# Patient Record
Sex: MALE | Race: WHITE | NOT HISPANIC OR LATINO | URBAN - METROPOLITAN AREA
[De-identification: names, ages, dates, MRNs, and addresses within clinical notes are randomized per-mention and may not be internally consistent; named-entity substitution may affect disease eponyms.]

---

## 2017-05-09 ENCOUNTER — EMERGENCY (EMERGENCY)
Facility: HOSPITAL | Age: 22
LOS: 0 days | Discharge: HOME | End: 2017-05-09
Admitting: INTERNAL MEDICINE
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
## 2017-06-28 DIAGNOSIS — S10.93XA CONTUSION OF UNSPECIFIED PART OF NECK, INITIAL ENCOUNTER: ICD-10-CM

## 2017-06-28 DIAGNOSIS — T74.11XA ADULT PHYSICAL ABUSE, CONFIRMED, INITIAL ENCOUNTER: ICD-10-CM

## 2017-06-28 DIAGNOSIS — E11.9 TYPE 2 DIABETES MELLITUS WITHOUT COMPLICATIONS: ICD-10-CM

## 2017-06-28 DIAGNOSIS — Z91.018 ALLERGY TO OTHER FOODS: ICD-10-CM
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
## 2017-06-28 DIAGNOSIS — Z91.040 LATEX ALLERGY STATUS: ICD-10-CM

## 2017-06-28 DIAGNOSIS — Z79.899 OTHER LONG TERM (CURRENT) DRUG THERAPY: ICD-10-CM

## 2018-03-28 ENCOUNTER — OUTPATIENT (OUTPATIENT)
Dept: OUTPATIENT SERVICES | Facility: HOSPITAL | Age: 23
LOS: 1 days | Discharge: HOME | End: 2018-03-28

## 2018-03-28 ENCOUNTER — APPOINTMENT (OUTPATIENT)
Dept: ORTHOPEDIC SURGERY | Facility: CLINIC | Age: 23
End: 2018-03-28

## 2018-03-28 DIAGNOSIS — M25.512 PAIN IN LEFT SHOULDER: ICD-10-CM

## 2018-03-28 DIAGNOSIS — M25.561 PAIN IN RIGHT KNEE: ICD-10-CM

## 2022-02-26 ENCOUNTER — TRANSCRIPTION ENCOUNTER (OUTPATIENT)
Age: 27
End: 2022-02-26

## 2022-05-15 ENCOUNTER — NON-APPOINTMENT (OUTPATIENT)
Age: 27
End: 2022-05-15

## 2022-05-16 ENCOUNTER — INPATIENT (INPATIENT)
Facility: HOSPITAL | Age: 27
LOS: 1 days | Discharge: HOME | End: 2022-05-18
Attending: INTERNAL MEDICINE | Admitting: INTERNAL MEDICINE
Payer: MEDICAID

## 2022-05-16 VITALS
TEMPERATURE: 98 F | WEIGHT: 139.99 LBS | DIASTOLIC BLOOD PRESSURE: 60 MMHG | RESPIRATION RATE: 22 BRPM | SYSTOLIC BLOOD PRESSURE: 109 MMHG | OXYGEN SATURATION: 98 % | HEART RATE: 121 BPM

## 2022-05-16 LAB
ALBUMIN SERPL ELPH-MCNC: 4.1 G/DL — SIGNIFICANT CHANGE UP (ref 3.5–5.2)
ALP SERPL-CCNC: 121 U/L — HIGH (ref 30–115)
ALT FLD-CCNC: 31 U/L — SIGNIFICANT CHANGE UP (ref 0–41)
ANION GAP SERPL CALC-SCNC: 16 MMOL/L — HIGH (ref 7–14)
ANION GAP SERPL CALC-SCNC: 22 MMOL/L — HIGH (ref 7–14)
APPEARANCE UR: CLEAR — SIGNIFICANT CHANGE UP
AST SERPL-CCNC: 31 U/L — SIGNIFICANT CHANGE UP (ref 0–41)
B-OH-BUTYR SERPL-SCNC: 4.8 MMOL/L — HIGH
BASE EXCESS BLDV CALC-SCNC: -5.1 MMOL/L — LOW (ref -2–3)
BASOPHILS # BLD AUTO: 0.02 K/UL — SIGNIFICANT CHANGE UP (ref 0–0.2)
BASOPHILS NFR BLD AUTO: 0.6 % — SIGNIFICANT CHANGE UP (ref 0–1)
BILIRUB SERPL-MCNC: 0.7 MG/DL — SIGNIFICANT CHANGE UP (ref 0.2–1.2)
BILIRUB UR-MCNC: NEGATIVE — SIGNIFICANT CHANGE UP
BUN SERPL-MCNC: 11 MG/DL — SIGNIFICANT CHANGE UP (ref 10–20)
BUN SERPL-MCNC: 15 MG/DL — SIGNIFICANT CHANGE UP (ref 10–20)
CA-I SERPL-SCNC: 1.17 MMOL/L — SIGNIFICANT CHANGE UP (ref 1.15–1.33)
CALCIUM SERPL-MCNC: 8.6 MG/DL — SIGNIFICANT CHANGE UP (ref 8.5–10.1)
CALCIUM SERPL-MCNC: 9.2 MG/DL — SIGNIFICANT CHANGE UP (ref 8.5–10.1)
CHLORIDE SERPL-SCNC: 91 MMOL/L — LOW (ref 98–110)
CHLORIDE SERPL-SCNC: 99 MMOL/L — SIGNIFICANT CHANGE UP (ref 98–110)
CO2 SERPL-SCNC: 18 MMOL/L — SIGNIFICANT CHANGE UP (ref 17–32)
CO2 SERPL-SCNC: 20 MMOL/L — SIGNIFICANT CHANGE UP (ref 17–32)
COLOR SPEC: COLORLESS — SIGNIFICANT CHANGE UP
CREAT SERPL-MCNC: 0.8 MG/DL — SIGNIFICANT CHANGE UP (ref 0.7–1.5)
CREAT SERPL-MCNC: 1 MG/DL — SIGNIFICANT CHANGE UP (ref 0.7–1.5)
DIFF PNL FLD: NEGATIVE — SIGNIFICANT CHANGE UP
EGFR: 106 ML/MIN/1.73M2 — SIGNIFICANT CHANGE UP
EGFR: 125 ML/MIN/1.73M2 — SIGNIFICANT CHANGE UP
EOSINOPHIL # BLD AUTO: 0.01 K/UL — SIGNIFICANT CHANGE UP (ref 0–0.7)
EOSINOPHIL NFR BLD AUTO: 0.3 % — SIGNIFICANT CHANGE UP (ref 0–8)
GAS PNL BLDV: 123 MMOL/L — LOW (ref 136–145)
GAS PNL BLDV: SIGNIFICANT CHANGE UP
GLUCOSE BLDC GLUCOMTR-MCNC: 181 MG/DL — HIGH (ref 70–99)
GLUCOSE BLDC GLUCOMTR-MCNC: 182 MG/DL — HIGH (ref 70–99)
GLUCOSE BLDC GLUCOMTR-MCNC: 188 MG/DL — HIGH (ref 70–99)
GLUCOSE BLDC GLUCOMTR-MCNC: 322 MG/DL — HIGH (ref 70–99)
GLUCOSE BLDC GLUCOMTR-MCNC: 597 MG/DL — CRITICAL HIGH (ref 70–99)
GLUCOSE SERPL-MCNC: 216 MG/DL — HIGH (ref 70–99)
GLUCOSE SERPL-MCNC: 661 MG/DL — CRITICAL HIGH (ref 70–99)
GLUCOSE UR QL: ABNORMAL
HCO3 BLDV-SCNC: 22 MMOL/L — SIGNIFICANT CHANGE UP (ref 22–29)
HCT VFR BLD CALC: 46.3 % — SIGNIFICANT CHANGE UP (ref 42–52)
HCT VFR BLDA CALC: 48 % — SIGNIFICANT CHANGE UP (ref 39–51)
HGB BLD CALC-MCNC: 16 G/DL — SIGNIFICANT CHANGE UP (ref 12.6–17.4)
HGB BLD-MCNC: 15.9 G/DL — SIGNIFICANT CHANGE UP (ref 14–18)
IMM GRANULOCYTES NFR BLD AUTO: 0.3 % — SIGNIFICANT CHANGE UP (ref 0.1–0.3)
KETONES UR-MCNC: ABNORMAL
LACTATE BLDV-MCNC: 1.7 MMOL/L — SIGNIFICANT CHANGE UP (ref 0.5–2)
LACTATE SERPL-SCNC: 1.9 MMOL/L — SIGNIFICANT CHANGE UP (ref 0.7–2)
LEUKOCYTE ESTERASE UR-ACNC: NEGATIVE — SIGNIFICANT CHANGE UP
LYMPHOCYTES # BLD AUTO: 0.58 K/UL — LOW (ref 1.2–3.4)
LYMPHOCYTES # BLD AUTO: 16.2 % — LOW (ref 20.5–51.1)
MAGNESIUM SERPL-MCNC: 1.8 MG/DL — SIGNIFICANT CHANGE UP (ref 1.8–2.4)
MCHC RBC-ENTMCNC: 29.9 PG — SIGNIFICANT CHANGE UP (ref 27–31)
MCHC RBC-ENTMCNC: 34.3 G/DL — SIGNIFICANT CHANGE UP (ref 32–37)
MCV RBC AUTO: 87 FL — SIGNIFICANT CHANGE UP (ref 80–94)
MONOCYTES # BLD AUTO: 0.39 K/UL — SIGNIFICANT CHANGE UP (ref 0.1–0.6)
MONOCYTES NFR BLD AUTO: 10.9 % — HIGH (ref 1.7–9.3)
NEUTROPHILS # BLD AUTO: 2.58 K/UL — SIGNIFICANT CHANGE UP (ref 1.4–6.5)
NEUTROPHILS NFR BLD AUTO: 71.7 % — SIGNIFICANT CHANGE UP (ref 42.2–75.2)
NITRITE UR-MCNC: NEGATIVE — SIGNIFICANT CHANGE UP
NRBC # BLD: 0 /100 WBCS — SIGNIFICANT CHANGE UP (ref 0–0)
OSMOLALITY SERPL: 312 MOS/KG — HIGH (ref 275–300)
PCO2 BLDV: 45 MMHG — SIGNIFICANT CHANGE UP (ref 42–55)
PH BLDV: 7.29 — LOW (ref 7.32–7.43)
PH UR: 6 — SIGNIFICANT CHANGE UP (ref 5–8)
PHOSPHATE SERPL-MCNC: 3.7 MG/DL — SIGNIFICANT CHANGE UP (ref 2.1–4.9)
PLATELET # BLD AUTO: 215 K/UL — SIGNIFICANT CHANGE UP (ref 130–400)
PO2 BLDV: 24 MMHG — SIGNIFICANT CHANGE UP
POTASSIUM BLDV-SCNC: 5.6 MMOL/L — HIGH (ref 3.5–5.1)
POTASSIUM SERPL-MCNC: 4.2 MMOL/L — SIGNIFICANT CHANGE UP (ref 3.5–5)
POTASSIUM SERPL-MCNC: 5.8 MMOL/L — HIGH (ref 3.5–5)
POTASSIUM SERPL-SCNC: 4.2 MMOL/L — SIGNIFICANT CHANGE UP (ref 3.5–5)
POTASSIUM SERPL-SCNC: 5.8 MMOL/L — HIGH (ref 3.5–5)
PROT SERPL-MCNC: 6.4 G/DL — SIGNIFICANT CHANGE UP (ref 6–8)
PROT UR-MCNC: NEGATIVE — SIGNIFICANT CHANGE UP
RBC # BLD: 5.32 M/UL — SIGNIFICANT CHANGE UP (ref 4.7–6.1)
RBC # FLD: 11.5 % — SIGNIFICANT CHANGE UP (ref 11.5–14.5)
SAO2 % BLDV: 43 % — SIGNIFICANT CHANGE UP
SODIUM SERPL-SCNC: 131 MMOL/L — LOW (ref 135–146)
SODIUM SERPL-SCNC: 135 MMOL/L — SIGNIFICANT CHANGE UP (ref 135–146)
SP GR SPEC: 1.03 — SIGNIFICANT CHANGE UP (ref 1.01–1.03)
UROBILINOGEN FLD QL: SIGNIFICANT CHANGE UP
WBC # BLD: 3.59 K/UL — LOW (ref 4.8–10.8)
WBC # FLD AUTO: 3.59 K/UL — LOW (ref 4.8–10.8)

## 2022-05-16 PROCEDURE — 99291 CRITICAL CARE FIRST HOUR: CPT

## 2022-05-16 PROCEDURE — 93010 ELECTROCARDIOGRAM REPORT: CPT

## 2022-05-16 RX ORDER — SODIUM CHLORIDE 9 MG/ML
1000 INJECTION, SOLUTION INTRAVENOUS ONCE
Refills: 0 | Status: COMPLETED | OUTPATIENT
Start: 2022-05-16 | End: 2022-05-16

## 2022-05-16 RX ORDER — ONDANSETRON 8 MG/1
4 TABLET, FILM COATED ORAL ONCE
Refills: 0 | Status: COMPLETED | OUTPATIENT
Start: 2022-05-16 | End: 2022-05-16

## 2022-05-16 RX ORDER — PANTOPRAZOLE SODIUM 20 MG/1
40 TABLET, DELAYED RELEASE ORAL
Refills: 0 | Status: DISCONTINUED | OUTPATIENT
Start: 2022-05-16 | End: 2022-05-18

## 2022-05-16 RX ORDER — SODIUM CHLORIDE 9 MG/ML
1000 INJECTION, SOLUTION INTRAVENOUS
Refills: 0 | Status: DISCONTINUED | OUTPATIENT
Start: 2022-05-16 | End: 2022-05-16

## 2022-05-16 RX ORDER — CHLORHEXIDINE GLUCONATE 213 G/1000ML
1 SOLUTION TOPICAL DAILY
Refills: 0 | Status: DISCONTINUED | OUTPATIENT
Start: 2022-05-16 | End: 2022-05-18

## 2022-05-16 RX ORDER — INSULIN HUMAN 100 [IU]/ML
4 INJECTION, SOLUTION SUBCUTANEOUS
Qty: 100 | Refills: 0 | Status: DISCONTINUED | OUTPATIENT
Start: 2022-05-16 | End: 2022-05-17

## 2022-05-16 RX ORDER — INSULIN HUMAN 100 [IU]/ML
10 INJECTION, SOLUTION SUBCUTANEOUS ONCE
Refills: 0 | Status: COMPLETED | OUTPATIENT
Start: 2022-05-16 | End: 2022-05-16

## 2022-05-16 RX ORDER — SODIUM CHLORIDE 9 MG/ML
2000 INJECTION, SOLUTION INTRAVENOUS ONCE
Refills: 0 | Status: COMPLETED | OUTPATIENT
Start: 2022-05-16 | End: 2022-05-16

## 2022-05-16 RX ORDER — SODIUM CHLORIDE 9 MG/ML
1000 INJECTION, SOLUTION INTRAVENOUS
Refills: 0 | Status: DISCONTINUED | OUTPATIENT
Start: 2022-05-16 | End: 2022-05-17

## 2022-05-16 RX ORDER — ENOXAPARIN SODIUM 100 MG/ML
40 INJECTION SUBCUTANEOUS EVERY 24 HOURS
Refills: 0 | Status: DISCONTINUED | OUTPATIENT
Start: 2022-05-16 | End: 2022-05-18

## 2022-05-16 RX ADMIN — SODIUM CHLORIDE 150 MILLILITER(S): 9 INJECTION, SOLUTION INTRAVENOUS at 21:04

## 2022-05-16 RX ADMIN — INSULIN HUMAN 8 UNIT(S)/HR: 100 INJECTION, SOLUTION SUBCUTANEOUS at 17:38

## 2022-05-16 RX ADMIN — SODIUM CHLORIDE 150 MILLILITER(S): 9 INJECTION, SOLUTION INTRAVENOUS at 20:07

## 2022-05-16 RX ADMIN — INSULIN HUMAN 10 UNIT(S): 100 INJECTION, SOLUTION SUBCUTANEOUS at 17:38

## 2022-05-16 RX ADMIN — SODIUM CHLORIDE 1000 MILLILITER(S): 9 INJECTION, SOLUTION INTRAVENOUS at 18:05

## 2022-05-16 RX ADMIN — SODIUM CHLORIDE 2000 MILLILITER(S): 9 INJECTION, SOLUTION INTRAVENOUS at 16:27

## 2022-05-16 RX ADMIN — ENOXAPARIN SODIUM 40 MILLIGRAM(S): 100 INJECTION SUBCUTANEOUS at 20:06

## 2022-05-16 RX ADMIN — ONDANSETRON 4 MILLIGRAM(S): 8 TABLET, FILM COATED ORAL at 18:05

## 2022-05-16 RX ADMIN — INSULIN HUMAN 4 UNIT(S)/HR: 100 INJECTION, SOLUTION SUBCUTANEOUS at 21:04

## 2022-05-16 RX ADMIN — SODIUM CHLORIDE 1000 MILLILITER(S): 9 INJECTION, SOLUTION INTRAVENOUS at 15:55

## 2022-05-16 NOTE — H&P ADULT - NSHPPHYSICALEXAM_GEN_ALL_CORE
General: NAD  Lungs: GBAE, no adventitious sounds  Heart: Tachycardic, normal s1s2, no audible murmurs  Abdomen: +BS, soft, nontender  LE: No DUANE, no ulcers or signs of Diabetic foot.

## 2022-05-16 NOTE — ED PROVIDER NOTE - ATTENDING CONTRIBUTION TO CARE
26 y.o. male, PMH of DMI on insulin, comes in c/o n/v/d and fever since friday associated with high FSs which are hard to control. No CP/SOB/abdominal pain. No urinary symptoms. No rash. On exam, pt in NAD, AAOx3, head NC/AT, CN II-XII intact, MM dry, lungs CTA B/L, CV S1S2 regular, abdomen soft/NT/ND/(+)BS, ext (-) edema, motor 5/5x4, sensation intact. Will do labs, IVF, and reevaluate.

## 2022-05-16 NOTE — H&P ADULT - ATTENDING COMMENTS
Events noted, fever, DKA, on insulin, IVF, CXR, RVP, sq insulin if tolerates PO, Endo Eval, amylase, SDU when off insulin drip

## 2022-05-16 NOTE — ED ADULT NURSE REASSESSMENT NOTE - NS ED NURSE REASSESS COMMENT FT1
Pt received from previous RN Pt in bed comfrotably OAx4 speaking in full sentences. Pt on cardiac monitor . Pt waiting for bed ICU. NAD noted at this time. Pt IN insulin drip. Call bell in reach,. Pt aware of plan.

## 2022-05-16 NOTE — ED ADULT TRIAGE NOTE - CHIEF COMPLAINT QUOTE
[place] [facility] [place] patient was sick last week with stomach virus has had elevated f/s for last 3 days

## 2022-05-16 NOTE — H&P ADULT - NSHPLABSRESULTS_GEN_ALL_CORE
WBC Count: 3.59 K/uL   RBC Count: 5.32 M/uL   Hemoglobin: 15.9 g/dL   Hematocrit: 46.3 %   Mean Cell Volume: 87.0 fL   Mean Cell Hemoglobin: 29.9 pg   Mean Cell Hemoglobin Conc: 34.3 g/dL   Red Cell Distrib Width: 11.5 %   Platelet Count - Automated: 215 K/uL   Auto Neutrophil #: 2.58 K/uL   Auto Lymphocyte #: 0.58 K/uL   Auto Monocyte #: 0.39 K/uL   Auto Eosinophil #: 0.01 K/uL   Auto Basophil #: 0.02 K/uL   Auto Neutrophil %: 71.7: Differential percentages must be correlated with absolute numbers for   clinical significance. %   Auto Lymphocyte %: 16.2 %   Auto Monocyte %: 10.9 %   Auto Eosinophil %: 0.3 %   Auto Basophil %: 0.6 %   Auto Immature Granulocyte %: 0.3: (Includes meta, myelo and promyelocytes) %   Nucleated RBC: 0 /100 WBCs     Sodium, Serum: 131 mmol/L   Potassium, Serum: 5.8 mmol/L   Chloride, Serum: 91 mmol/L   Carbon Dioxide, Serum: 18 mmol/L   Anion Gap, Serum: 22 mmol/L   Blood Urea Nitrogen, Serum: 15 mg/dL   Creatinine, Serum: 1.0 mg/dL   Glucose, Serum: 661: Critical value: CALLED TO/READ BACK BY DR HOLLIS @ 4:50PM 5/16/22 mg/dL   Calcium, Total Serum: 9.2 mg/dL   Protein Total, Serum: 6.4 g/dL   Albumin, Serum: 4.1 g/dL   Bilirubin Total, Serum: 0.7 mg/dL   Alkaline Phosphatase, Serum: 121 U/L   Aspartate Aminotransferase (AST/SGOT): 31 U/L   Alanine Aminotransferase (ALT/SGPT): 31 U/L     Beta Hydroxy-Butyrate: 4.8 mmoL/L

## 2022-05-16 NOTE — ED ADULT NURSE NOTE - NSICDXPASTMEDICALHX_GEN_ALL_CORE_FT
PAST MEDICAL HISTORY:  Hypercholesterolemia currently on Lipitor    Type 1 diabetes mellitus Diagnosed in 2005, managed by Dr. Johnston

## 2022-05-16 NOTE — ED ADULT NURSE NOTE - CHIEF COMPLAINT QUOTE
patient sent in from [facility] [place] for [place] patient was sick last week with stomach virus has had elevated f/s for last 3 days

## 2022-05-16 NOTE — ED PROVIDER NOTE - OBJECTIVE STATEMENT
Pt is a 25 y/o male with PMH of T1DM on insulin presenting with nausea, vomiting, diarrhea and fever x few days. Pt was checking his fingersticks at home and they read "high." Went to urgent care and was found to have ketones in his urine

## 2022-05-16 NOTE — H&P ADULT - HISTORY OF PRESENT ILLNESS
26-year-old, M patient with PMHx of Type I DM, presented to the ED for nausea vomiting and diarrhea. History goes back to 3 days prior to presentation when the patient started having nausea, vomiting and diarrhea. NBNB. The patient also reports decreased po intake. He reports associated increase in his sugars. Reports that he is compliant with the insulin and does not skip it. He also reports that his diet has not significantly changed ( did not significantly increase sugar/carb intake). He reports a fever 3 days prior to presentation ( 38.1C) highest. Alleviated by Tylenol. No specific pattern. Endorses greenish phlegm production, no other upper resp tract signs or symptoms. Endorses diarrhea. He denies any urinary symptoms apart from frequency, likely secondary to glucosuria.

## 2022-05-16 NOTE — H&P ADULT - ASSESSMENT
Impression:     #DKA  #HO of Type I DM    PLAN:    CNS: Avoid Sedatives    HEENT: HOB at 45 degrees.     PULMONARY: HOB, aspiration precautions.     CARDIOVASCULAR: IV fluids 0.45% NS at rate 250mL/hour ( corrected sodium >135)    GI: GI prophylaxis,  Feeding  ( patient reports that he can tolerate light meals)    RENAL: I=0, monitor electrolytes and replete as needed. BMP every shift.     INFECTIOUS DISEASE: Fevers, no WBC count. GI PCR, follow up RVP results (possible Flu vs other upper respiratory tract infection). UA is negative.     HEMATOLOGICAL:  DVT prophylaxis.     ENDOCRINE:  FS q1hrs while on insulin gtt. Insulin gtt.        Impression:     #DKA  #HO of Type I DM    PLAN:    CNS: Avoid Sedatives    HEENT: HOB at 45 degrees.     PULMONARY: HOB, aspiration precautions.     CARDIOVASCULAR: IV fluids 0.45% NS at rate 250mL/hour ( corrected sodium >135)    GI: GI prophylaxis,  Feeding  ( patient reports that he can tolerate light meals)    RENAL: I=0, monitor electrolytes and replete as needed. BMP every shift.     INFECTIOUS DISEASE: Fevers, no WBC count. GI PCR, follow up RVP results (possible Flu vs other upper respiratory tract infection). UA is negative.     HEMATOLOGICAL:  DVT prophylaxis.     ENDOCRINE:  FS q1hrs while on insulin gtt. Insulin gtt.     MICU

## 2022-05-16 NOTE — ED PROVIDER NOTE - NS ED ROS FT
Review of Systems:  CONSTITUTIONAL: No fever, No diaphoresis, No weight change  SKIN: No rash  HEMATOLOGIC: No abnormal bleeding or bruising  EYES: No eye pain, No blurred vision  ENT: No change in hearing, No sore throat, No neck pain, No rhinorrhea, No ear pain  RESPIRATORY: No shortness of breath, No cough  CARDIAC: No chest pain, No palpitations  GI: + abdominal pain, + nausea, + vomiting, + diarrhea, No constipation, No bright red blood per rectum or melena. No flank pain  : No dysuria, + frequency, no hematuria.   ENDO: No polydypsia, No polyuria, No heat/cold intolerance  MUSCULOSKELETAL: No joint paint, No swelling, No back pain  NEUROLOGIC: No numbness, No focal weakness, No headache, No dizziness  All other systems negative, unless specified in HPI

## 2022-05-17 LAB
A1C WITH ESTIMATED AVERAGE GLUCOSE RESULT: 9.1 % — HIGH (ref 4–5.6)
ANION GAP SERPL CALC-SCNC: 12 MMOL/L — SIGNIFICANT CHANGE UP (ref 7–14)
ANION GAP SERPL CALC-SCNC: 12 MMOL/L — SIGNIFICANT CHANGE UP (ref 7–14)
ANION GAP SERPL CALC-SCNC: 13 MMOL/L — SIGNIFICANT CHANGE UP (ref 7–14)
ANISOCYTOSIS BLD QL: SLIGHT — SIGNIFICANT CHANGE UP
BASOPHILS # BLD AUTO: 0 K/UL — SIGNIFICANT CHANGE UP (ref 0–0.2)
BASOPHILS NFR BLD AUTO: 0 % — SIGNIFICANT CHANGE UP (ref 0–1)
BUN SERPL-MCNC: 10 MG/DL — SIGNIFICANT CHANGE UP (ref 10–20)
BUN SERPL-MCNC: 7 MG/DL — LOW (ref 10–20)
BUN SERPL-MCNC: 7 MG/DL — LOW (ref 10–20)
CALCIUM SERPL-MCNC: 8.2 MG/DL — LOW (ref 8.5–10.1)
CALCIUM SERPL-MCNC: 8.3 MG/DL — LOW (ref 8.5–10.1)
CALCIUM SERPL-MCNC: 8.7 MG/DL — SIGNIFICANT CHANGE UP (ref 8.5–10.1)
CHLORIDE SERPL-SCNC: 104 MMOL/L — SIGNIFICANT CHANGE UP (ref 98–110)
CHLORIDE SERPL-SCNC: 104 MMOL/L — SIGNIFICANT CHANGE UP (ref 98–110)
CHLORIDE SERPL-SCNC: 98 MMOL/L — SIGNIFICANT CHANGE UP (ref 98–110)
CO2 SERPL-SCNC: 22 MMOL/L — SIGNIFICANT CHANGE UP (ref 17–32)
CO2 SERPL-SCNC: 23 MMOL/L — SIGNIFICANT CHANGE UP (ref 17–32)
CO2 SERPL-SCNC: 25 MMOL/L — SIGNIFICANT CHANGE UP (ref 17–32)
CREAT SERPL-MCNC: 0.7 MG/DL — SIGNIFICANT CHANGE UP (ref 0.7–1.5)
CREAT SERPL-MCNC: 0.7 MG/DL — SIGNIFICANT CHANGE UP (ref 0.7–1.5)
CREAT SERPL-MCNC: 0.8 MG/DL — SIGNIFICANT CHANGE UP (ref 0.7–1.5)
EGFR: 125 ML/MIN/1.73M2 — SIGNIFICANT CHANGE UP
EGFR: 130 ML/MIN/1.73M2 — SIGNIFICANT CHANGE UP
EGFR: 130 ML/MIN/1.73M2 — SIGNIFICANT CHANGE UP
EOSINOPHIL # BLD AUTO: 0.05 K/UL — SIGNIFICANT CHANGE UP (ref 0–0.7)
EOSINOPHIL NFR BLD AUTO: 1.7 % — SIGNIFICANT CHANGE UP (ref 0–8)
ESTIMATED AVERAGE GLUCOSE: 214 MG/DL — HIGH (ref 68–114)
GIANT PLATELETS BLD QL SMEAR: PRESENT — SIGNIFICANT CHANGE UP
GLUCOSE BLDC GLUCOMTR-MCNC: 118 MG/DL — HIGH (ref 70–99)
GLUCOSE BLDC GLUCOMTR-MCNC: 129 MG/DL — HIGH (ref 70–99)
GLUCOSE BLDC GLUCOMTR-MCNC: 138 MG/DL — HIGH (ref 70–99)
GLUCOSE BLDC GLUCOMTR-MCNC: 142 MG/DL — HIGH (ref 70–99)
GLUCOSE BLDC GLUCOMTR-MCNC: 143 MG/DL — HIGH (ref 70–99)
GLUCOSE BLDC GLUCOMTR-MCNC: 162 MG/DL — HIGH (ref 70–99)
GLUCOSE BLDC GLUCOMTR-MCNC: 173 MG/DL — HIGH (ref 70–99)
GLUCOSE BLDC GLUCOMTR-MCNC: 175 MG/DL — HIGH (ref 70–99)
GLUCOSE BLDC GLUCOMTR-MCNC: 195 MG/DL — HIGH (ref 70–99)
GLUCOSE BLDC GLUCOMTR-MCNC: 206 MG/DL — HIGH (ref 70–99)
GLUCOSE BLDC GLUCOMTR-MCNC: 209 MG/DL — HIGH (ref 70–99)
GLUCOSE BLDC GLUCOMTR-MCNC: 226 MG/DL — HIGH (ref 70–99)
GLUCOSE BLDC GLUCOMTR-MCNC: 240 MG/DL — HIGH (ref 70–99)
GLUCOSE BLDC GLUCOMTR-MCNC: 290 MG/DL — HIGH (ref 70–99)
GLUCOSE BLDC GLUCOMTR-MCNC: 290 MG/DL — HIGH (ref 70–99)
GLUCOSE SERPL-MCNC: 179 MG/DL — HIGH (ref 70–99)
GLUCOSE SERPL-MCNC: 190 MG/DL — HIGH (ref 70–99)
GLUCOSE SERPL-MCNC: 288 MG/DL — HIGH (ref 70–99)
HCT VFR BLD CALC: 37.5 % — LOW (ref 42–52)
HGB BLD-MCNC: 13.3 G/DL — LOW (ref 14–18)
LYMPHOCYTES # BLD AUTO: 1.56 K/UL — SIGNIFICANT CHANGE UP (ref 1.2–3.4)
LYMPHOCYTES # BLD AUTO: 55.6 % — HIGH (ref 20.5–51.1)
MACROCYTES BLD QL: SLIGHT — SIGNIFICANT CHANGE UP
MAGNESIUM SERPL-MCNC: 1.9 MG/DL — SIGNIFICANT CHANGE UP (ref 1.8–2.4)
MANUAL SMEAR VERIFICATION: SIGNIFICANT CHANGE UP
MCHC RBC-ENTMCNC: 30.2 PG — SIGNIFICANT CHANGE UP (ref 27–31)
MCHC RBC-ENTMCNC: 35.5 G/DL — SIGNIFICANT CHANGE UP (ref 32–37)
MCV RBC AUTO: 85.2 FL — SIGNIFICANT CHANGE UP (ref 80–94)
MICROCYTES BLD QL: SLIGHT — SIGNIFICANT CHANGE UP
MONOCYTES # BLD AUTO: 0.2 K/UL — SIGNIFICANT CHANGE UP (ref 0.1–0.6)
MONOCYTES NFR BLD AUTO: 7 % — SIGNIFICANT CHANGE UP (ref 1.7–9.3)
NEUTROPHILS # BLD AUTO: 1 K/UL — LOW (ref 1.4–6.5)
NEUTROPHILS NFR BLD AUTO: 32.2 % — LOW (ref 42.2–75.2)
NEUTS BAND # BLD: 3.5 % — SIGNIFICANT CHANGE UP (ref 0–6)
PLAT MORPH BLD: NORMAL — SIGNIFICANT CHANGE UP
PLATELET # BLD AUTO: 202 K/UL — SIGNIFICANT CHANGE UP (ref 130–400)
POIKILOCYTOSIS BLD QL AUTO: SLIGHT — SIGNIFICANT CHANGE UP
POTASSIUM SERPL-MCNC: 3.9 MMOL/L — SIGNIFICANT CHANGE UP (ref 3.5–5)
POTASSIUM SERPL-MCNC: 4 MMOL/L — SIGNIFICANT CHANGE UP (ref 3.5–5)
POTASSIUM SERPL-MCNC: 4.3 MMOL/L — SIGNIFICANT CHANGE UP (ref 3.5–5)
POTASSIUM SERPL-SCNC: 3.9 MMOL/L — SIGNIFICANT CHANGE UP (ref 3.5–5)
POTASSIUM SERPL-SCNC: 4 MMOL/L — SIGNIFICANT CHANGE UP (ref 3.5–5)
POTASSIUM SERPL-SCNC: 4.3 MMOL/L — SIGNIFICANT CHANGE UP (ref 3.5–5)
RAPID RVP RESULT: SIGNIFICANT CHANGE UP
RBC # BLD: 4.4 M/UL — LOW (ref 4.7–6.1)
RBC # FLD: 11.5 % — SIGNIFICANT CHANGE UP (ref 11.5–14.5)
RBC BLD AUTO: ABNORMAL
SARS-COV-2 RNA SPEC QL NAA+PROBE: SIGNIFICANT CHANGE UP
SARS-COV-2 RNA SPEC QL NAA+PROBE: SIGNIFICANT CHANGE UP
SMUDGE CELLS # BLD: PRESENT — SIGNIFICANT CHANGE UP
SODIUM SERPL-SCNC: 134 MMOL/L — LOW (ref 135–146)
SODIUM SERPL-SCNC: 138 MMOL/L — SIGNIFICANT CHANGE UP (ref 135–146)
SODIUM SERPL-SCNC: 141 MMOL/L — SIGNIFICANT CHANGE UP (ref 135–146)
STOMATOCYTES BLD QL SMEAR: SLIGHT — SIGNIFICANT CHANGE UP
WBC # BLD: 2.8 K/UL — LOW (ref 4.8–10.8)
WBC # FLD AUTO: 2.8 K/UL — LOW (ref 4.8–10.8)

## 2022-05-17 PROCEDURE — 71045 X-RAY EXAM CHEST 1 VIEW: CPT | Mod: 26

## 2022-05-17 PROCEDURE — 99222 1ST HOSP IP/OBS MODERATE 55: CPT

## 2022-05-17 RX ORDER — INSULIN GLARGINE 100 [IU]/ML
30 INJECTION, SOLUTION SUBCUTANEOUS AT BEDTIME
Refills: 0 | Status: DISCONTINUED | OUTPATIENT
Start: 2022-05-18 | End: 2022-05-18

## 2022-05-17 RX ORDER — INSULIN LISPRO 100/ML
5 VIAL (ML) SUBCUTANEOUS
Refills: 0 | Status: DISCONTINUED | OUTPATIENT
Start: 2022-05-17 | End: 2022-05-18

## 2022-05-17 RX ORDER — INSULIN LISPRO 100/ML
VIAL (ML) SUBCUTANEOUS
Refills: 0 | Status: DISCONTINUED | OUTPATIENT
Start: 2022-05-17 | End: 2022-05-18

## 2022-05-17 RX ORDER — ACETAMINOPHEN 500 MG
650 TABLET ORAL EVERY 6 HOURS
Refills: 0 | Status: DISCONTINUED | OUTPATIENT
Start: 2022-05-17 | End: 2022-05-18

## 2022-05-17 RX ORDER — INSULIN HUMAN 100 [IU]/ML
4 INJECTION, SOLUTION SUBCUTANEOUS
Qty: 100 | Refills: 0 | Status: DISCONTINUED | OUTPATIENT
Start: 2022-05-17 | End: 2022-05-17

## 2022-05-17 RX ORDER — INSULIN GLARGINE 100 [IU]/ML
17 INJECTION, SOLUTION SUBCUTANEOUS AT BEDTIME
Refills: 0 | Status: DISCONTINUED | OUTPATIENT
Start: 2022-05-17 | End: 2022-05-18

## 2022-05-17 RX ORDER — INSULIN LISPRO 100/ML
VIAL (ML) SUBCUTANEOUS AT BEDTIME
Refills: 0 | Status: DISCONTINUED | OUTPATIENT
Start: 2022-05-17 | End: 2022-05-18

## 2022-05-17 RX ORDER — ONDANSETRON 8 MG/1
4 TABLET, FILM COATED ORAL
Refills: 0 | Status: DISCONTINUED | OUTPATIENT
Start: 2022-05-17 | End: 2022-05-18

## 2022-05-17 RX ORDER — INSULIN GLARGINE 100 [IU]/ML
16 INJECTION, SOLUTION SUBCUTANEOUS ONCE
Refills: 0 | Status: COMPLETED | OUTPATIENT
Start: 2022-05-17 | End: 2022-05-17

## 2022-05-17 RX ADMIN — Medication 5 UNIT(S): at 17:56

## 2022-05-17 RX ADMIN — Medication 650 MILLIGRAM(S): at 02:16

## 2022-05-17 RX ADMIN — INSULIN GLARGINE 16 UNIT(S): 100 INJECTION, SOLUTION SUBCUTANEOUS at 08:42

## 2022-05-17 RX ADMIN — SODIUM CHLORIDE 150 MILLILITER(S): 9 INJECTION, SOLUTION INTRAVENOUS at 05:37

## 2022-05-17 RX ADMIN — Medication 5 UNIT(S): at 12:32

## 2022-05-17 RX ADMIN — SODIUM CHLORIDE 150 MILLILITER(S): 9 INJECTION, SOLUTION INTRAVENOUS at 02:15

## 2022-05-17 RX ADMIN — Medication 3: at 12:32

## 2022-05-17 RX ADMIN — ONDANSETRON 4 MILLIGRAM(S): 8 TABLET, FILM COATED ORAL at 03:55

## 2022-05-17 RX ADMIN — INSULIN HUMAN 4 UNIT(S)/HR: 100 INJECTION, SOLUTION SUBCUTANEOUS at 02:16

## 2022-05-17 RX ADMIN — INSULIN GLARGINE 17 UNIT(S): 100 INJECTION, SOLUTION SUBCUTANEOUS at 22:07

## 2022-05-17 NOTE — CHART NOTE - NSCHARTNOTEFT_GEN_A_CORE
HPI: 26-year-old, M patient with PMHx of Type I DM, presented to the ED for nausea vomiting and diarrhea. History goes back to 3 days prior to presentation when the patient started having nausea, vomiting and diarrhea. NBNB. The patient also reports decreased po intake. He reports associated increase in his sugars. Reports that he is compliant with the insulin and does not skip it. He also reports that his diet has not significantly changed ( did not significantly increase sugar/carb intake). He reports a fever 3 days prior to presentation ( 38.1C) highest. Alleviated by Tylenol. No specific pattern. Endorses greenish phlegm production, no other upper resp tract signs or symptoms. Endorses diarrhea. He denies any urinary symptoms apart from frequency, likely secondary to glucosuria. Was found to be in DKA. Admitted to MICU, started on insulin gtt, gap now closed basal lantus given @ 08:30, Insulin gtt discontinued @10:45am, pt tolerated PO. Now stable for dg to floor.      ASSESSMENT/PLAN  # DKA - resolved  # h/o T1DM  - Gap closed, tolerating PO  - A1C 9.1  - Lantus 30U, Lispro 5/5/5, FSBGL QACHS, SS ISF-53 QACHS      DISPO: Medical Floors  DVT PPX: n/a  GI PPX: n/a

## 2022-05-17 NOTE — CONSULT NOTE ADULT - ATTENDING COMMENTS
type 1 diabetes, was on omnipod insulin pump, he desires to go back on pump, and continuous glucose monitor, he can resume his usual insulin regimen when he goes home, but will arrange for pump etc., as an outpatient, he can come to clinic extn [phone] for follow up or office . in the meantime, continue current insulin regimen.

## 2022-05-17 NOTE — CONSULT NOTE ADULT - ASSESSMENT
26-year-old, M patient with PMHx of Type I DM, presented to the ED for nausea vomiting and diarrhea. History goes back to 3 days prior to presentation when the patient started having nausea, vomiting and diarrhea. Patient admitted  with a primary diagnosis  of  DKA, gap now  closed  tolerating  food on  suq insulin.       IMPRESSION  DKA  Resolved   Sepsis Likely Viral Gastroenteritis       Plan   Resume  Home  insulin regimen on discharge   Will See in OP office.

## 2022-05-17 NOTE — PROGRESS NOTE ADULT - SUBJECTIVE AND OBJECTIVE BOX
CARA HUITRON  26y, Male  Allergy: No Known Allergies    Downgrade from ICU    OVERNIGHT EVENTS:    26-year-old, M patient with PMHx of Type I DM, presented to the ED for nausea vomiting and diarrhea. History goes back to 3 days prior to presentation when the patient started having nausea, vomiting and diarrhea. NBNB. The patient also reports decreased po intake. He reports associated increase in his sugars. Reports that he is compliant with the insulin and does not skip it. He also reports that his diet has not significantly changed ( did not significantly increase sugar/carb intake). He reports a fever 3 days prior to presentation ( 38.1C) highest. Alleviated by Tylenol. No specific pattern. Endorses greenish phlegm production, no other upper resp tract signs or symptoms. Endorses diarrhea. He denies any urinary symptoms apart from frequency, likely secondary to glucosuria. Was found to be in DKA. Admitted to MICU, started on insulin gtt, gap now closed basal lantus given @ 08:30, Insulin gtt discontinued @10:45am, pt tolerated PO. Now stable for dg to floor.    Patent downgraded from unit today. Was seen and evaluated in CC rm 7. He denied any new complaint and stated that his clinical symptoms are improving. Tolerating diet but feels weak.  Denied fever, chills, abdominal pain, N/V/Diarrhea    PAST MEDICAL & SURGICAL HISTORY:  Type 1 diabetes mellitus Diagnosed in , managed by Dr. Johnston  Hypercholesterolemia  currently on Lipitor      VITALS:  T(F): 98.5 (22 @ 07:28), Max: 100.8 (22 @ 01:25)  HR: 82 (22 @ 10:40)  BP: 102/68 (22 @ 10:40) (100/60 - 113/69)  RR: 16 (22 @ 10:40)  SpO2: 98% (22 @ 10:40)    General: WN/WD NAD  Neurology: A&Ox3, nonfocal, PENA x 4  Eyes: PERRLA/ EOMI, Gross vision intact  ENT/Neck: Neck supple, trachea midline, No JVD, Gross hearing intact  Respiratory: CTA B/L, No wheezing, rales, rhonchi  CV: RRR, S1S2, no murmurs, rubs or gallops  Abdominal: Soft, NT, ND +BS,   Extremities: No edema, + peripheral pulses  Skin: No Rashes, Hematoma, Ecchymosis      TESTS & MEASUREMENTS:    Weight (kg): 63.5 (- @ 15:45)                          13.3   2.80  )-----------( 202      ( 17 May 2022 07:43 )             37.5           138  |  104  |  7<L>  ----------------------------<  190<H>  3.9   |  22  |  0.7    Ca    8.2<L>      17 May 2022 07:43  Phos  3.7       Mg     1.9         TPro  6.4  /  Alb  4.1  /  TBili  0.7  /  DBili  x   /  AST  31  /  ALT  31  /  AlkPhos  121<H>      LIVER FUNCTIONS - ( 16 May 2022 16:00 )  Alb: 4.1 g/dL / Pro: 6.4 g/dL / ALK PHOS: 121 U/L / ALT: 31 U/L / AST: 31 U/L / GGT: x                 Urinalysis Basic - ( 16 May 2022 17:15 )    Color: Colorless / Appearance: Clear / S.028 / pH: x  Gluc: x / Ketone: Large  / Bili: Negative / Urobili: <2 mg/dL   Blood: x / Protein: Negative / Nitrite: Negative   Leuk Esterase: Negative / RBC: x / WBC x   Sq Epi: x / Non Sq Epi: x / Bacteria: x        RADIOLOGY & ADDITIONAL TESTS:    MEDICATIONS:  MEDICATIONS  (STANDING):  chlorhexidine 4% Liquid 1 Application(s) Topical daily  enoxaparin Injectable 40 milliGRAM(s) SubCutaneous every 24 hours  insulin glargine Injectable (LANTUS) 17 Unit(s) SubCutaneous at bedtime  insulin lispro (ADMELOG) corrective regimen sliding scale   SubCutaneous three times a day before meals  insulin lispro (ADMELOG) corrective regimen sliding scale   SubCutaneous at bedtime  insulin lispro Injectable (ADMELOG) 5 Unit(s) SubCutaneous three times a day before meals  pantoprazole    Tablet 40 milliGRAM(s) Oral before breakfast    MEDICATIONS  (PRN):  acetaminophen     Tablet .. 650 milliGRAM(s) Oral every 6 hours PRN Temp greater or equal to 38C (100.4F), Mild Pain (1 - 3)  ondansetron Injectable 4 milliGRAM(s) IV Push two times a day PRN Nausea and/or Vomiting      HEALTH ISSUES - PROBLEM Dx:          Case discussed in details with: Patient

## 2022-05-17 NOTE — CONSULT NOTE ADULT - SUBJECTIVE AND OBJECTIVE BOX
HPI:  26-year-old, M patient with PMHx of Type I DM, presented to the ED for nausea vomiting and diarrhea. History goes back to 3 days prior to presentation when the patient started having nausea, vomiting and diarrhea. NBNB. The patient also reports decreased po intake. He reports associated increase in his sugars. Reports that he is compliant with the insulin and does not skip it. He also reports that his diet has not significantly changed ( did not significantly increase sugar/carb intake). He reports a fever 3 days prior to presentation ( 38.1C) highest. Alleviated by Tylenol. No specific pattern. Endorses greenish phlegm production, no other upper resp tract signs or symptoms. Endorses diarrhea. He denies any urinary symptoms apart from frequency, likely secondary to glucosuria. Was found to be in DKA. Admitted to MICU, started on insulin gtt, gap now closed basal lantus given @ 08:30, Insulin gtt discontinued @10:45am, pt tolerated PO.      PAST MEDICAL & SURGICAL HISTORY  Type 1 diabetes mellitus  Diagnosed in 2005, managed by Dr. Johnston    Hypercholesterolemia  currently on Lipitor        FAMILY HISTORY:  FAMILY HISTORY:      SOCIAL HISTORY:  []smoker  []Alcohol  []Drug    ALLERGIES:  No Known Allergies      MEDICATIONS:  MEDICATIONS  (STANDING):  chlorhexidine 4% Liquid 1 Application(s) Topical daily  enoxaparin Injectable 40 milliGRAM(s) SubCutaneous every 24 hours  insulin glargine Injectable (LANTUS) 17 Unit(s) SubCutaneous at bedtime  insulin lispro (ADMELOG) corrective regimen sliding scale   SubCutaneous three times a day before meals  insulin lispro (ADMELOG) corrective regimen sliding scale   SubCutaneous at bedtime  insulin lispro Injectable (ADMELOG) 5 Unit(s) SubCutaneous three times a day before meals  pantoprazole    Tablet 40 milliGRAM(s) Oral before breakfast    MEDICATIONS  (PRN):  acetaminophen     Tablet .. 650 milliGRAM(s) Oral every 6 hours PRN Temp greater or equal to 38C (100.4F), Mild Pain (1 - 3)  ondansetron Injectable 4 milliGRAM(s) IV Push two times a day PRN Nausea and/or Vomiting      HOME MEDICATIONS:  Home Medications:  Basaglar KwikPen 100 units/mL subcutaneous solution: 30 unit(s) subcutaneous once a day (at bedtime) (17 May 2022 11:47)  DULOXETINE HCL DR 20 MG CAP: TAKE 1 CAPSULE BY MOUTH EVERY MORNING AFTER MEAL (17 May 2022 11:47)      VITALS:   T(F): 98.5 (05-17 @ 07:28), Max: 100.8 (05-17 @ 01:25)  HR: 82 (05-17 @ 10:40) (68 - 121)  BP: 102/68 (05-17 @ 10:40) (100/60 - 113/69)  BP(mean): --  RR: 16 (05-17 @ 10:40) (16 - 22)  SpO2: 98% (05-17 @ 10:40) (97% - 99%)    I&O's Summary      REVIEW OF SYSTEMS:  CONSTITUTIONAL: No weakness, fevers or chills  EYES: No visual changes  ENT: No vertigo or throat pain   NECK: No pain or stiffness  RESPIRATORY: No cough, wheezing, hemoptysis; No shortness of breath  CARDIOVASCULAR: No chest pain or palpitations  GASTROINTESTINAL: No abdominal or epigastric pain. No nausea, vomiting, or hematemesis; No diarrhea or constipation. No melena or hematochezia.  GENITOURINARY: No dysuria, frequency or hematuria  NEUROLOGICAL: No numbness or weakness  SKIN: No itching, no rashes  MSK: no    PHYSICAL EXAM:  NEURO: patient is awake , alert and oriented,   GEN: Not in acute distress  NECK: no thyroid enlargement, no JVD  LUNGS: Clear to auscultation bilaterally   CARDIOVASCULAR: S1/S2 present, RRR , no murmurs or rubs, no carotid bruits,  + PP bilaterally  ABD: Soft, non-tender, non-distended, +BS  EXT: No DUANE  SKIN: Intact    LABS:                        13.3   2.80  )-----------( 202      ( 17 May 2022 07:43 )             37.5     05-17    138  |  104  |  7<L>  ----------------------------<  190<H>  3.9   |  22  |  0.7    Ca    8.2<L>      17 May 2022 07:43  Phos  3.7     05-16  Mg     1.9     05-17    TPro  6.4  /  Alb  4.1  /  TBili  0.7  /  DBili  x   /  AST  31  /  ALT  31  /  AlkPhos  121<H>  05-16      Lactate, Blood: 1.9 mmol/L (05-16-22 @ 16:00)          Troponin trend:            RADIOLOGY:  < from: Xray Chest 1 View-PORTABLE IMMEDIATE (Xray Chest 1 View-PORTABLE IMMEDIATE .) (05.17.22 @ 08:14) >    SUPPORT DEVICES: None.    CARDIAC/MEDIASTINUM/HILUM: Normal cardiac silhouette.    LUNG PARENCHYMA/PLEURA: No focal consolidation or pleural effusion. No   pneumothorax.    SKELETON/SOFT TISSUES: No acute osseous abnormality.    IMPRESSION:    No radiographic evidence of acute cardiopulmonary disease.    --- End of Report ---    < end of copied text >

## 2022-05-17 NOTE — PROGRESS NOTE ADULT - ASSESSMENT
# S/p DKA  - Endocrinologist's recommendations noted  -Resume home insulin regimen on discharge  -Patient to fu with endocrinologist OP  -Continue current insulin regimen  -Diabetic education  - Monitor vitals and labs  -Discharge planning in 24 hrs    Encourage ambulation as tolerated # S/p DKA suspect due to viral syndrome  # Type 1 DM  - Endocrinologist's recommendations noted  -Resume home insulin regimen on discharge  -Patient to fu with endocrinologist OP  -Continue current insulin regimen  -Diabetic education  - Monitor vitals and labs  -Discharge planning in 24 hrs    Encourage ambulation as tolerated

## 2022-05-18 ENCOUNTER — TRANSCRIPTION ENCOUNTER (OUTPATIENT)
Age: 27
End: 2022-05-18

## 2022-05-18 VITALS — HEART RATE: 86 BPM | RESPIRATION RATE: 18 BRPM | DIASTOLIC BLOOD PRESSURE: 75 MMHG | SYSTOLIC BLOOD PRESSURE: 117 MMHG

## 2022-05-18 LAB
A1C WITH ESTIMATED AVERAGE GLUCOSE RESULT: 9 % — HIGH (ref 4–5.6)
ANION GAP SERPL CALC-SCNC: 11 MMOL/L — SIGNIFICANT CHANGE UP (ref 7–14)
BUN SERPL-MCNC: 12 MG/DL — SIGNIFICANT CHANGE UP (ref 10–20)
CALCIUM SERPL-MCNC: 8.9 MG/DL — SIGNIFICANT CHANGE UP (ref 8.5–10.1)
CHLORIDE SERPL-SCNC: 104 MMOL/L — SIGNIFICANT CHANGE UP (ref 98–110)
CO2 SERPL-SCNC: 24 MMOL/L — SIGNIFICANT CHANGE UP (ref 17–32)
CREAT SERPL-MCNC: 0.7 MG/DL — SIGNIFICANT CHANGE UP (ref 0.7–1.5)
EGFR: 130 ML/MIN/1.73M2 — SIGNIFICANT CHANGE UP
ESTIMATED AVERAGE GLUCOSE: 212 MG/DL — HIGH (ref 68–114)
GLUCOSE BLDC GLUCOMTR-MCNC: 148 MG/DL — HIGH (ref 70–99)
GLUCOSE BLDC GLUCOMTR-MCNC: 229 MG/DL — HIGH (ref 70–99)
GLUCOSE SERPL-MCNC: 175 MG/DL — HIGH (ref 70–99)
POTASSIUM SERPL-MCNC: 4.2 MMOL/L — SIGNIFICANT CHANGE UP (ref 3.5–5)
POTASSIUM SERPL-SCNC: 4.2 MMOL/L — SIGNIFICANT CHANGE UP (ref 3.5–5)
SODIUM SERPL-SCNC: 139 MMOL/L — SIGNIFICANT CHANGE UP (ref 135–146)

## 2022-05-18 PROCEDURE — 99238 HOSP IP/OBS DSCHRG MGMT 30/<: CPT

## 2022-05-18 RX ORDER — INSULIN LISPRO 100/ML
8 VIAL (ML) SUBCUTANEOUS
Qty: 120 | Refills: 0
Start: 2022-05-18 | End: 2022-07-16

## 2022-05-18 RX ADMIN — PANTOPRAZOLE SODIUM 40 MILLIGRAM(S): 20 TABLET, DELAYED RELEASE ORAL at 06:02

## 2022-05-18 RX ADMIN — Medication 5 UNIT(S): at 11:40

## 2022-05-18 RX ADMIN — Medication 2: at 08:29

## 2022-05-18 RX ADMIN — Medication 5 UNIT(S): at 08:28

## 2022-05-18 NOTE — DISCHARGE NOTE NURSING/CASE MANAGEMENT/SOCIAL WORK - NSDCPEFALRISK_GEN_ALL_CORE
For information on Fall & Injury Prevention, visit: [email or web address] OR  [email or web address] OR  https://www.cdc.gov/steadi/patient.html

## 2022-05-18 NOTE — DISCHARGE NOTE PROVIDER - HOSPITAL COURSE
26-year-old, M patient with PMHx of Type I DM, presented to the ED for nausea vomiting and diarrhea. History goes back to 3 days prior to presentation when the patient started having nausea, vomiting and diarrhea. NBNB. The patient also reports decreased po intake. He reports associated increase in his sugars. Reports that he is compliant with the insulin and does not skip it. He also reports that his diet has not significantly changed ( did not significantly increase sugar/carb intake). He reports a fever 3 days prior to presentation ( 38.1C) highest. Alleviated by Tylenol. The patient reported diarrhea and poor PO intake prior to presentation, important to note his significant other was also symptomatic with N/V diarrhea and abdominal pain. He denied any urinary symptoms apart from frequency, likely secondary to glucosuria. Was found to be in DKA. Admitted to MICU, started on insulin gtt, gap now closed basal lantus given @ 08:30 5/17, Insulin gtt discontinued @10:45am 5/17.    The patient was seen by endocrine and they recommended re-starting patients home regimen on discharge.

## 2022-05-18 NOTE — DISCHARGE NOTE NURSING/CASE MANAGEMENT/SOCIAL WORK - PATIENT PORTAL LINK FT
You can access the FollowMyHealth Patient Portal offered by [facility] by registering at the following website: [email or web address]. By joining [facility]’s FollowMyHealth portal, you will also be able to view your health information using other applications (apps) compatible with our system.

## 2022-05-18 NOTE — DISCHARGE NOTE PROVIDER - NSDCFUSCHEDAPPT_GEN_ALL_CORE_FT
[facility]  ENDOCRIN [address]  Scheduled Appointment: 05/19/2022

## 2022-05-18 NOTE — DISCHARGE NOTE PROVIDER - CARE PROVIDER_API CALL
Poppy Neal  INTERNAL MEDICINE  [address] [place] [address]  Phone: [phone]  Fax: [phone]  Scheduled Appointment: 05/19/2022

## 2022-05-18 NOTE — DISCHARGE NOTE PROVIDER - NSDCMRMEDTOKEN_GEN_ALL_CORE_FT
Clara Mishra 100 units/mL subcutaneous solution: 30 unit(s) subcutaneous once a day (at bedtime)  DULOXETINE HCL DR 20 MG CAP: TAKE 1 CAPSULE BY MOUTH EVERY MORNING AFTER MEAL   Admelog 100 units/mL injectable solution: 8 unit(s) injectable 4 times a day as per your own home regimen using calculations based on your fingersticks and the amount of carb intake per each meal.   Basaglar KwikPen 100 units/mL subcutaneous solution: 30 unit(s) subcutaneous once a day (at bedtime)  DULOXETINE HCL DR 20 MG CAP: TAKE 1 CAPSULE BY MOUTH EVERY MORNING AFTER MEAL

## 2022-05-18 NOTE — DISCHARGE NOTE PROVIDER - NSDCCPCAREPLAN_GEN_ALL_CORE_FT
PRINCIPAL DISCHARGE DIAGNOSIS  Diagnosis: DKA (diabetic ketoacidosis)  Assessment and Plan of Treatment: You were found to have very elevated glucose which led to an acidotic state of the body requiring insulin drip and constant monitoring of the glucose. These episodes are triggered by either medication noncompliance, poor dietary compliance, or infection. Please follow up with your doctor and endocrinology for further management and monitoring.         PRINCIPAL DISCHARGE DIAGNOSIS  Diagnosis: DKA (diabetic ketoacidosis)  Assessment and Plan of Treatment: You were found to have very elevated glucose which led to an acidotic state of the body requiring insulin drip and constant monitoring of the glucose. These episodes are triggered by either medication noncompliance, poor dietary compliance, or infection. Please follow up with your doctor and endocrinology for further management and monitoring.  You were provided the option for standing insulin doses but you preferred your own home regimen of lispro based off your fingersticks and the amount of carbohydrate you eat per meal.

## 2022-05-19 ENCOUNTER — APPOINTMENT (OUTPATIENT)
Dept: ENDOCRINOLOGY | Facility: CLINIC | Age: 27
End: 2022-05-19

## 2022-05-19 ENCOUNTER — OUTPATIENT (OUTPATIENT)
Dept: OUTPATIENT SERVICES | Facility: HOSPITAL | Age: 27
LOS: 1 days | Discharge: HOME | End: 2022-05-19

## 2022-05-19 ENCOUNTER — NON-APPOINTMENT (OUTPATIENT)
Age: 27
End: 2022-05-19

## 2022-05-19 VITALS
HEIGHT: 69 IN | OXYGEN SATURATION: 98 % | HEART RATE: 83 BPM | DIASTOLIC BLOOD PRESSURE: 65 MMHG | SYSTOLIC BLOOD PRESSURE: 108 MMHG | WEIGHT: 146 LBS | BODY MASS INDEX: 21.62 KG/M2

## 2022-05-19 DIAGNOSIS — Z86.39 PERSONAL HISTORY OF OTHER ENDOCRINE, NUTRITIONAL AND METABOLIC DISEASE: ICD-10-CM

## 2022-05-19 NOTE — HISTORY OF PRESENT ILLNESS
[FreeTextEntry1] : 27 yo M with PMHx of type 1 diabetes since age 9, discharged from hospital 5/18 after admission for DKA, likely secondary to gastric viral infection. Presents to office today for follow up, patient would like to go back on insulin pump and continuos glucose monitor. type 1 diabetes, on 4 insulin injections daily, patient checks blood glucose levels, and uses those measurements to adjust insulin dosage according to sliding scale and carbohydrate counting.

## 2022-05-19 NOTE — ASSESSMENT
[Diabetes Foot Care] : diabetes foot care [Long Term Vascular Complications] : long term vascular complications of diabetes [Carbohydrate Consistent Diet] : carbohydrate consistent diet [Importance of Diet and Exercise] : importance of diet and exercise to improve glycemic control, achieve weight loss and improve cardiovascular health [Exercise/Effect on Glucose] : exercise/effect on glucose [Hypoglycemia Management] : hypoglycemia management [Retinopathy Screening] : Patient was referred to ophthalmology for retinopathy screening [FreeTextEntry1] : 27 yo M with PMHx of type 1 diabetes since age 9, discharged from hospital 5/18 after admission for DKA, likely secondary to gastric viral infection. Presents to office today for follow up, patient would like to go back on insulin pump and continuos glucose monitor. \par \par #Type 1 DM\par - Evaluation today for initiation of insulin pump and continuos glucose monitor \par - Discharged on Basaglar 30 units bedtime and Admelog 8 units QID\par - Change Basaglar to Tresiba\par - send new glucometer

## 2022-05-24 DIAGNOSIS — Z96.41 PRESENCE OF INSULIN PUMP (EXTERNAL) (INTERNAL): ICD-10-CM

## 2022-05-24 DIAGNOSIS — E10.10 TYPE 1 DIABETES MELLITUS WITH KETOACIDOSIS WITHOUT COMA: ICD-10-CM

## 2022-08-20 RX ORDER — URINE ACETONE TEST STRIPS
STRIP MISCELLANEOUS
Qty: 100 | Refills: 1 | Status: ACTIVE | COMMUNITY
Start: 2022-08-20 | End: 1900-01-01

## 2022-09-13 ENCOUNTER — NON-APPOINTMENT (OUTPATIENT)
Age: 27
End: 2022-09-13

## 2022-11-09 ENCOUNTER — APPOINTMENT (OUTPATIENT)
Dept: ENDOCRINOLOGY | Facility: CLINIC | Age: 27
End: 2022-11-09

## 2022-11-09 VITALS
HEIGHT: 69 IN | SYSTOLIC BLOOD PRESSURE: 118 MMHG | WEIGHT: 148 LBS | BODY MASS INDEX: 21.92 KG/M2 | OXYGEN SATURATION: 98 % | TEMPERATURE: 97.2 F | DIASTOLIC BLOOD PRESSURE: 72 MMHG | HEART RATE: 80 BPM

## 2022-11-09 DIAGNOSIS — Z78.9 OTHER SPECIFIED HEALTH STATUS: ICD-10-CM

## 2022-11-09 PROCEDURE — 99204 OFFICE O/P NEW MOD 45 MIN: CPT

## 2022-11-09 RX ORDER — BLOOD-GLUCOSE METER
W/DEVICE KIT MISCELLANEOUS
Qty: 1 | Refills: 0 | Status: ACTIVE | COMMUNITY
Start: 2022-05-19 | End: 1900-01-01

## 2022-11-09 NOTE — HISTORY OF PRESENT ILLNESS
[FreeTextEntry1] : Mr. CARA HUITRON  Is  a 27 year  old male  who presents to establish care for type 1 Dm \par   \par \par Diagnosis: at the age of 9 \par Current Regimen:  Tresiba 36 units at 8pm, Admelog  ( 20/20/ 10-15 with dinner )   ISF : 1:20 ,, I: C 1: 10 g \par Previous regimens: had Omnipod insulin pump and DEXCOM but stopped because of insurance ( for 2 years now ) \par Compliance: good \par SMBG/CGM :  checking 4-5 times/ day and usually 200-300, 90 pre lunch 100s ,  pre dinner  100-300, bedtime variable\par Hypoglycemia:  yes middle of the night \par Polyuria/polydipsia : no \par Weight change/BMI: same \par Diet: breakfast , lunch ( biggest meal ) , dinner \par Exercise:active all day \par HBa1c trend:  5/2022 Ac1c > 9 % in DKA \par \par .prevention  \par Statin: was on lipitor then taken off as LDL was ok \par ACE/ARB : no \par Eye examination: not recently  \par Neuropathy: yes every 6 months, in [facility] \par \par   [Finger Stick] : Finger Stick: Yes

## 2022-11-09 NOTE — REVIEW OF SYSTEMS
[Fatigue] : fatigue [Recent Weight Gain (___ Lbs)] : no recent weight gain [Recent Weight Loss (___ Lbs)] : no recent weight loss [Visual Field Defect] : no visual field defect [Blurred Vision] : blurred vision [Dysphagia] : no dysphagia [Neck Pain] : no neck pain [Dysphonia] : no dysphonia [Chest Pain] : no chest pain [Palpitations] : no palpitations [Lower Ext Edema] : no lower extremity edema [Shortness Of Breath] : no shortness of breath [SOB on Exertion] : no shortness of breath on exertion [Nausea] : no nausea [Constipation] : no constipation [Vomiting] : no vomiting [Diarrhea] : no diarrhea [As Noted in HPI] : as noted in HPI [Headaches] : no headaches [Dizziness] : no dizziness [Tremors] : no tremors [Pain/Numbness of Digits] : pain/numbness of digits [Cold Intolerance] : no cold intolerance [Heat Intolerance] : no heat intolerance

## 2022-11-09 NOTE — ASSESSMENT
[Diabetes Foot Care] : diabetes foot care [Long Term Vascular Complications] : long term vascular complications of diabetes [Carbohydrate Consistent Diet] : carbohydrate consistent diet [Importance of Diet and Exercise] : importance of diet and exercise to improve glycemic control, achieve weight loss and improve cardiovascular health [Exercise/Effect on Glucose] : exercise/effect on glucose [Hypoglycemia Management] : hypoglycemia management [Self Monitoring of Blood Glucose] : self monitoring of blood glucose [Insulin Self-Administration] : insulin self-administration [Retinopathy Screening] : Patient was referred to ophthalmology for retinopathy screening [Weight Loss] : weight loss [FreeTextEntry1] : Mr. CARA UHITRON  Is  a 27 year  old male  who presents to establish care for type 1 Dm \par   \par \par # poorly controlled type 1 DM , hypoglycemia \par Current Regimen:  Tresiba 36 units at 8pm, Admelog  ( 20/20/ 10-15 with dinner )   ISF : 1:20 ,, I: C 1: 10 g \par Previous regimens: had Omnipod insulin pump and DEXCOM but stopped because of insurance ( for 2 years now ) \par stopped because of insurance \par SMBG/CGM :  checking 4-5 times/ day and usually 200-300, 90 pre lunch 100s ,  pre dinner  100-300, bedtime variable, no real pattern very erratic between hypo and  hyperglycemia  , will benefit from having CGM again but he will notify me once insurance ok \par - increase tresiba to 28 units daily at bedtime \par - continue admelog 1:10 g of carbs with breakfast and lunch and make it 1: 9 g for dinner to avoid bedtime high and need for correction at bedtime ( as usually ending with low at night ) \par - continue ISF 1: 20 \par - need opthalmology referral \par - uptodate with podiatry \par BP ok \par - need labs and follow up in 3 months \par  will send for baqsimi discussed use \par

## 2022-11-09 NOTE — PHYSICAL EXAM
[Alert] : alert [No Acute Distress] : no acute distress [No Proptosis] : no proptosis [No Lid Lag] : no lid lag [Thyroid Not Enlarged] : the thyroid was not enlarged [No Thyroid Nodules] : no palpable thyroid nodules [No Respiratory Distress] : no respiratory distress [No Murmurs] : no murmurs [Regular Rhythm] : with a regular rhythm [No Edema] : no peripheral edema [Not Tender] : non-tender [Soft] : abdomen soft [No Stigmata of Cushings Syndrome] : no stigmata of Cushings Syndrome [Abdominal Striae] : no abdominal striae [Acanthosis Nigricans] : no acanthosis nigricans [No Tremors] : no tremors [Oriented x3] : oriented to person, place, and time [de-identified] : wheezing  [de-identified] : area of skin hypertrophy from insulin injections

## 2022-12-16 ENCOUNTER — APPOINTMENT (OUTPATIENT)
Dept: GASTROENTEROLOGY | Facility: CLINIC | Age: 27
End: 2022-12-16

## 2023-02-27 ENCOUNTER — INPATIENT (INPATIENT)
Facility: HOSPITAL | Age: 28
LOS: 1 days | Discharge: HOME | DRG: 248 | End: 2023-03-01
Attending: INTERNAL MEDICINE | Admitting: INTERNAL MEDICINE
Payer: MEDICAID

## 2023-02-27 VITALS
WEIGHT: 139.99 LBS | SYSTOLIC BLOOD PRESSURE: 118 MMHG | HEART RATE: 76 BPM | HEIGHT: 69 IN | DIASTOLIC BLOOD PRESSURE: 76 MMHG | TEMPERATURE: 98 F | RESPIRATION RATE: 18 BRPM

## 2023-02-27 DIAGNOSIS — E08.10 DIABETES MELLITUS DUE TO UNDERLYING CONDITION WITH KETOACIDOSIS WITHOUT COMA: ICD-10-CM

## 2023-02-27 LAB
ALBUMIN SERPL ELPH-MCNC: 4.1 G/DL — SIGNIFICANT CHANGE UP (ref 3.5–5.2)
ALP SERPL-CCNC: 114 U/L — SIGNIFICANT CHANGE UP (ref 30–115)
ALT FLD-CCNC: 27 U/L — SIGNIFICANT CHANGE UP (ref 0–41)
ANION GAP SERPL CALC-SCNC: 16 MMOL/L — HIGH (ref 7–14)
APPEARANCE UR: CLEAR — SIGNIFICANT CHANGE UP
AST SERPL-CCNC: 22 U/L — SIGNIFICANT CHANGE UP (ref 0–41)
B-OH-BUTYR SERPL-SCNC: 2.3 MMOL/L — HIGH
BASE EXCESS BLDV CALC-SCNC: -4.7 MMOL/L — LOW (ref -2–3)
BASOPHILS # BLD AUTO: 0.02 K/UL — SIGNIFICANT CHANGE UP (ref 0–0.2)
BASOPHILS NFR BLD AUTO: 0.4 % — SIGNIFICANT CHANGE UP (ref 0–1)
BILIRUB DIRECT SERPL-MCNC: 0.2 MG/DL — SIGNIFICANT CHANGE UP (ref 0–0.3)
BILIRUB INDIRECT FLD-MCNC: 0.6 MG/DL — SIGNIFICANT CHANGE UP (ref 0.2–1.2)
BILIRUB SERPL-MCNC: 0.8 MG/DL — SIGNIFICANT CHANGE UP (ref 0.2–1.2)
BILIRUB UR-MCNC: NEGATIVE — SIGNIFICANT CHANGE UP
BUN SERPL-MCNC: 9 MG/DL — LOW (ref 10–20)
C DIFF BY PCR RESULT: DETECTED
CA-I SERPL-SCNC: 1.23 MMOL/L — SIGNIFICANT CHANGE UP (ref 1.15–1.33)
CALCIUM SERPL-MCNC: 8.6 MG/DL — SIGNIFICANT CHANGE UP (ref 8.4–10.5)
CHLORIDE SERPL-SCNC: 98 MMOL/L — SIGNIFICANT CHANGE UP (ref 98–110)
CO2 SERPL-SCNC: 18 MMOL/L — SIGNIFICANT CHANGE UP (ref 17–32)
COLOR SPEC: SIGNIFICANT CHANGE UP
CREAT SERPL-MCNC: 0.8 MG/DL — SIGNIFICANT CHANGE UP (ref 0.7–1.5)
DIFF PNL FLD: NEGATIVE — SIGNIFICANT CHANGE UP
EGFR: 124 ML/MIN/1.73M2 — SIGNIFICANT CHANGE UP
EOSINOPHIL # BLD AUTO: 0.02 K/UL — SIGNIFICANT CHANGE UP (ref 0–0.7)
EOSINOPHIL NFR BLD AUTO: 0.4 % — SIGNIFICANT CHANGE UP (ref 0–8)
GAS PNL BLDV: 127 MMOL/L — LOW (ref 136–145)
GAS PNL BLDV: SIGNIFICANT CHANGE UP
GLUCOSE BLDC GLUCOMTR-MCNC: 401 MG/DL — HIGH (ref 70–99)
GLUCOSE SERPL-MCNC: 386 MG/DL — HIGH (ref 70–99)
GLUCOSE UR QL: ABNORMAL
HCO3 BLDV-SCNC: 22 MMOL/L — SIGNIFICANT CHANGE UP (ref 22–29)
HCT VFR BLD CALC: 44.9 % — SIGNIFICANT CHANGE UP (ref 42–52)
HCT VFR BLDA CALC: 47 % — SIGNIFICANT CHANGE UP (ref 39–51)
HGB BLD CALC-MCNC: 15.5 G/DL — SIGNIFICANT CHANGE UP (ref 12.6–17.4)
HGB BLD-MCNC: 15.6 G/DL — SIGNIFICANT CHANGE UP (ref 14–18)
IMM GRANULOCYTES NFR BLD AUTO: 0.2 % — SIGNIFICANT CHANGE UP (ref 0.1–0.3)
KETONES UR-MCNC: ABNORMAL
LACTATE BLDV-MCNC: 1.1 MMOL/L — SIGNIFICANT CHANGE UP (ref 0.5–2)
LEUKOCYTE ESTERASE UR-ACNC: NEGATIVE — SIGNIFICANT CHANGE UP
LIDOCAIN IGE QN: 14 U/L — SIGNIFICANT CHANGE UP (ref 7–60)
LYMPHOCYTES # BLD AUTO: 0.51 K/UL — LOW (ref 1.2–3.4)
LYMPHOCYTES # BLD AUTO: 11.1 % — LOW (ref 20.5–51.1)
MCHC RBC-ENTMCNC: 30.4 PG — SIGNIFICANT CHANGE UP (ref 27–31)
MCHC RBC-ENTMCNC: 34.7 G/DL — SIGNIFICANT CHANGE UP (ref 32–37)
MCV RBC AUTO: 87.4 FL — SIGNIFICANT CHANGE UP (ref 80–94)
MONOCYTES # BLD AUTO: 0.35 K/UL — SIGNIFICANT CHANGE UP (ref 0.1–0.6)
MONOCYTES NFR BLD AUTO: 7.6 % — SIGNIFICANT CHANGE UP (ref 1.7–9.3)
NEUTROPHILS # BLD AUTO: 3.7 K/UL — SIGNIFICANT CHANGE UP (ref 1.4–6.5)
NEUTROPHILS NFR BLD AUTO: 80.3 % — HIGH (ref 42.2–75.2)
NITRITE UR-MCNC: NEGATIVE — SIGNIFICANT CHANGE UP
NRBC # BLD: 0 /100 WBCS — SIGNIFICANT CHANGE UP (ref 0–0)
PCO2 BLDV: 46 MMHG — SIGNIFICANT CHANGE UP (ref 42–55)
PH BLDV: 7.29 — LOW (ref 7.32–7.43)
PH UR: 5.5 — SIGNIFICANT CHANGE UP (ref 5–8)
PLATELET # BLD AUTO: 272 K/UL — SIGNIFICANT CHANGE UP (ref 130–400)
PO2 BLDV: 26 MMHG — SIGNIFICANT CHANGE UP
POTASSIUM BLDV-SCNC: 4.8 MMOL/L — SIGNIFICANT CHANGE UP (ref 3.5–5.1)
POTASSIUM SERPL-MCNC: 4.7 MMOL/L — SIGNIFICANT CHANGE UP (ref 3.5–5)
POTASSIUM SERPL-SCNC: 4.7 MMOL/L — SIGNIFICANT CHANGE UP (ref 3.5–5)
PROT SERPL-MCNC: 6.5 G/DL — SIGNIFICANT CHANGE UP (ref 6–8)
PROT UR-MCNC: NEGATIVE — SIGNIFICANT CHANGE UP
RBC # BLD: 5.14 M/UL — SIGNIFICANT CHANGE UP (ref 4.7–6.1)
RBC # FLD: 11.3 % — LOW (ref 11.5–14.5)
SAO2 % BLDV: 44.2 % — SIGNIFICANT CHANGE UP
SARS-COV-2 RNA SPEC QL NAA+PROBE: SIGNIFICANT CHANGE UP
SODIUM SERPL-SCNC: 132 MMOL/L — LOW (ref 135–146)
SP GR SPEC: 1.03 — HIGH (ref 1.01–1.03)
UROBILINOGEN FLD QL: SIGNIFICANT CHANGE UP
WBC # BLD: 4.61 K/UL — LOW (ref 4.8–10.8)
WBC # FLD AUTO: 4.61 K/UL — LOW (ref 4.8–10.8)

## 2023-02-27 PROCEDURE — 71045 X-RAY EXAM CHEST 1 VIEW: CPT | Mod: 26

## 2023-02-27 PROCEDURE — 83036 HEMOGLOBIN GLYCOSYLATED A1C: CPT

## 2023-02-27 PROCEDURE — 80053 COMPREHEN METABOLIC PANEL: CPT

## 2023-02-27 PROCEDURE — 85025 COMPLETE CBC W/AUTO DIFF WBC: CPT

## 2023-02-27 PROCEDURE — 36415 COLL VENOUS BLD VENIPUNCTURE: CPT

## 2023-02-27 PROCEDURE — 80048 BASIC METABOLIC PNL TOTAL CA: CPT

## 2023-02-27 PROCEDURE — 82962 GLUCOSE BLOOD TEST: CPT

## 2023-02-27 PROCEDURE — 84100 ASSAY OF PHOSPHORUS: CPT

## 2023-02-27 PROCEDURE — 99291 CRITICAL CARE FIRST HOUR: CPT

## 2023-02-27 PROCEDURE — 83735 ASSAY OF MAGNESIUM: CPT

## 2023-02-27 RX ORDER — ONDANSETRON 8 MG/1
4 TABLET, FILM COATED ORAL ONCE
Refills: 0 | Status: COMPLETED | OUTPATIENT
Start: 2023-02-27 | End: 2023-02-27

## 2023-02-27 RX ORDER — SODIUM CHLORIDE 9 MG/ML
1000 INJECTION INTRAMUSCULAR; INTRAVENOUS; SUBCUTANEOUS ONCE
Refills: 0 | Status: COMPLETED | OUTPATIENT
Start: 2023-02-27 | End: 2023-02-27

## 2023-02-27 RX ORDER — INSULIN HUMAN 100 [IU]/ML
3 INJECTION, SOLUTION SUBCUTANEOUS
Qty: 100 | Refills: 0 | Status: DISCONTINUED | OUTPATIENT
Start: 2023-02-27 | End: 2023-02-28

## 2023-02-27 RX ORDER — DEXTROSE MONOHYDRATE, SODIUM CHLORIDE, AND POTASSIUM CHLORIDE 50; .745; 4.5 G/1000ML; G/1000ML; G/1000ML
1000 INJECTION, SOLUTION INTRAVENOUS
Refills: 0 | Status: DISCONTINUED | OUTPATIENT
Start: 2023-02-27 | End: 2023-02-28

## 2023-02-27 RX ORDER — FAMOTIDINE 10 MG/ML
20 INJECTION INTRAVENOUS ONCE
Refills: 0 | Status: COMPLETED | OUTPATIENT
Start: 2023-02-27 | End: 2023-02-27

## 2023-02-27 RX ADMIN — INSULIN HUMAN 6 UNIT(S)/HR: 100 INJECTION, SOLUTION SUBCUTANEOUS at 22:00

## 2023-02-27 RX ADMIN — ONDANSETRON 4 MILLIGRAM(S): 8 TABLET, FILM COATED ORAL at 20:19

## 2023-02-27 RX ADMIN — FAMOTIDINE 20 MILLIGRAM(S): 10 INJECTION INTRAVENOUS at 20:19

## 2023-02-27 RX ADMIN — SODIUM CHLORIDE 1000 MILLILITER(S): 9 INJECTION INTRAMUSCULAR; INTRAVENOUS; SUBCUTANEOUS at 22:00

## 2023-02-27 RX ADMIN — DEXTROSE MONOHYDRATE, SODIUM CHLORIDE, AND POTASSIUM CHLORIDE 200 MILLILITER(S): 50; .745; 4.5 INJECTION, SOLUTION INTRAVENOUS at 23:51

## 2023-02-27 RX ADMIN — SODIUM CHLORIDE 1000 MILLILITER(S): 9 INJECTION INTRAMUSCULAR; INTRAVENOUS; SUBCUTANEOUS at 20:20

## 2023-02-27 NOTE — H&P ADULT - NSHPLABSRESULTS_GEN_ALL_CORE
A1C with Estimated Average Glucose (05.18.22 @ 07:05)    A1C with Estimated Average Glucose Result: 9.0 %    Estimated Average Glucose: 212 mg/dL

## 2023-02-27 NOTE — CHART NOTE - NSCHARTNOTEFT_GEN_A_CORE
IMPRESSION:    Mild DKA  HO of Type I DM    PLAN:    CNS: Avoid Sedatives    HEENT: HOB at 45 degrees.     PULMONARY: HOB, aspiration precautions. on room air    CARDIOVASCULAR:  0.45% NS with 20 mEq  @250cc/hr for now, change to dextrose when glucose reaches 200 mg/dL    GI: GI prophylaxis,  Feeding in AM--patient still nausea, anti-emetics    RENAL: I=0, monitor electrolytes and replete as needed. BMP every shift.     INFECTIOUS DISEASE: monitor off antibiotics     HEMATOLOGICAL:  DVT prophylaxis.     ENDOCRINE:  FS q1hrs while on insulin gtt. Insulin gtt. Endocrine evaluation     MICU monitoring for now IMPRESSION:    Mild DKA  HO of Type I DM    PLAN:    CNS: Avoid Sedatives    HEENT: HOB at 45 degrees.     PULMONARY: HOB, aspiration precautions. on room air    CARDIOVASCULAR:  0.45% NS with 20 mEq  @250cc/hr for now, change to dextrose when glucose reaches 200 mg/dL    GI: GI prophylaxis,  Feeding in AM--patient still nauseous, anti-emetics, lipase negative    RENAL: I=0, monitor electrolytes and replete as needed. BMP every shift.     INFECTIOUS DISEASE: monitor off antibiotics     HEMATOLOGICAL:  DVT prophylaxis.     ENDOCRINE:  FS q1hrs while on insulin gtt. Insulin gtt. Endocrine evaluation     MICU monitoring for now

## 2023-02-27 NOTE — H&P ADULT - NSHPPHYSICALEXAM_GEN_ALL_CORE
GEN: NAD  HEENT: NCAT, PERRL, EOMI, No JVD/TD  CV/CHEST: RRR, no murmurs appreciated  PULM: CTAB, Good Air Entry Bilaterally  ABD: SNTTP, ND x 4 Q's  EXT: Warm, Well Perfused x 4. No Edema  SKIN: No Rash  NEURO: AxOx3, + Normal Affect

## 2023-02-27 NOTE — H&P ADULT - HISTORY OF PRESENT ILLNESS
HPI: 27M Type 1DM on insulin normally well controlled, has been having viral gastroenteritis-like sxs for last 16 hours, not eating much, watery diarrhea, nausea, vomiting.     ED COURSE: Noted to have mild DKA,  HPI: 27M 26-year-old, M patient with PMHx of Type I DM, presented to the ED for nausea vomiting and diarrhea. History goes back to 3 days prior to presentation when the patient started having nausea, vomiting and diarrhea. NBNB. The patient also reports decreased po intake. Reports that he is compliant with his insulin regimen.     ED COURSE: Noted to have mild DKA, Given 2L NS, zofran and pepcid. Admitted to ICU.     ICU Vital Signs Last 24 Hrs  T(C): 36.4 (27 Feb 2023 18:56), Max: 36.4 (27 Feb 2023 18:56)  T(F): 97.5 (27 Feb 2023 18:56), Max: 97.5 (27 Feb 2023 18:56)  HR: 76 (27 Feb 2023 18:56) (76 - 76)  BP: 118/76 (27 Feb 2023 18:56) (118/76 - 118/76)  BP(mean): --  ABP: --  ABP(mean): --  RR: 18 (27 Feb 2023 18:56) (18 - 18)  SpO2: --

## 2023-02-27 NOTE — ED PROVIDER NOTE - CLINICAL SUMMARY MEDICAL DECISION MAKING FREE TEXT BOX
27-year-old male with history of type 1 diabetes presents to the ED for nausea vomiting diarrhea x 3 days. no fevers. no abd pain. possible recent abx use. had + ketones at home, prompting ED visit. Nontender abdomen. Vitals with mild tachycardia. CBC normal however cmp with elevated anion gap. hyperglycemia over 300, low bicarb, and acidosis on vbg. elevated beta hydroxy bytyrate. Concern for DKA. IV fluids given, insulin drip ordered. abdomen nontender, no indication for imaging at this time.  ICU consulted. admitted for ICU for further management.

## 2023-02-27 NOTE — ED PROVIDER NOTE - PRO INTERPRETER NEED 2
Assessment/Plan:     Problem List Items Addressed This Visit        Respiratory    Centrilobular emphysema ([address] )     Patient has history of centrilobular emphysema  He smoked most of his adult life  His last PFT was done as April 2019  Forced vital capacity was 3 29 L or 78% of predicted, FEV1 was 1 95 L or 62% and obstruction ratio 59% moderate airway obstruction was noted  On flow volume loop  Patient currently is using Anoro 1 puff daily  This is an inhaled anticholinergic and long-acting beta agonist   He rarely needs rescue inhalation and currently is stable  Patient did receive influenza vaccine today  Relevant Medications    ANORO ELLIPTA 62 5-25 MCG/INH inhaler    Obstructive sleep apnea     Patient has a history of mild-to-moderate obstructive sleep apnea  He had his diagnostic test done June 28, 2018  He had mild oxygen desaturation  Apneic hypopnea index was 5 5 and increased to 14 9 during REM sleep  Supine position AHI increased 24 5  Compliance data reveals as follows: For last 30 days he used the machine 28 days or 94% and average usage was 5 hours 27 minutes usage over 4 hours was 74% in AHI was reduced to 2 5  Events per hour  Patient does not want his CPAP  He has been using Tylenol p m  He said this is helpful in his sleep  I did review the advantages of continuation but he defers at this point he would like it discontinued  Relevant Orders    Discontinue CPAP       Other    Former smoker     Patient is a former smoker  He quit smoking 2 years ago  He smoked over a pack of cigarettes per day  He currently is vaping and I did review the health hazards of a being  He is well aware health disadvantages    I did discuss low radiation screening CT of chest   He defers at this time of will consider it in the future           Other Visit Diagnoses     Need for diphtheria, tetanus, acellular pertussis and haemophilus influenzae vaccine    -  Primary    Relevant Orders Group Topic: BH Skills Training     Date: 3/20/2020  Start Time: 1630  End Time: 1705  Facilitators: JEANNA Maldonado    Focus: Stress Management  Number in attendance: 8    Method: Group  Attendance: Present  Participation: Active  Patient Response: Appropriate feedback, Attentive, Good eye contact and Interested in topic  Mood: optimistic  Affect: Calm, Happy, Positive and Relaxed  Behavior/Socialization: Appropriate to group, Cooperative and Engaged  Thought Process: Focused  Task Performance: Follows directions   influenza vaccine, [id number], quadrivalent, recombinant, PF, 0 5 mL, for patients 18 yr+ (FLUBLOK)            Return in about 8 months (around 7/19/2020)  All questions are answered to the patient's satisfaction and understanding  He verbalizes understanding  He is encouraged to call with any further questions or concerns  Portions of the record may have been created with voice recognition software  Occasional wrong word or "sound a like" substitutions may have occurred due to the inherent limitations of voice recognition software  Read the chart carefully and recognize, using context, where substitutions have occurred  HPI:  Didi Pedersen is a 27-year-old male who has history of obstructive sleep apnea  He also has centrilobular emphysema  Initial diagnostic test was done in June 2019  Overall apneic hypopnea index was 5 5 events per hour  It was noted that during REM sleep a AHI increased to 14 9 events per hour  Mean oxygen saturation was 92 and amanuel was 84%  He spent 8 minutes less than equal to 88% O2 saturation  Patient also has history of centrilobular emphysema and last pulmonary function test was done in April of 2019  Moderate airway obstruction was noted  Electronically Signed by CHRIS Rodriguez    ______________________________________________________________________    Chief Complaint:   Chief Complaint   Patient presents with    Follow-up    Shortness of Breath     stairs       Patient ID: Mihai Perez is a 59 y o  y o  male has a past medical history of Arthritis, Centrilobular emphysema ([address] ), COPD (chronic obstructive pulmonary disease) ([address] ), BOB (dyspnea on exertion), and URI (upper respiratory infection)  11/19/2019  Patient presents today for follow-up visit  Shortness of Breath   This is a chronic problem  The current episode started more than 1 year ago  The problem occurs daily  The problem has been unchanged  The symptoms are aggravated by exercise   The patient has no known risk factors for DVT/PE  He has tried beta agonist inhalers, rest and steroid inhalers for the symptoms  The treatment provided mild relief  His past medical history is significant for COPD  Review of Systems   Constitutional: Negative  HENT: Negative  Eyes: Negative  Respiratory: Positive for shortness of breath  Cardiovascular: Negative  Genitourinary: Negative  Musculoskeletal: Negative  Skin: Negative  Allergic/Immunologic: Negative  Hematological: Negative  Psychiatric/Behavioral: Negative  Smoking history: He reports that he quit smoking about 23 months ago  He started smoking about 46 years ago  He has a 43 00 pack-year smoking history  He has never used smokeless tobacco     The following portions of the patient's history were reviewed and updated as appropriate: current medications, past family history, past medical history, past social history, past surgical history and problem list     There is no immunization history for the selected administration types on file for this patient  Current Outpatient Medications   Medication Sig Dispense Refill    albuterol (PROAIR HFA) 90 mcg/act inhaler Inhale 2 puffs every 4 (four) hours as needed for wheezing or shortness of breath 1 Inhaler 6    amlodipine-olmesartan (ARNOLD) 5-40 MG   3    ANORO ELLIPTA 62 5-25 MCG/INH inhaler Inhale 1 puff daily 1 Inhaler 5    Multiple Vitamins-Minerals (CENTRUM ADULTS PO) Take 1 tablet by mouth daily      amLODIPine (NORVASC) 5 mg tablet   3    fluticasone-umeclidinium-vilanterol (TRELEGY ELLIPTA) 100-62 5-25 MCG/INH inhaler Inhale 1 puff daily Rinse mouth after use  (Patient not taking: Reported on 11/19/2019) 1 Inhaler 6    nicotine (NICOTROL) 10 MG inhaler Use 1 cartridge every hour as needed do not exceed 10 day (Patient not taking: Reported on 7/15/2019) 168 each 3     No current facility-administered medications for this visit        Allergies: Patient has no known allergies  Objective:  Vitals:    11/19/19 1025   BP: 138/70   BP Location: Left arm   Patient Position: Sitting   Cuff Size: Standard   Pulse: 78   Resp: 16   Temp: 98 7 °F (37 1 °C)   TempSrc: Tympanic   SpO2: 95%   Weight: 88 kg (194 lb)   Height: 5' 7" (1 702 m)   Oxygen Therapy  SpO2: 95 %    Wt Readings from Last 3 Encounters:   11/19/19 88 kg (194 lb)   07/15/19 85 7 kg (189 lb)   06/04/19 88 kg (194 lb)     Body mass index is 30 38 kg/m²  Physical Exam   Constitutional: He is oriented to person, place, and time  He appears well-developed and well-nourished  BMI 30   HENT:   Head: Normocephalic and atraumatic  Mallampati 4   Eyes: EOM are normal    Neck: Normal range of motion  Neck supple  Cardiovascular: Normal rate and regular rhythm  Pulmonary/Chest: Effort normal and breath sounds normal    Abdominal: Soft  Musculoskeletal: Normal range of motion  Right lower leg: Normal         Left lower leg: Normal    Neurological: He is alert and oriented to person, place, and time  Skin: Skin is warm  Capillary refill takes less than 2 seconds  Psychiatric: He has a normal mood and affect  His behavior is normal        Lab Review:   No visits with results within 6 Month(s) from this visit  Latest known visit with results is:   Hospital Outpatient Visit on 03/13/2019   Component Date Value    Protocol Name 03/13/2019 Sonny Coles Time In Exercise Phase 03/13/2019 00:05:13     MAX  SYSTOLIC BP 05/21/0572 366     Max Diastolic Bp 29/05/6207 240     Max Heart Rate 03/13/2019 122     Max Predicted Heart Rate 03/13/2019 157     Reason for Termination 03/13/2019                      Value:Fatigue  Dyspnea,protocol changed to Gertrude      Test Indication 03/13/2019 Dyspnea     Target Hr Formular 03/13/2019 (220 - Age)*100%     Chest Pain Statement 03/13/2019 none        Diagnostics:  I have personally reviewed pertinent reports        Office Spirometry Results:     ESS:    No results found  English

## 2023-02-27 NOTE — ED PROVIDER NOTE - CARE PLAN
Principal Discharge DX:	DKA (diabetic ketoacidosis)  Secondary Diagnosis:	Nausea vomiting and diarrhea   1

## 2023-02-27 NOTE — ED PROVIDER NOTE - NS ED ROS FT
Constitutional: (-) fever  Eyes/ENT: (-) blurry vision, (-) epistaxis  Cardiovascular: (-) chest pain, (-) syncope  Respiratory: (-) cough, (-) shortness of breath  Gastrointestinal: (+)nausea, (+) vomiting, (+) diarrhea  Musculoskeletal: (-) neck pain, (-) back pain, (-) joint pain  Integumentary: (-) rash, (-) edema  Neurological: (-) headache, (-) altered mental status  Psychiatric: (-) hallucinations  Allergic/Immunologic: (-) pruritus

## 2023-02-27 NOTE — ED ADULT NURSE NOTE - OBJECTIVE STATEMENT
Patient Aox3, ambulatory, c/o abdominal pain, n/v, and reported he think he might be in DKA because he recently had a virus that cause high ketone in the blood.

## 2023-02-27 NOTE — H&P ADULT - ASSESSMENT
IMPRESSION:  # Mild DKA  # HO of Type I DM    PLAN:    CNS: Avoid Sedatives    HEENT: HOB at 45 degrees.     PULMONARY: HOB, aspiration precautions. on room air    CARDIOVASCULAR:  0.45% NS with 20 mEq  @250cc/hr for now, change to dextrose when glucose reaches 200 mg/dL    GI: GI prophylaxis,  Feeding in AM--patient still nauseous, anti-emetics, lipase negative    RENAL: I=0, monitor electrolytes and replete as needed. BMP every shift.     INFECTIOUS DISEASE: monitor off antibiotics     HEMATOLOGICAL:  DVT prophylaxis.     ENDOCRINE:  FS q1hrs while on insulin gtt. Insulin gtt.    MICU monitoring for now.   IMPRESSION:  # Mild DKA  # C. Diff Infection  # HO of Type I DM    PLAN:    CNS: Avoid Sedatives    HEENT: HOB at 45 degrees.     PULMONARY: HOB, aspiration precautions. on room air    CARDIOVASCULAR:  0.45% NS with 20 mEq  @250cc/hr for now, change to dextrose when glucose reaches 200 mg/dL    GI: GI prophylaxis,  Feeding in AM--patient still nauseous, anti-emetics, lipase negative    RENAL: I=0, monitor electrolytes and replete as needed. BMP every shift.     INFECTIOUS DISEASE: PO Vanc, Contact Precautions    HEMATOLOGICAL:  DVT prophylaxis.     ENDOCRINE:  FS q1hrs while on insulin gtt. Insulin gtt.    MICU monitoring for now.

## 2023-02-27 NOTE — ED PROVIDER NOTE - OBJECTIVE STATEMENT
27-year-old male with history of type 1 diabetes presents to the ED for nausea vomiting diarrhea.  Patient states had multiple episodes of watery diarrhea since this morning. Patient states he is feeling weakness. No fever, chills, abdominal pain, cough, chest pain.

## 2023-02-27 NOTE — ED PROVIDER NOTE - CRITICAL CARE ATTENDING CONTRIBUTION TO CARE
Evaluated with HUANG Phelps.   27-year-old male with history of type 1 diabetes presents to the ED for nausea vomiting diarrhea x 3 days. no fevers. no abd pain. possible recent abx use. had + ketones at home, prompting ED visit.     CONSTITUTIONAL: Well-developed; well-nourished; in no acute distress.   SKIN: warm, dry  HEAD: Normocephalic; atraumatic.  EYES: PERRL, EOMI, no conjunctival erythema  ENT: No nasal discharge; airway clear.  NECK: Supple; non tender.  CARD: S1, S2 normal; no murmurs, gallops, or rubs. Regular rate and rhythm.   RESP: No wheezes, rales or rhonchi.  ABD: soft ntnd  EXT: Normal ROM.  No clubbing, cyanosis or edema.   NEURO: Alert, oriented, grossly unremarkable.  PSYCH: Cooperative, appropriate.

## 2023-02-27 NOTE — ED ADULT NURSE NOTE - NSIMPLEMENTINTERV_GEN_ALL_ED
Implemented All Universal Safety Interventions:  [place] to call system. Call bell, personal items and telephone within reach. Instruct patient to call for assistance. Room bathroom lighting operational. Non-slip footwear when patient is off stretcher. Physically safe environment: no spills, clutter or unnecessary equipment. Stretcher in lowest position, wheels locked, appropriate side rails in place.

## 2023-02-28 LAB
ALBUMIN SERPL ELPH-MCNC: 3.4 G/DL — LOW (ref 3.5–5.2)
ALP SERPL-CCNC: 93 U/L — SIGNIFICANT CHANGE UP (ref 30–115)
ALT FLD-CCNC: 20 U/L — SIGNIFICANT CHANGE UP (ref 0–41)
ANION GAP SERPL CALC-SCNC: 13 MMOL/L — SIGNIFICANT CHANGE UP (ref 7–14)
ANION GAP SERPL CALC-SCNC: 9 MMOL/L — SIGNIFICANT CHANGE UP (ref 7–14)
AST SERPL-CCNC: 15 U/L — SIGNIFICANT CHANGE UP (ref 0–41)
BASOPHILS # BLD AUTO: 0.02 K/UL — SIGNIFICANT CHANGE UP (ref 0–0.2)
BASOPHILS NFR BLD AUTO: 0.6 % — SIGNIFICANT CHANGE UP (ref 0–1)
BILIRUB SERPL-MCNC: 0.3 MG/DL — SIGNIFICANT CHANGE UP (ref 0.2–1.2)
BUN SERPL-MCNC: 6 MG/DL — LOW (ref 10–20)
BUN SERPL-MCNC: 6 MG/DL — LOW (ref 10–20)
CALCIUM SERPL-MCNC: 8.3 MG/DL — LOW (ref 8.4–10.5)
CALCIUM SERPL-MCNC: 8.7 MG/DL — SIGNIFICANT CHANGE UP (ref 8.4–10.5)
CHLORIDE SERPL-SCNC: 106 MMOL/L — SIGNIFICANT CHANGE UP (ref 98–110)
CHLORIDE SERPL-SCNC: 109 MMOL/L — SIGNIFICANT CHANGE UP (ref 98–110)
CO2 SERPL-SCNC: 18 MMOL/L — SIGNIFICANT CHANGE UP (ref 17–32)
CO2 SERPL-SCNC: 23 MMOL/L — SIGNIFICANT CHANGE UP (ref 17–32)
CREAT SERPL-MCNC: 0.6 MG/DL — LOW (ref 0.7–1.5)
CREAT SERPL-MCNC: 0.7 MG/DL — SIGNIFICANT CHANGE UP (ref 0.7–1.5)
EGFR: 130 ML/MIN/1.73M2 — SIGNIFICANT CHANGE UP
EGFR: 136 ML/MIN/1.73M2 — SIGNIFICANT CHANGE UP
EOSINOPHIL # BLD AUTO: 0.23 K/UL — SIGNIFICANT CHANGE UP (ref 0–0.7)
EOSINOPHIL NFR BLD AUTO: 6.9 % — SIGNIFICANT CHANGE UP (ref 0–8)
GLUCOSE BLDC GLUCOMTR-MCNC: 118 MG/DL — HIGH (ref 70–99)
GLUCOSE BLDC GLUCOMTR-MCNC: 135 MG/DL — HIGH (ref 70–99)
GLUCOSE BLDC GLUCOMTR-MCNC: 151 MG/DL — HIGH (ref 70–99)
GLUCOSE BLDC GLUCOMTR-MCNC: 169 MG/DL — HIGH (ref 70–99)
GLUCOSE BLDC GLUCOMTR-MCNC: 175 MG/DL — HIGH (ref 70–99)
GLUCOSE BLDC GLUCOMTR-MCNC: 176 MG/DL — HIGH (ref 70–99)
GLUCOSE BLDC GLUCOMTR-MCNC: 180 MG/DL — HIGH (ref 70–99)
GLUCOSE BLDC GLUCOMTR-MCNC: 191 MG/DL — HIGH (ref 70–99)
GLUCOSE BLDC GLUCOMTR-MCNC: 211 MG/DL — HIGH (ref 70–99)
GLUCOSE BLDC GLUCOMTR-MCNC: 212 MG/DL — HIGH (ref 70–99)
GLUCOSE BLDC GLUCOMTR-MCNC: 231 MG/DL — HIGH (ref 70–99)
GLUCOSE BLDC GLUCOMTR-MCNC: 232 MG/DL — HIGH (ref 70–99)
GLUCOSE BLDC GLUCOMTR-MCNC: 263 MG/DL — HIGH (ref 70–99)
GLUCOSE BLDC GLUCOMTR-MCNC: 303 MG/DL — HIGH (ref 70–99)
GLUCOSE BLDC GLUCOMTR-MCNC: 85 MG/DL — SIGNIFICANT CHANGE UP (ref 70–99)
GLUCOSE SERPL-MCNC: 187 MG/DL — HIGH (ref 70–99)
GLUCOSE SERPL-MCNC: 67 MG/DL — LOW (ref 70–99)
HCT VFR BLD CALC: 37.7 % — LOW (ref 42–52)
HGB BLD-MCNC: 12.7 G/DL — LOW (ref 14–18)
IMM GRANULOCYTES NFR BLD AUTO: 0.3 % — SIGNIFICANT CHANGE UP (ref 0.1–0.3)
LYMPHOCYTES # BLD AUTO: 1.3 K/UL — SIGNIFICANT CHANGE UP (ref 1.2–3.4)
LYMPHOCYTES # BLD AUTO: 39 % — SIGNIFICANT CHANGE UP (ref 20.5–51.1)
MAGNESIUM SERPL-MCNC: 1.8 MG/DL — SIGNIFICANT CHANGE UP (ref 1.8–2.4)
MCHC RBC-ENTMCNC: 29.6 PG — SIGNIFICANT CHANGE UP (ref 27–31)
MCHC RBC-ENTMCNC: 33.7 G/DL — SIGNIFICANT CHANGE UP (ref 32–37)
MCV RBC AUTO: 87.9 FL — SIGNIFICANT CHANGE UP (ref 80–94)
MONOCYTES # BLD AUTO: 0.44 K/UL — SIGNIFICANT CHANGE UP (ref 0.1–0.6)
MONOCYTES NFR BLD AUTO: 13.2 % — HIGH (ref 1.7–9.3)
NEUTROPHILS # BLD AUTO: 1.33 K/UL — LOW (ref 1.4–6.5)
NEUTROPHILS NFR BLD AUTO: 40 % — LOW (ref 42.2–75.2)
NRBC # BLD: 0 /100 WBCS — SIGNIFICANT CHANGE UP (ref 0–0)
PHOSPHATE SERPL-MCNC: 3.2 MG/DL — SIGNIFICANT CHANGE UP (ref 2.1–4.9)
PLATELET # BLD AUTO: 234 K/UL — SIGNIFICANT CHANGE UP (ref 130–400)
POTASSIUM SERPL-MCNC: 4 MMOL/L — SIGNIFICANT CHANGE UP (ref 3.5–5)
POTASSIUM SERPL-MCNC: 4.2 MMOL/L — SIGNIFICANT CHANGE UP (ref 3.5–5)
POTASSIUM SERPL-SCNC: 4 MMOL/L — SIGNIFICANT CHANGE UP (ref 3.5–5)
POTASSIUM SERPL-SCNC: 4.2 MMOL/L — SIGNIFICANT CHANGE UP (ref 3.5–5)
PROT SERPL-MCNC: 5.2 G/DL — LOW (ref 6–8)
RBC # BLD: 4.29 M/UL — LOW (ref 4.7–6.1)
RBC # FLD: 11.5 % — SIGNIFICANT CHANGE UP (ref 11.5–14.5)
SODIUM SERPL-SCNC: 137 MMOL/L — SIGNIFICANT CHANGE UP (ref 135–146)
SODIUM SERPL-SCNC: 141 MMOL/L — SIGNIFICANT CHANGE UP (ref 135–146)
WBC # BLD: 3.33 K/UL — LOW (ref 4.8–10.8)
WBC # FLD AUTO: 3.33 K/UL — LOW (ref 4.8–10.8)

## 2023-02-28 PROCEDURE — 99223 1ST HOSP IP/OBS HIGH 75: CPT

## 2023-02-28 RX ORDER — INSULIN LISPRO 100/ML
VIAL (ML) SUBCUTANEOUS
Refills: 0 | Status: DISCONTINUED | OUTPATIENT
Start: 2023-02-28 | End: 2023-03-01

## 2023-02-28 RX ORDER — SODIUM CHLORIDE 9 MG/ML
1000 INJECTION, SOLUTION INTRAVENOUS
Refills: 0 | Status: DISCONTINUED | OUTPATIENT
Start: 2023-02-28 | End: 2023-03-01

## 2023-02-28 RX ORDER — DEXTROSE 50 % IN WATER 50 %
15 SYRINGE (ML) INTRAVENOUS ONCE
Refills: 0 | Status: DISCONTINUED | OUTPATIENT
Start: 2023-02-28 | End: 2023-03-01

## 2023-02-28 RX ORDER — INSULIN GLARGINE 100 [IU]/ML
30 INJECTION, SOLUTION SUBCUTANEOUS EVERY MORNING
Refills: 0 | Status: DISCONTINUED | OUTPATIENT
Start: 2023-02-28 | End: 2023-03-01

## 2023-02-28 RX ORDER — INSULIN LISPRO 100/ML
VIAL (ML) SUBCUTANEOUS AT BEDTIME
Refills: 0 | Status: DISCONTINUED | OUTPATIENT
Start: 2023-02-28 | End: 2023-03-01

## 2023-02-28 RX ORDER — GLUCAGON INJECTION, SOLUTION 0.5 MG/.1ML
1 INJECTION, SOLUTION SUBCUTANEOUS ONCE
Refills: 0 | Status: DISCONTINUED | OUTPATIENT
Start: 2023-02-28 | End: 2023-03-01

## 2023-02-28 RX ORDER — INSULIN LISPRO 100/ML
8 VIAL (ML) SUBCUTANEOUS
Refills: 0 | Status: DISCONTINUED | OUTPATIENT
Start: 2023-02-28 | End: 2023-03-01

## 2023-02-28 RX ORDER — DEXTROSE 50 % IN WATER 50 %
25 SYRINGE (ML) INTRAVENOUS ONCE
Refills: 0 | Status: DISCONTINUED | OUTPATIENT
Start: 2023-02-28 | End: 2023-03-01

## 2023-02-28 RX ORDER — DEXTROSE MONOHYDRATE, SODIUM CHLORIDE, AND POTASSIUM CHLORIDE 50; .745; 4.5 G/1000ML; G/1000ML; G/1000ML
1000 INJECTION, SOLUTION INTRAVENOUS
Refills: 0 | Status: DISCONTINUED | OUTPATIENT
Start: 2023-02-28 | End: 2023-02-28

## 2023-02-28 RX ORDER — VANCOMYCIN HCL 1 G
125 VIAL (EA) INTRAVENOUS EVERY 6 HOURS
Refills: 0 | Status: DISCONTINUED | OUTPATIENT
Start: 2023-02-28 | End: 2023-03-01

## 2023-02-28 RX ORDER — DEXTROSE 50 % IN WATER 50 %
12.5 SYRINGE (ML) INTRAVENOUS ONCE
Refills: 0 | Status: DISCONTINUED | OUTPATIENT
Start: 2023-02-28 | End: 2023-03-01

## 2023-02-28 RX ADMIN — Medication 8 UNIT(S): at 11:24

## 2023-02-28 RX ADMIN — Medication 125 MILLIGRAM(S): at 11:24

## 2023-02-28 RX ADMIN — Medication 125 MILLIGRAM(S): at 05:08

## 2023-02-28 RX ADMIN — Medication 8 UNIT(S): at 16:12

## 2023-02-28 RX ADMIN — DEXTROSE MONOHYDRATE, SODIUM CHLORIDE, AND POTASSIUM CHLORIDE 200 MILLILITER(S): 50; .745; 4.5 INJECTION, SOLUTION INTRAVENOUS at 03:28

## 2023-02-28 RX ADMIN — Medication 4: at 16:13

## 2023-02-28 RX ADMIN — Medication 125 MILLIGRAM(S): at 17:50

## 2023-02-28 RX ADMIN — INSULIN GLARGINE 30 UNIT(S): 100 INJECTION, SOLUTION SUBCUTANEOUS at 07:53

## 2023-02-28 RX ADMIN — Medication 6: at 11:25

## 2023-02-28 NOTE — CONSULT NOTE ADULT - ASSESSMENT
IMPRESSION:    DKA improved  C Diff   HO DM     PLAN:    CNS:  No depressants     HEENT: Oral care    PULMONARY:  HOB @ 45 degrees.  Aspiration precautions.  On RA.      CARDIOVASCULAR:  Goal directed fluid resuscitation.  DC IVF once tolerating PO     GI: GI prophylaxis.  Feeding.  Bowel regimen.  GI eval.  Lipase noted     RENAL:  Follow up lytes.  Correct as needed    INFECTIOUS DISEASE: Follow up cultures.  Vanc oral     HEMATOLOGICAL:  DVT prophylaxis.  Dimer     ENDOCRINE:  Follow up FS.  Insulin protocol if needed.  Switch to SQ insulin once tolerating PO.  Endo follow up     MUSCULOSKELETAL:  OOB to chair and ambulate as tolerated      DGTF once off IV Insulin

## 2023-02-28 NOTE — CONSULT NOTE ADULT - SUBJECTIVE AND OBJECTIVE BOX
Request for consultation:  Requested by:    Patient is a 27y old  Male who presents with a chief complaint of DKA, C. Diff (28 Feb 2023 07:34)    HPI:  HPI: 27M 26-year-old, M patient with PMHx of Type I DM, presented to the ED for nausea vomiting and diarrhea. History goes back to 3 days prior to presentation when the patient started having nausea, vomiting and diarrhea. NBNB. The patient also reports decreased po intake. Reports that he is compliant with his insulin regimen.     ED COURSE: Noted to have mild DKA, Given 2L NS, zofran and pepcid. Admitted to ICU.     ICU Vital Signs Last 24 Hrs  T(C): 36.4 (27 Feb 2023 18:56), Max: 36.4 (27 Feb 2023 18:56)  T(F): 97.5 (27 Feb 2023 18:56), Max: 97.5 (27 Feb 2023 18:56)  HR: 76 (27 Feb 2023 18:56) (76 - 76)  BP: 118/76 (27 Feb 2023 18:56) (118/76 - 118/76)  BP(mean): --  ABP: --  ABP(mean): --  RR: 18 (27 Feb 2023 18:56) (18 - 18)  SpO2: --     (27 Feb 2023 21:39)      FAMILY HISTORY:    PAST MEDICAL & SURGICAL HISTORY:  Type 1 diabetes mellitus  Diagnosed in 2005, managed by Dr. Johnston      Hypercholesterolemia  currently on Lipitor        Birth History:  Developmental History:    Review of Systems:  All review of systems negative, except for those marked:  General:		[] Abnormal:  Pulmonary:		[] Abnormal:  Cardiac:		[] Abnormal:  Gastrointestinal:	[] Abnormal:  ENT:			[] Abnormal:  Renal/Urologic:		[] Abnormal:  Musculoskeletal:	[] Abnormal:  Endocrine:		[] Abnormal:  Hematologic:		[] Abnormal:  Neurologic:		[] Abnormal:  Skin:			[] Abnormal:  Allergy/Immune:	[] Abnormal:  Psychiatric:		[] Abnormal:    Allergies    No Known Allergies    Intolerances      MEDICATIONS  (STANDING):  dextrose 5%. 1000 milliLiter(s) (50 mL/Hr) IV Continuous <Continuous>  dextrose 5%. 1000 milliLiter(s) (100 mL/Hr) IV Continuous <Continuous>  dextrose 50% Injectable 25 Gram(s) IV Push once  dextrose 50% Injectable 12.5 Gram(s) IV Push once  dextrose 50% Injectable 25 Gram(s) IV Push once  glucagon  Injectable 1 milliGRAM(s) IntraMuscular once  insulin glargine Injectable (LANTUS) 30 Unit(s) SubCutaneous every morning  insulin lispro (ADMELOG) corrective regimen sliding scale   SubCutaneous three times a day before meals  insulin lispro (ADMELOG) corrective regimen sliding scale   SubCutaneous at bedtime  insulin lispro Injectable (ADMELOG) 8 Unit(s) SubCutaneous three times a day before meals  vancomycin    Solution 125 milliGRAM(s) Oral every 6 hours    MEDICATIONS  (PRN):  dextrose Oral Gel 15 Gram(s) Oral once PRN Blood Glucose LESS THAN 70 milliGRAM(s)/deciliter      Vital Signs Last 24 Hrs  T(C): 36.4 (27 Feb 2023 18:56), Max: 36.4 (27 Feb 2023 18:56)  T(F): 97.5 (27 Feb 2023 18:56), Max: 97.5 (27 Feb 2023 18:56)  HR: 74 (28 Feb 2023 06:18) (74 - 103)  BP: 96/64 (28 Feb 2023 06:18) (96/52 - 119/69)  BP(mean): --  RR: 18 (28 Feb 2023 06:18) (18 - 18)  SpO2: 100% (28 Feb 2023 06:18) (100% - 100%)    Parameters below as of 28 Feb 2023 06:18  Patient On (Oxygen Delivery Method): room air      Height (cm): 175.3 (02-27 @ 18:56)  Weight (kg): 63.5 (02-27 @ 18:56)  BMI (kg/m2): 20.7 (02-27 @ 18:56)    PHYSICAL EXAM  All physical exam findings normal, except those marked:  General:	Alert, active, cooperative, NAD, well hydrated  Neck		Normal: supple, no cervical adenopathy, no palpable thyroid  Cardiovascular	Normal: regular rate, normal S1, S2, no murmurs  Respiratory	Normal: no chest wall deformity, normal respiratory pattern, CTA B/L  Abdominal	Normal: soft, ND, NT, bowel sounds present, no masses, no organomegaly  		Normal normal genitalia, testes descended, circumcised/uncircumcised  .		Mireya stage:			Breast mireya:  .		Menstrual history:  Extremities	Normal: FROM x4  Skin		Normal: intact and not indurated, no rash, no acanthosis nigricans  Neurologic	Normal: grossly intact    LABS  VBG - ( 27 Feb 2023 19:36 )  pH: 7.29  /  pCO2: 46    /  pO2: 26    / HCO3: 22    / Base Excess: -4.7  /  SvO2: 44.2  / Lactate: 1.10                           12.7   3.33  )-----------( 234      ( 28 Feb 2023 07:00 )             37.7     02-28    137  |  106  |  6<L>  ----------------------------<  187<H>  4.2   |  18  |  0.6<L>    Ca    8.3<L>      28 Feb 2023 07:00  Phos  3.2     02-28  Mg     1.8     02-28    TPro  5.2<L>  /  Alb  3.4<L>  /  TBili  0.3  /  DBili  x   /  AST  15  /  ALT  20  /  AlkPhos  93  02-28      Ketone - Urine: Moderate (02-27 @ 20:35)    CAPILLARY BLOOD GLUCOSE      POCT Blood Glucose.: 135 mg/dL (28 Feb 2023 09:11)  POCT Blood Glucose.: 169 mg/dL (28 Feb 2023 08:05)  POCT Blood Glucose.: 151 mg/dL (28 Feb 2023 07:18)  POCT Blood Glucose.: 180 mg/dL (28 Feb 2023 06:17)  POCT Blood Glucose.: 191 mg/dL (28 Feb 2023 05:07)  POCT Blood Glucose.: 175 mg/dL (28 Feb 2023 03:59)  POCT Blood Glucose.: 85 mg/dL (28 Feb 2023 03:01)  POCT Blood Glucose.: 118 mg/dL (28 Feb 2023 02:06)  POCT Blood Glucose.: 176 mg/dL (28 Feb 2023 01:03)  POCT Blood Glucose.: 232 mg/dL (28 Feb 2023 00:01)  POCT Blood Glucose.: 401 mg/dL (27 Feb 2023 21:59)  
Patient is a 27y old  Male who presents with a chief complaint of DKA, C. Diff (2023 21:39)      HPI:  HPI: 27M 26-year-old, M patient with PMHx of Type I DM, presented to the ED for nausea vomiting and diarrhea. History goes back to 3 days prior to presentation when the patient started having nausea, vomiting and diarrhea. NBNB. The patient also reports decreased po intake. Reports that he is compliant with his insulin regimen.     ED COURSE: Noted to have mild DKA, Given 2L NS, zofran and pepcid. Admitted to ICU.     ICU Vital Signs Last 24 Hrs  T(C): 36.4 (2023 18:56), Max: 36.4 (2023 18:56)  T(F): 97.5 (2023 18:56), Max: 97.5 (2023 18:56)  HR: 76 (2023 18:56) (76 - 76)  BP: 118/76 (2023 18:56) (118/76 - 118/76)  BP(mean): --  ABP: --  ABP(mean): --  RR: 18 (2023 18:56) (18 - 18)  SpO2: --     (2023 21:39)      PAST MEDICAL & SURGICAL HISTORY:  Type 1 diabetes mellitus  Diagnosed in , managed by Dr. Johnston      Hypercholesterolemia  currently on Lipitor          SOCIAL HX:   Smoking     NO                    ETOH                            Other    FAMILY HISTORY:  :  No known cardiovacular family hisotry     Review Of Systems:     All ROS are negative except per HPI       Allergies    No Known Allergies    Intolerances          PHYSICAL EXAM    ICU Vital Signs Last 24 Hrs  T(C): 36.4 (2023 18:56), Max: 36.4 (2023 18:56)  T(F): 97.5 (2023 18:56), Max: 97.5 (2023 18:56)  HR: 74 (2023 06:18) (74 - 103)  BP: 96/64 (2023 06:18) (96/52 - 119/69)  BP(mean): --  ABP: --  ABP(mean): --  RR: 18 (2023 06:18) (18 - 18)  SpO2: 100% (2023 06:18) (100% - 100%)    O2 Parameters below as of 2023 06:18  Patient On (Oxygen Delivery Method): room air            CONSTITUTIONAL:  Well nourished.   NAD    ENT:   Airway patent,   Mouth with normal mucosa.   No thrush      CARDIAC:   Normal rate,   Regular rhythm.    No edema      RESPIRATORY:   No wheezing  Bilateral BS   Not tachypneic,  No use of accessory muscles    GASTROINTESTINAL:  Abdomen soft,   Non-tender,   No guarding,   + BS      NEUROLOGICAL:   Alert and oriented   No motor deficits.    SKIN:   Skin normal color for race,   No evidence of rash.                    LABS:                          15.6   4.61  )-----------( 272      ( 2023 20:18 )             44.9                                                   141  |  109  |  6<L>  ----------------------------<  67<L>  4.0   |  23  |  0.7    AG 9     Ca    8.7      2023 04:00    TPro  6.5  /  Alb  4.1  /  TBili  0.8  /  DBili  0.2  /  AST  22  /  ALT  27  /  AlkPhos  114                                               Urinalysis Basic - ( 2023 20:35 )    Color: Light Yellow / Appearance: Clear / S.031 / pH: x  Gluc: x / Ketone: Moderate  / Bili: Negative / Urobili: <2 mg/dL   Blood: x / Protein: Negative / Nitrite: Negative   Leuk Esterase: Negative / RBC: x / WBC x   Sq Epi: x / Non Sq Epi: x / Bacteria: x                                                  LIVER FUNCTIONS - ( 2023 20:18 )  Alb: 4.1 g/dL / Pro: 6.5 g/dL / ALK PHOS: 114 U/L / ALT: 27 U/L / AST: 22 U/L / GGT: x                                                                                                                                       X-Rays reviewed                                                                                     ECHO    CXR interpreted by me     MEDICATIONS  (STANDING):  dextrose 5% + sodium chloride 0.45% with potassium chloride 20 mEq/L 1000 milliLiter(s) (150 mL/Hr) IV Continuous <Continuous>  insulin regular Infusion 3 Unit(s)/Hr (3 mL/Hr) IV Continuous <Continuous>  vancomycin    Solution 125 milliGRAM(s) Oral every 6 hours    MEDICATIONS  (PRN):

## 2023-02-28 NOTE — CONSULT NOTE ADULT - ASSESSMENT
27y old  Male with hx of DM 1 who presents with a chief complaint of DKA, C. Diff. we are consulted for insulin management    # DM type 1  # mild DKA  - Patient presented with abd pain, nausea and diarrhea  - AG 16. BHB 2.3, gluc 400. started on insulin drip  - AG closed and patient switched to basal/bolus 30/8/8/8 and scale  - last A1c per patient 8.4. on our record last a1c 9 (May 2022)  - home regimen is lantus 26 units at bedtime and lispro according to carb and gluc intake 27y old  Male with hx of DM 1 who presents with a chief complaint of DKA, C. Diff. we are consulted for insulin management    # DM type 1  # mild DKA  - Patient presented with abd pain, nausea and diarrhea  - AG 16. BHB 2.3, gluc 400. started on insulin drip  - AG closed and patient switched to basal/bolus 30/8/8/8 and scale  - last A1c per patient 8.4. on our record last a1c 9 (May 2022)  - home regimen is lantus 26 units at bedtime and lispro according to carb and gluc intake    recs  - continue inpatient management of hyperglycemia  - on discharge patient can go back to his home regime  - f/u as outpatient in endo clinic

## 2023-02-28 NOTE — CONSULT NOTE ADULT - ATTENDING COMMENTS
Type 1 DM-mild DKA in setting of Cdiff colitis.  DKA resolved.  Continue basal/bolus therapy-likely d/c home tomorrow.

## 2023-03-01 ENCOUNTER — TRANSCRIPTION ENCOUNTER (OUTPATIENT)
Age: 28
End: 2023-03-01

## 2023-03-01 VITALS
HEART RATE: 88 BPM | RESPIRATION RATE: 18 BRPM | TEMPERATURE: 97 F | SYSTOLIC BLOOD PRESSURE: 115 MMHG | DIASTOLIC BLOOD PRESSURE: 68 MMHG | OXYGEN SATURATION: 97 %

## 2023-03-01 LAB
A1C WITH ESTIMATED AVERAGE GLUCOSE RESULT: 8.6 % — HIGH (ref 4–5.6)
ALBUMIN SERPL ELPH-MCNC: 3.6 G/DL — SIGNIFICANT CHANGE UP (ref 3.5–5.2)
ALP SERPL-CCNC: 96 U/L — SIGNIFICANT CHANGE UP (ref 30–115)
ALT FLD-CCNC: 24 U/L — SIGNIFICANT CHANGE UP (ref 0–41)
ANION GAP SERPL CALC-SCNC: 10 MMOL/L — SIGNIFICANT CHANGE UP (ref 7–14)
AST SERPL-CCNC: 26 U/L — SIGNIFICANT CHANGE UP (ref 0–41)
BASOPHILS # BLD AUTO: 0.03 K/UL — SIGNIFICANT CHANGE UP (ref 0–0.2)
BASOPHILS NFR BLD AUTO: 0.8 % — SIGNIFICANT CHANGE UP (ref 0–1)
BILIRUB SERPL-MCNC: <0.2 MG/DL — SIGNIFICANT CHANGE UP (ref 0.2–1.2)
BUN SERPL-MCNC: 11 MG/DL — SIGNIFICANT CHANGE UP (ref 10–20)
CALCIUM SERPL-MCNC: 8.9 MG/DL — SIGNIFICANT CHANGE UP (ref 8.4–10.5)
CHLORIDE SERPL-SCNC: 107 MMOL/L — SIGNIFICANT CHANGE UP (ref 98–110)
CO2 SERPL-SCNC: 24 MMOL/L — SIGNIFICANT CHANGE UP (ref 17–32)
CREAT SERPL-MCNC: 0.7 MG/DL — SIGNIFICANT CHANGE UP (ref 0.7–1.5)
EGFR: 130 ML/MIN/1.73M2 — SIGNIFICANT CHANGE UP
EOSINOPHIL # BLD AUTO: 0.27 K/UL — SIGNIFICANT CHANGE UP (ref 0–0.7)
EOSINOPHIL NFR BLD AUTO: 6.9 % — SIGNIFICANT CHANGE UP (ref 0–8)
ESTIMATED AVERAGE GLUCOSE: 200 MG/DL — HIGH (ref 68–114)
GLUCOSE BLDC GLUCOMTR-MCNC: 148 MG/DL — HIGH (ref 70–99)
GLUCOSE BLDC GLUCOMTR-MCNC: 152 MG/DL — HIGH (ref 70–99)
GLUCOSE BLDC GLUCOMTR-MCNC: 169 MG/DL — HIGH (ref 70–99)
GLUCOSE BLDC GLUCOMTR-MCNC: 257 MG/DL — HIGH (ref 70–99)
GLUCOSE BLDC GLUCOMTR-MCNC: 273 MG/DL — HIGH (ref 70–99)
GLUCOSE SERPL-MCNC: 125 MG/DL — HIGH (ref 70–99)
HCT VFR BLD CALC: 40.8 % — LOW (ref 42–52)
HGB BLD-MCNC: 13.7 G/DL — LOW (ref 14–18)
IMM GRANULOCYTES NFR BLD AUTO: 0.3 % — SIGNIFICANT CHANGE UP (ref 0.1–0.3)
LYMPHOCYTES # BLD AUTO: 1.5 K/UL — SIGNIFICANT CHANGE UP (ref 1.2–3.4)
LYMPHOCYTES # BLD AUTO: 38.3 % — SIGNIFICANT CHANGE UP (ref 20.5–51.1)
MAGNESIUM SERPL-MCNC: 1.7 MG/DL — LOW (ref 1.8–2.4)
MCHC RBC-ENTMCNC: 29.5 PG — SIGNIFICANT CHANGE UP (ref 27–31)
MCHC RBC-ENTMCNC: 33.6 G/DL — SIGNIFICANT CHANGE UP (ref 32–37)
MCV RBC AUTO: 87.9 FL — SIGNIFICANT CHANGE UP (ref 80–94)
MONOCYTES # BLD AUTO: 0.43 K/UL — SIGNIFICANT CHANGE UP (ref 0.1–0.6)
MONOCYTES NFR BLD AUTO: 11 % — HIGH (ref 1.7–9.3)
NEUTROPHILS # BLD AUTO: 1.68 K/UL — SIGNIFICANT CHANGE UP (ref 1.4–6.5)
NEUTROPHILS NFR BLD AUTO: 42.7 % — SIGNIFICANT CHANGE UP (ref 42.2–75.2)
NRBC # BLD: 0 /100 WBCS — SIGNIFICANT CHANGE UP (ref 0–0)
PLATELET # BLD AUTO: 277 K/UL — SIGNIFICANT CHANGE UP (ref 130–400)
POTASSIUM SERPL-MCNC: 4 MMOL/L — SIGNIFICANT CHANGE UP (ref 3.5–5)
POTASSIUM SERPL-SCNC: 4 MMOL/L — SIGNIFICANT CHANGE UP (ref 3.5–5)
PROT SERPL-MCNC: 5.8 G/DL — LOW (ref 6–8)
RBC # BLD: 4.64 M/UL — LOW (ref 4.7–6.1)
RBC # FLD: 11.5 % — SIGNIFICANT CHANGE UP (ref 11.5–14.5)
SODIUM SERPL-SCNC: 141 MMOL/L — SIGNIFICANT CHANGE UP (ref 135–146)
WBC # BLD: 3.92 K/UL — LOW (ref 4.8–10.8)
WBC # FLD AUTO: 3.92 K/UL — LOW (ref 4.8–10.8)

## 2023-03-01 PROCEDURE — 99239 HOSP IP/OBS DSCHRG MGMT >30: CPT

## 2023-03-01 RX ORDER — VANCOMYCIN HCL 1 G
1 VIAL (EA) INTRAVENOUS
Qty: 36 | Refills: 0
Start: 2023-03-01 | End: 2023-03-09

## 2023-03-01 RX ORDER — INSULIN GLARGINE 100 [IU]/ML
30 INJECTION, SOLUTION SUBCUTANEOUS
Qty: 0 | Refills: 0 | DISCHARGE

## 2023-03-01 RX ORDER — INSULIN LISPRO 100/ML
5 VIAL (ML) SUBCUTANEOUS ONCE
Refills: 0 | Status: COMPLETED | OUTPATIENT
Start: 2023-03-01 | End: 2023-03-01

## 2023-03-01 RX ADMIN — Medication 5 UNIT(S): at 01:36

## 2023-03-01 RX ADMIN — INSULIN GLARGINE 30 UNIT(S): 100 INJECTION, SOLUTION SUBCUTANEOUS at 08:06

## 2023-03-01 RX ADMIN — Medication 125 MILLIGRAM(S): at 00:38

## 2023-03-01 RX ADMIN — Medication 125 MILLIGRAM(S): at 05:35

## 2023-03-01 NOTE — DISCHARGE NOTE NURSING/CASE MANAGEMENT/SOCIAL WORK - PATIENT PORTAL LINK FT
You can access the FollowMyHealth Patient Portal offered by [facility] by registering at the following website: [email or web address]. By joining [employer]’s FollowMyHealth portal, you will also be able to view your health information using other applications (apps) compatible with our system.

## 2023-03-01 NOTE — DISCHARGE NOTE PROVIDER - CARE PROVIDER_API CALL
Nicolas Reyes ()  Internal Medicine  [address] [facility], Admin - Room [address] [address]  Phone: [phone]  Fax: [phone]  Follow Up Time: 2 weeks    Jenny López)  Medicine  [address] [place] [address]  Phone: [phone]  Fax: [phone]  Established Patient  Follow Up Time: 2 weeks

## 2023-03-01 NOTE — DISCHARGE NOTE PROVIDER - NSDCCPCAREPLAN_GEN_ALL_CORE_FT
PRINCIPAL DISCHARGE DIAGNOSIS  Diagnosis: DKA (diabetic ketoacidosis)  Assessment and Plan of Treatment: Diabetic ketoacidosis is a serious problem that can happen to people with diabetes. It happens when chemicals called "ketones" build up in the blood. Normally, the body breaks down sugar as a source of energy. For this to happen, sugar gets into cells with the help of a hormone called "insulin." If there is not enough insulin, or if the body stops responding to insulin, sugar builds up in the blood. That is what happens when a person has diabetes.  If sugar cannot get into the cells, the body is unable to use it for energy. In severe cases, blood sugar levels continue to increase and the body starts burning fat for energy instead. By burning, this creates a biproduct called "ketones." Ordinarily, these ketones are not dangerous, but if ketone levels become too high, this can rapidly increase the blood's acidity level. This ultimately leads to DKA, which is a medical emergency that need to be treated right away.   In your case, this was likely triggered by an infection called "C diff" as mentioned below. Please continue to take your insulin regimen as previously prescribed by your endocrinologist.  Additionally, please follow up with your endocrinologist within two weeks after discharge to update them on your recent hospitalization and to review any changes in your medications.      SECONDARY DISCHARGE DIAGNOSES  Diagnosis: C. difficile diarrhea  Assessment and Plan of Treatment: You came to the hospital with diarrhea. Stool testing demonstrated that you have an infection called Clostridium difficile (commonly abbrevaited as "C diff"). To treat this infection, you were started on oral antibiotics (vancomycin). Please continue to take these antibiotics as prescribed for 10 days total to ensure adequate treatment and adequate erradication of this infection.  Additionally, please follow up with your primary care physician within two weeks after discharge to update them on your recent hospitalization and to review any changes in your medications. If you do not have a primary care physician, the contact information of one (Dr. Reyes) has been provided with these discharge instructions.

## 2023-03-01 NOTE — DISCHARGE NOTE PROVIDER - NSDCQMERRANDS_GEN_ALL_CORE
Encourage self breast exam monthly  Regular exercise  Follow up September for repeat pelvic ultrasound to check fibroids   No

## 2023-03-01 NOTE — DISCHARGE NOTE PROVIDER - NSDCMRMEDTOKEN_GEN_ALL_CORE_FT
Admelog 100 units/mL injectable solution: 8 unit(s) subcutaneous 3 times a day (before meals) as per your own home regimen using calculations based on your fingersticks and the amount of carb intake per each meal.   Basaglar KwikPen 100 units/mL subcutaneous solution: 26 unit(s) subcutaneous once a day (at bedtime)  DULOXETINE HCL DR 20 MG CAP: TAKE 1 CAPSULE BY MOUTH EVERY MORNING AFTER MEAL  vancomycin 125 mg oral capsule: 1 cap(s) orally every 6 hours

## 2023-03-01 NOTE — DISCHARGE NOTE PROVIDER - PROVIDER TOKENS
PROVIDER:[TOKEN:[[id number]],FOLLOWUP:[2 weeks]],PROVIDER:[TOKEN:[[id number]],FOLLOWUP:[2 weeks],ESTABLISHEDPATIENT:[T]]

## 2023-03-01 NOTE — DISCHARGE NOTE PROVIDER - HOSPITAL COURSE
27M w/ h/o Type I DM, presented to the ED for nausea vomiting and diarrhea. History goes back to 3 days prior to presentation when the patient started having nausea, vomiting and diarrhea. NBNB. The patient also reports decreased po intake. Reports that he is compliant with his insulin regimen.     ED COURSE: Noted to have mild DKA, Given 2L NS, zofran and pepcid. Tested positive for C Diff. Admitted to ICU.     ICU Vital Signs Last 24 Hrs  T(C): 36.4 (27 Feb 2023 18:56), Max: 36.4 (27 Feb 2023 18:56)  T(F): 97.5 (27 Feb 2023 18:56), Max: 97.5 (27 Feb 2023 18:56)  HR: 76 (27 Feb 2023 18:56) (76 - 76)  BP: 118/76 (27 Feb 2023 18:56) (118/76 - 118/76)  RR: 18 (27 Feb 2023 18:56) (18 - 18)  SpO2: --    ICU COURSE:  Started on PO vanc for C diff infection. Started on insulin gtt. Transitioned to basal/bolus insulin after AG closed and tolerating PO diet. Downgraded to floors on 2/28.    FLOORS COURSE:  Continues to tolerate PO diet. Diarrhea has improved. Repeat BMP demonstrates AG remains closed. Overall medically stable for discharge at this time.

## 2023-03-03 LAB — GLUCOSE BLDC GLUCOMTR-MCNC: 368 MG/DL — HIGH (ref 70–99)

## 2023-03-06 DIAGNOSIS — Z79.4 LONG TERM (CURRENT) USE OF INSULIN: ICD-10-CM

## 2023-03-06 DIAGNOSIS — R11.2 NAUSEA WITH VOMITING, UNSPECIFIED: ICD-10-CM

## 2023-03-06 DIAGNOSIS — E10.10 TYPE 1 DIABETES MELLITUS WITH KETOACIDOSIS WITHOUT COMA: ICD-10-CM

## 2023-03-06 DIAGNOSIS — A04.72 ENTEROCOLITIS DUE TO CLOSTRIDIUM DIFFICILE, NOT SPECIFIED AS RECURRENT: ICD-10-CM

## 2023-03-06 DIAGNOSIS — E78.00 PURE HYPERCHOLESTEROLEMIA, UNSPECIFIED: ICD-10-CM

## 2023-07-10 ENCOUNTER — RX RENEWAL (OUTPATIENT)
Age: 28
End: 2023-07-10

## 2023-07-10 RX ORDER — LANCETS
EACH MISCELLANEOUS
Qty: 100 | Refills: 3 | Status: ACTIVE | COMMUNITY
Start: 2022-05-19 | End: 1900-01-01

## 2023-07-10 RX ORDER — GLUCAGON 3 MG/1
3 POWDER NASAL
Qty: 1 | Refills: 3 | Status: ACTIVE | COMMUNITY
Start: 2022-11-09 | End: 1900-01-01

## 2023-08-17 ENCOUNTER — RX RENEWAL (OUTPATIENT)
Age: 28
End: 2023-08-17

## 2023-08-17 RX ORDER — BLOOD SUGAR DIAGNOSTIC
STRIP MISCELLANEOUS EVERY 6 HOURS
Qty: 150 | Refills: 5 | Status: ACTIVE | COMMUNITY
Start: 2022-08-20 | End: 1900-01-01

## 2023-09-18 ENCOUNTER — NON-APPOINTMENT (OUTPATIENT)
Age: 28
End: 2023-09-18

## 2023-10-03 NOTE — ED PROVIDER NOTE - CARE PLAN
1 Principal Discharge DX:	DKA (diabetic ketoacidosis)   Azithromycin Counseling:  I discussed with the patient the risks of azithromycin including but not limited to GI upset, allergic reaction, drug rash, diarrhea, and yeast infections.

## 2024-01-02 ENCOUNTER — APPOINTMENT (OUTPATIENT)
Dept: ENDOCRINOLOGY | Facility: CLINIC | Age: 29
End: 2024-01-02
Payer: MEDICAID

## 2024-01-02 VITALS
DIASTOLIC BLOOD PRESSURE: 72 MMHG | HEIGHT: 69 IN | SYSTOLIC BLOOD PRESSURE: 118 MMHG | BODY MASS INDEX: 21.18 KG/M2 | HEART RATE: 78 BPM | OXYGEN SATURATION: 98 % | WEIGHT: 143 LBS

## 2024-01-02 PROCEDURE — 99213 OFFICE O/P EST LOW 20 MIN: CPT

## 2024-01-02 NOTE — PHYSICAL EXAM
[Alert] : alert [No Acute Distress] : no acute distress [No Proptosis] : no proptosis [No Lid Lag] : no lid lag [Thyroid Not Enlarged] : the thyroid was not enlarged [No Thyroid Nodules] : no palpable thyroid nodules [No Respiratory Distress] : no respiratory distress [No Murmurs] : no murmurs [Regular Rhythm] : with a regular rhythm [No Edema] : no peripheral edema [Not Tender] : non-tender [Soft] : abdomen soft [No Stigmata of Cushings Syndrome] : no stigmata of Cushings Syndrome [No Tremors] : no tremors [Oriented x3] : oriented to person, place, and time [No Accessory Muscle Use] : no accessory muscle use [Clear to Auscultation] : lungs were clear to auscultation bilaterally [Abdominal Striae] : no abdominal striae [Acanthosis Nigricans] : no acanthosis nigricans

## 2024-01-02 NOTE — HISTORY OF PRESENT ILLNESS
[Finger Stick] : Finger Stick: Yes [FreeTextEntry1] : Mr. CARA HUITRON  Is  a 28 year  old male  who presents for follow up  type 1 Dm      Diagnosis: at the age of 9  Current Regimen:  Tresiba 28 units at 2 pm , Admelog     ISF : 1:20 ,, I: C 1: 10 g  Previous regimens: had Omnipod insulin pump and DEXCOM but stopped because of insurance ( for 2 years now )  Compliance: good  SMBG/CGM :  checking 4-5 times/ day and usually am 200-300,having hypoglycemia no log  Hypoglycemia:  yes middle of the night  Polyuria/polydipsia : no  Weight change/BMI: same  Diet: breakfast , lunch ( biggest meal ) , dinner  Exercise:active all day  HBa1c trend:  5/2022 Ac1c > 9 % in DKA ...8.6%( 2/2023)   .prevention   Statin: was on lipitor then taken off as LDL was ok  ACE/ARB : no  Eye examination: not recently   Neuropathy: yes

## 2024-01-02 NOTE — REVIEW OF SYSTEMS
[Fatigue] : fatigue [Blurred Vision] : blurred vision [As Noted in HPI] : as noted in HPI [Pain/Numbness of Digits] : pain/numbness of digits [Recent Weight Gain (___ Lbs)] : no recent weight gain [Recent Weight Loss (___ Lbs)] : no recent weight loss [Visual Field Defect] : no visual field defect [Dysphagia] : no dysphagia [Neck Pain] : no neck pain [Dysphonia] : no dysphonia [Chest Pain] : no chest pain [Palpitations] : no palpitations [Lower Ext Edema] : no lower extremity edema [Shortness Of Breath] : no shortness of breath [SOB on Exertion] : no shortness of breath on exertion [Nausea] : no nausea [Constipation] : no constipation [Vomiting] : no vomiting [Diarrhea] : no diarrhea [Headaches] : no headaches [Dizziness] : no dizziness [Tremors] : no tremors [Cold Intolerance] : no cold intolerance [Heat Intolerance] : no heat intolerance

## 2024-01-02 NOTE — DATA REVIEWED
[FreeTextEntry1] : reviewed 5/2022 labs in [facility]  2/2023: A1c 8.6%   crea 0.7

## 2024-01-02 NOTE — ASSESSMENT
[Diabetes Foot Care] : diabetes foot care [Long Term Vascular Complications] : long term vascular complications of diabetes [Carbohydrate Consistent Diet] : carbohydrate consistent diet [Importance of Diet and Exercise] : importance of diet and exercise to improve glycemic control, achieve weight loss and improve cardiovascular health [Exercise/Effect on Glucose] : exercise/effect on glucose [Hypoglycemia Management] : hypoglycemia management [Self Monitoring of Blood Glucose] : self monitoring of blood glucose [Insulin Self-Administration] : insulin self-administration [Retinopathy Screening] : Patient was referred to ophthalmology for retinopathy screening [Weight Loss] : weight loss [FreeTextEntry1] : Mr. CARA HUITRON Is a 28 year old male who presents for follow up     # poorly controlled type 1 DM , hypoglycemia  Current Regimen: Tresiba 28units at 2pm, ( working shifts from 2 pm -11 pm ) Admelog ( 20/20/ 10-15 with dinner ) ISF : 1:20 , I: C 1: 10 g  Previous regimens: had Omnipod insulin pump and DEXCOM but stopped because of insurance ( for 2 years now ) SMBG/CGM : checking 4-5 times/ day no real pattern very erratic between hypo and hyperglycemia, will benefit from having CGM again - continue same with insulin until more data log or CGM available as no real pattern  - need ophthalmology/ podiatry  referral BP ok - need labs and follow up

## 2024-01-08 ENCOUNTER — APPOINTMENT (OUTPATIENT)
Dept: PODIATRY | Facility: CLINIC | Age: 29
End: 2024-01-08

## 2024-02-07 ENCOUNTER — INPATIENT (INPATIENT)
Facility: HOSPITAL | Age: 29
LOS: 4 days | Discharge: HOME | DRG: 812 | End: 2024-02-12
Attending: STUDENT IN AN ORGANIZED HEALTH CARE EDUCATION/TRAINING PROGRAM | Admitting: STUDENT IN AN ORGANIZED HEALTH CARE EDUCATION/TRAINING PROGRAM
Payer: MEDICAID

## 2024-02-07 VITALS
TEMPERATURE: 97 F | DIASTOLIC BLOOD PRESSURE: 84 MMHG | SYSTOLIC BLOOD PRESSURE: 128 MMHG | RESPIRATION RATE: 20 BRPM | OXYGEN SATURATION: 99 % | HEART RATE: 94 BPM

## 2024-02-07 DIAGNOSIS — Z79.4 LONG TERM (CURRENT) USE OF INSULIN: ICD-10-CM

## 2024-02-07 DIAGNOSIS — F90.9 ATTENTION-DEFICIT HYPERACTIVITY DISORDER, UNSPECIFIED TYPE: ICD-10-CM

## 2024-02-07 DIAGNOSIS — E10.65 TYPE 1 DIABETES MELLITUS WITH HYPERGLYCEMIA: ICD-10-CM

## 2024-02-07 DIAGNOSIS — F33.2 MAJOR DEPRESSIVE DISORDER, RECURRENT SEVERE WITHOUT PSYCHOTIC FEATURES: ICD-10-CM

## 2024-02-07 LAB
ALBUMIN SERPL ELPH-MCNC: 4.6 G/DL — SIGNIFICANT CHANGE UP (ref 3.5–5.2)
ALP SERPL-CCNC: 90 U/L — SIGNIFICANT CHANGE UP (ref 30–115)
ALT FLD-CCNC: 29 U/L — SIGNIFICANT CHANGE UP (ref 0–41)
ANION GAP SERPL CALC-SCNC: 13 MMOL/L — SIGNIFICANT CHANGE UP (ref 7–14)
AST SERPL-CCNC: 29 U/L — SIGNIFICANT CHANGE UP (ref 0–41)
BASOPHILS # BLD AUTO: 0.05 K/UL — SIGNIFICANT CHANGE UP (ref 0–0.2)
BASOPHILS NFR BLD AUTO: 0.7 % — SIGNIFICANT CHANGE UP (ref 0–1)
BILIRUB SERPL-MCNC: 0.6 MG/DL — SIGNIFICANT CHANGE UP (ref 0.2–1.2)
BUN SERPL-MCNC: 14 MG/DL — SIGNIFICANT CHANGE UP (ref 10–20)
CALCIUM SERPL-MCNC: 9.6 MG/DL — SIGNIFICANT CHANGE UP (ref 8.4–10.5)
CHLORIDE SERPL-SCNC: 96 MMOL/L — LOW (ref 98–110)
CO2 SERPL-SCNC: 23 MMOL/L — SIGNIFICANT CHANGE UP (ref 17–32)
CREAT SERPL-MCNC: 0.9 MG/DL — SIGNIFICANT CHANGE UP (ref 0.7–1.5)
EGFR: 119 ML/MIN/1.73M2 — SIGNIFICANT CHANGE UP
EOSINOPHIL # BLD AUTO: 0.08 K/UL — SIGNIFICANT CHANGE UP (ref 0–0.7)
EOSINOPHIL NFR BLD AUTO: 1.2 % — SIGNIFICANT CHANGE UP (ref 0–8)
ETHANOL SERPL-MCNC: <10 MG/DL — SIGNIFICANT CHANGE UP
GLUCOSE SERPL-MCNC: 400 MG/DL — HIGH (ref 70–99)
HCT VFR BLD CALC: 45.7 % — SIGNIFICANT CHANGE UP (ref 42–52)
HGB BLD-MCNC: 15.7 G/DL — SIGNIFICANT CHANGE UP (ref 14–18)
IMM GRANULOCYTES NFR BLD AUTO: 0.3 % — SIGNIFICANT CHANGE UP (ref 0.1–0.3)
LYMPHOCYTES # BLD AUTO: 1.63 K/UL — SIGNIFICANT CHANGE UP (ref 1.2–3.4)
LYMPHOCYTES # BLD AUTO: 23.9 % — SIGNIFICANT CHANGE UP (ref 20.5–51.1)
MAGNESIUM SERPL-MCNC: 2 MG/DL — SIGNIFICANT CHANGE UP (ref 1.8–2.4)
MCHC RBC-ENTMCNC: 29.2 PG — SIGNIFICANT CHANGE UP (ref 27–31)
MCHC RBC-ENTMCNC: 34.4 G/DL — SIGNIFICANT CHANGE UP (ref 32–37)
MCV RBC AUTO: 85.1 FL — SIGNIFICANT CHANGE UP (ref 80–94)
MONOCYTES # BLD AUTO: 0.42 K/UL — SIGNIFICANT CHANGE UP (ref 0.1–0.6)
MONOCYTES NFR BLD AUTO: 6.2 % — SIGNIFICANT CHANGE UP (ref 1.7–9.3)
NEUTROPHILS # BLD AUTO: 4.62 K/UL — SIGNIFICANT CHANGE UP (ref 1.4–6.5)
NEUTROPHILS NFR BLD AUTO: 67.7 % — SIGNIFICANT CHANGE UP (ref 42.2–75.2)
NRBC # BLD: 0 /100 WBCS — SIGNIFICANT CHANGE UP (ref 0–0)
PHOSPHATE SERPL-MCNC: 4.3 MG/DL — SIGNIFICANT CHANGE UP (ref 2.1–4.9)
PLATELET # BLD AUTO: 316 K/UL — SIGNIFICANT CHANGE UP (ref 130–400)
PMV BLD: 9.3 FL — SIGNIFICANT CHANGE UP (ref 7.4–10.4)
POTASSIUM SERPL-MCNC: 5.1 MMOL/L — HIGH (ref 3.5–5)
POTASSIUM SERPL-SCNC: 5.1 MMOL/L — HIGH (ref 3.5–5)
PROT SERPL-MCNC: 7.1 G/DL — SIGNIFICANT CHANGE UP (ref 6–8)
RBC # BLD: 5.37 M/UL — SIGNIFICANT CHANGE UP (ref 4.7–6.1)
RBC # FLD: 11.2 % — LOW (ref 11.5–14.5)
SARS-COV-2 RNA SPEC QL NAA+PROBE: SIGNIFICANT CHANGE UP
SODIUM SERPL-SCNC: 132 MMOL/L — LOW (ref 135–146)
WBC # BLD: 6.82 K/UL — SIGNIFICANT CHANGE UP (ref 4.8–10.8)
WBC # FLD AUTO: 6.82 K/UL — SIGNIFICANT CHANGE UP (ref 4.8–10.8)

## 2024-02-07 PROCEDURE — 99285 EMERGENCY DEPT VISIT HI MDM: CPT

## 2024-02-07 PROCEDURE — 93010 ELECTROCARDIOGRAM REPORT: CPT

## 2024-02-07 NOTE — ED PROVIDER NOTE - OBJECTIVE STATEMENT
28 years old male history of depression, type 1 diabetes presenting complaint of intentional overdose.  As per patient, he has been stressed out and depressed over the past week.  Did not eat or drink a lot over the past week.  Also not using his insulin as instructed.  Report he got very stressed and upset today so he wanted to kill himself by taking extra for Tresiba.  He took just 60 units of Tresiba earlier this evening around 8 PM.  Told his family and his stepfather brought him to ED for evaluation.  Patient otherwise denies history of suicide attempt and psychiatric admission.  Denies any medical complaints now.

## 2024-02-07 NOTE — H&P ADULT - ASSESSMENT
IMPRESSION:  # Medication OD  # Diabetes mellitus I  # HLD    PLAN:    CNS: Avoid CNS Depressants     HEENT: Oral care. Aspiration precautions     PULMONARY: HOB @ 45. Monitor Pulse Ox. Keep > 93%. Supplement as needed.    CARDIOVASCULAR:   - Daily Weight. Strict I&Os. Keep I < O    GI: GI prophylaxis. DASH/TLC, carb consistent diet    RENAL: Avoid nephrotoxic agents     INFECTIOUS DISEASE:     HEMATOLOGICAL: DVT prophylaxis (Lovenox/heparin).     ENDOCRINE: Monitor FS q1 hr  - start on home meds  - Monitor BMP    MUSCULOSKELETAL: Ambulate as tolerated     CODE STATUS: Full code    DISPOSITION: MICU   IMPRESSION:  # Medication OD  # Diabetes mellitus I  # HLD    PLAN:    CNS: Avoid CNS Depressants   - Supportive care and monitor for above toxicities until at least 24 hours post-exposure      HEENT: Oral care. Aspiration precautions     PULMONARY: HOB @ 45. Monitor Pulse Ox. Keep > 93%. Supplement as needed.    CARDIOVASCULAR:   - Daily Weight. Strict I&Os. Keep I < O    GI: GI prophylaxis. DASH/TLC, carb consistent diet    RENAL: Avoid nephrotoxic agents     INFECTIOUS DISEASE:     HEMATOLOGICAL: DVT prophylaxis (Lovenox/heparin).     ENDOCRINE: Monitor FS q1 hr  - For the 12 hours, check FS 2 hours, and then every 4 hours  - If hypoglycemic develops, give D50 bolus followed by D10 infusion.  - start on home meds  - Monitor BMP    MUSCULOSKELETAL: Ambulate as tolerated     CODE STATUS: Full code    DISPOSITION: MICU   IMPRESSION:  # Medication OD  # Diabetes mellitus I on Insulin  # HLD?    PLAN:    CNS: Avoid CNS Depressants   - Supportive care and monitor for at least 24 hours post-exposure      HEENT: Oral care. Aspiration precautions     PULMONARY: HOB @ 45. Monitor Pulse Ox. Keep > 93%. Supplement as needed.    CARDIOVASCULAR:   - Daily Weight. Strict I&Os. Keep I < O    GI: GI prophylaxis, carb consistent diet    RENAL: Avoid nephrotoxic agents     INFECTIOUS DISEASE: monitor fever    HEMATOLOGICAL: Lovenox 40 mg    ENDOCRINE:  - For the 12 hours, check FS 2 hours, and then every 4 hours  - If hypoglycemic develops, give D50 bolus followed by D10 infusion.  - start on home meds  - Monitor BMP    MUSCULOSKELETAL: Ambulate as tolerated     CODE STATUS: Full code    DISPOSITION: MICU   IMPRESSION:  # Medication OD  # Diabetes mellitus I on Insulin  # HLD?    PLAN:    CNS: Avoid CNS Depressants   - Supportive care and monitor for at least 24 hours post-exposure, Serum and urine drug screen      HEENT: Oral care. Aspiration precautions     PULMONARY: HOB @ 45. Monitor Pulse Ox. Keep > 93%. Supplement as needed.    CARDIOVASCULAR:   - Daily Weight. Strict I&Os. Keep I < O    GI: GI prophylaxis not needed, carb consistent diet    RENAL: Avoid nephrotoxic agents     INFECTIOUS DISEASE: monitor fever    HEMATOLOGICAL: Lovenox 40 mg    ENDOCRINE:  - For the 12 hours, check FS 2 hours, and then every 4 hours  - If hypoglycemic develops, give D50 bolus followed by D10 infusion.  - Monitor BMP    MUSCULOSKELETAL: Ambulate as tolerated     CODE STATUS: Full code    DISPOSITION: MICU   IMPRESSION:  # Medication OD  # Diabetes mellitus I on Insulin  # HLD?    PLAN:    CNS: Avoid CNS Depressants   - Supportive care and monitor for at least 24 hours post-exposure, Serum and urine drug screen  - Psych cs    HEENT: Oral care. Aspiration precautions     PULMONARY: HOB @ 45. Monitor Pulse Ox. Keep > 93%. Supplement as needed.    CARDIOVASCULAR:   - Daily Weight. Strict I&Os. Keep I < O    GI: GI prophylaxis not needed, carb consistent diet    RENAL: Avoid nephrotoxic agents     INFECTIOUS DISEASE: monitor fever    HEMATOLOGICAL: Lovenox 40 mg    ENDOCRINE:  - For the 12 hours, check FS 2 hours, and then every 4 hours  - If hypoglycemic develops, give D50 bolus followed by D10 infusion.  - Monitor BMP    MUSCULOSKELETAL: Ambulate as tolerated     CODE STATUS: Full code    DISPOSITION: MICU   IMPRESSION:    # Medication OD  # Diabetes mellitus  on Insulin  # HLD?    PLAN:    CNS: Avoid CNS Depressants   - Supportive care and monitor for at least 24 hours post-exposure, Serum and urine drug screen  - Psych cs    HEENT: Oral care. Aspiration precautions     PULMONARY: HOB @ 45. Monitor Pulse Ox. Keep > 93%. Supplement as needed.    CARDIOVASCULAR:   - Daily Weight. Strict I&Os. Keep I < O    GI: GI prophylaxis not needed, carb consistent diet    RENAL: Avoid nephrotoxic agents     INFECTIOUS DISEASE: monitor fever    HEMATOLOGICAL: Lovenox 40 mg    ENDOCRINE:  - For the 12 hours, check FS 2 hours, and then every 4 hours  - If hypoglycemic develops, give D50 bolus followed by D10 infusion.  - Monitor BMP    MUSCULOSKELETAL: Ambulate as tolerated     CODE STATUS: Full code    DISPOSITION: MICU

## 2024-02-07 NOTE — H&P ADULT - ATTENDING COMMENTS
events noted, sp tresiba overdose, no hypoglycemia tox noted, depression, Psy eval, fuup FS, 1:1 SIT

## 2024-02-07 NOTE — ED ADULT NURSE NOTE - NSFALLUNIVINTERV_ED_ALL_ED
Bed/Stretcher in lowest position, wheels locked, appropriate side rails in place/Call bell, personal items and telephone in reach/Instruct patient to call for assistance before getting out of bed/chair/stretcher/Non-slip footwear applied when patient is off stretcher/[place] to call system/Physically safe environment - no spills, clutter or unnecessary equipment/Purposeful proactive rounding/Room/bathroom lighting operational, light cord in reach

## 2024-02-07 NOTE — ED PROVIDER NOTE - PHYSICAL EXAMINATION
CONSTITUTIONAL: Well-appearing; in no apparent distress.   EYES: PERRL; EOM intact.   CARDIOVASCULAR: Normal S1, S2; no murmurs, rubs, or gallops.   RESPIRATORY: Normal chest excursion with respiration; breath sounds clear and equal bilaterally; no wheezes, rhonchi, or rales.  GI/: Normal bowel sounds; non-distended; non-tender; no palpable organomegaly.   MS: No calf swelling and tenderness.  SKIN: Normal for age and race; warm; dry; good turgor; no apparent lesions or exudate.   NEURO/PSYCH: A & O x 4; grossly unremarkable.  Depressed mood.  Speaking coherently.  Denies A/V hallucinations

## 2024-02-07 NOTE — ED PROVIDER NOTE - ATTENDING APP SHARED VISIT CONTRIBUTION OF CARE
28-year-old male past med history of type 1 diabetes presents after suicide attempt.  Patient reports taking 160 units of Tresiba around 5 PM.  Patient states for the last few days has been having increased stress in his life.  Does not want to detail at this time.  States that it got overwhelming and he took the insulin at that time.  No thoughts of hurting herself at this time.  No homicidal ideations.  No similar episodes in the past.  Follows with a therapist.  Not currently on any psychiatric medications.  Patient placed on one-to-one on arrival.    CONSTITUTIONAL: Well-developed; well-nourished; in no acute distress.   SKIN: warm, dry  HEAD: Normocephalic; atraumatic.  EYES: PERRL, EOMI, no conjunctival erythema  ENT: No nasal discharge; airway clear.  NECK: Supple; non tender.  CARD: S1, S2 normal;  Regular rate and rhythm.   RESP: No wheezes, rales or rhonchi.  ABD: soft non tender, non distended, no rebound or guarding  EXT: Normal ROM.  5/5 strength in all 4 extremities   LYMPH: No acute cervical adenopathy.  NEURO: Alert, oriented, grossly unremarkable. neurovascularly intact  PSYCH: Cooperative, appropriate.

## 2024-02-07 NOTE — H&P ADULT - HISTORY OF PRESENT ILLNESS
28 years old M with PMHx of DM on home insulin overdosed on Insulin 160 U    In the ED pts VS were T(C): 36.3  T(F): 97.3  HR: 94  BP: 128/84  RR: 20  SpO2: 99%  Labs show: Hg:   WBC:  Plt:  Creatinine:  CXR shows  CT scans shows      Pt is admitted for further workup and management   28 years old M with pmhx of t1dm and mdd, who presents after intentional OD Insulin degludec 160 units around 8 pm SQ. His normal dose 30 units. He reports not using his short acting insulin for several days and weeks due to depression. No other reported overdose, and patient regrets it at this time. He has normal vitals and exam. Labs show  > 323 > 375. He has eaten 2 large meals at this time.     In the ED pts VS were T(C): 36.3  T(F): 97.3  HR: 94  BP: 128/84  RR: 20  SpO2: 99%  Labs show: Hg:   WBC:  Plt:  Creatinine:  CXR shows  CT scans shows      Pt is admitted for further workup and management   28 years old M with pmhx of type 1 dm and MDD who presents after intentional OD Insulin degludec 160 units around 8 pm SQ. His normal dose 30 units. No other reported overdose. He has eaten 2 large meals at this time.  Pt states he usually takes his insulin regularly but he was feeling very depressed for the past couple of days    In the ED pts VS were T(C): 36.3  T(F): 97.3  HR: 94  BP: 128/84  RR: 20  SpO2: 99%  Labs show: Hg:   WBC:  Plt:  Creatinine:  CXR shows  CT scans shows      Pt is admitted for further workup and management   28 years old M with pmhx of type 1 DM and MDD who presents after intentional OD Insulin degludec 160 units around 8 pm SQ. His normal dose is 30 units daily. No other reported overdose. Pt states he has had 2 large meals at today and he usually takes his insulin regularly but he was feeling very depressed for the past couple of days de to some personal problems.      In the ED pts VS were T(C): 36.3  T(F): 97.3  HR: 94  BP: 128/84  RR: 20  SpO2: 99%      Pt is admitted for further workup and management of Overdose

## 2024-02-07 NOTE — ED PROVIDER NOTE - CLINICAL SUMMARY MEDICAL DECISION MAKING FREE TEXT BOX
Patient presents after a suicide attempt. Took 160units of insulin long acting. labs, ekg done. Placed on 1:1 on arrival. q1h FS stable. Discussed with tox who recommends admission. Patient admitted to ICU for further management. Labs and EKG were ordered and reviewed.  Imaging was ordered and reviewed by me.  Appropriate medications for patient's presenting complaints were ordered and effects were reassessed.  Patient's records (prior hospital, ED visit, and/or nursing home notes if available) were reviewed.  Additional history was obtained from EMS, family, and/or PCP (where available).  Escalation to admission/observation was considered.  Patient requires inpatient hospitalization - monitored setting. ED EKG: my independent interpretation - Dr. Watkins: [NSR at 94, nl axis, nl intervals, no ST elevation]

## 2024-02-07 NOTE — H&P ADULT - NSHPLABSRESULTS_GEN_ALL_CORE
15.7   6.82  )-----------( 316      ( 07 Feb 2024 21:42 )             45.7       02-07    132<L>  |  96<L>  |  14  ----------------------------<  400<H>  5.1<H>   |  23  |  0.9    Ca    9.6      07 Feb 2024 21:42  Phos  4.3     02-07  Mg     2.0     02-07    TPro  7.1  /  Alb  4.6  /  TBili  0.6  /  DBili  x   /  AST  29  /  ALT  29  /  AlkPhos  90  02-07              Urinalysis Basic - ( 07 Feb 2024 21:42 )    Color: x / Appearance: x / SG: x / pH: x  Gluc: 400 mg/dL / Ketone: x  / Bili: x / Urobili: x   Blood: x / Protein: x / Nitrite: x   Leuk Esterase: x / RBC: x / WBC x   Sq Epi: x / Non Sq Epi: x / Bacteria: x            Lactate Trend            CAPILLARY BLOOD GLUCOSE      POCT Blood Glucose.: 419 mg/dL (07 Feb 2024 23:31)

## 2024-02-07 NOTE — CONSULT NOTE ADULT - ASSESSMENT
Radha Spivey is a 29 yo M, hx of t1dm and mdd, who presents after  intentional OD Insulin degludec 160 units around 8 pm SQ. His normal dose 30 units. He reports not using his short acting insulin for several days and weeks due to depression. No other reported overdose, and patient regrets it at this time. He has normal vitals and exam. Labs show  > 323 > 375. He has eaten 2 large meals at this time.     Insulin promotes glucose uptake into tissue, and can cause hypoglycemia. Insulin degludec is a long-acting insulin with a pharmacokinetic Tmax SQ of 9 hours, and half life around 25 hours. Case reports have shown that in overdose, the Tmax and half life can be much longer. Case reports have shown patients may need IV dextrose supplementation for several days.     #Recommendations  - Supportive care and monitor for above toxicities until at least 24 hours post-exposure  - Encourage oral ingestion of complex carbohydrates  - For the 12 hours, please check POC dextrose every 2 hours, and then every 4 hours  - If hypoglycemic develops, give D50 bolus followed by D10 infusion. Adjust to euglycemia.   - If asymptomatic with normal vitals signs and no hypoglycemia at end of observation period without need for IV dextrose, the patient is cleared from acute toxicological standpoint  - Further medical and/or psychiatric care per primary team    Thank you for involving us in the care of this patient. Please do not hesitate to reach out to the toxicology team for any further questions or concerns.    The On-Call Toxicology Fellow can be reached 24/7 via Pager #[phone]  Please send a 10 digit call back # as [facility] multiple hospitals    Carlos Walker MD  Toxicology Fellow  PGY-5        Thank you for involving us in the care of this patient. Assessment and plan discussed with toxicology attending  ***. Please do not hesitate to reach out to the toxicology team for any further questions or concerns.    The On-Call Toxicology Fellow can be reached 24/7 via Pager #[phone]  Please send a 10 digit call back # as [facility] cover multiple hospitals    Carlos Walker MD  Toxicology Fellow  PGY-5

## 2024-02-07 NOTE — CONSULT NOTE ADULT - SUBJECTIVE AND OBJECTIVE BOX
MEDICAL TOXICOLOGY CONSULT    HPI:   Ruy Spivey is a 29 yo M, hx of t1dm and mdd, who presents after  intentional OD Insulin degludec 160 units around 8 pm SQ. His normal dose 30 units. He reports not using his short acting insulin for several days and weeks due to depression. No other reported overdose, and patient regrets it at this time.     ONSET / TIME of exposure(s): 8 pm    QUANTITY of exposure(s): 160 units    ROUTE of exposure:  Subcutaneous    CONTEXT of exposure: at home    ASSOCIATED symptoms: none    PAST MEDICAL & SURGICAL HISTORY:  Type 1 diabetes mellitus  Diagnosed in 2005, managed by Dr. Johnston      Hypercholesterolemia  currently on Lipitor          MEDICATION HISTORY: novolog      FAMILY HISTORY: n/a      REVIEW OF SYSTEMS:  All systems negative except per HPI.        Vital Signs Last 24 Hrs  T(C): 36.3 (07 Feb 2024 19:23), Max: 36.3 (07 Feb 2024 19:23)  T(F): 97.3 (07 Feb 2024 19:23), Max: 97.3 (07 Feb 2024 19:23)  HR: 94 (07 Feb 2024 19:23) (94 - 94)  BP: 128/84 (07 Feb 2024 19:23) (128/84 - 128/84)  BP(mean): --  RR: 20 (07 Feb 2024 19:23) (20 - 20)  SpO2: 99% (07 Feb 2024 19:23) (99% - 99%)    Parameters below as of 07 Feb 2024 19:23  Patient On (Oxygen Delivery Method): room air        SIGNIFICANT LABORATORY STUDIES:                        15.7   6.82  )-----------( 316      ( 07 Feb 2024 21:42 )             45.7       02-07    132<L>  |  96<L>  |  14  ----------------------------<  400<H>  5.1<H>   |  23  |  0.9    Ca    9.6      07 Feb 2024 21:42  Phos  4.3     02-07  Mg     2.0     02-07    TPro  7.1  /  Alb  4.6  /  TBili  0.6  /  DBili  x   /  AST  29  /  ALT  29  /  AlkPhos  90  02-07          Urinalysis Basic - ( 07 Feb 2024 21:42 )    Color: x / Appearance: x / SG: x / pH: x  Gluc: 400 mg/dL / Ketone: x  / Bili: x / Urobili: x   Blood: x / Protein: x / Nitrite: x   Leuk Esterase: x / RBC: x / WBC x   Sq Epi: x / Non Sq Epi: x / Bacteria: x        Anion Gap: 13 02-07 @ 21:42  CK: -- 02-07 @ 21:42  Troponin:  --  02-07 @ 21:42  Pro-BNP:  --  02-07 @ 21:42  VBG:  --  02-07 @ 21:42  Carboxyhemoglobin %:  --  02-07 @ 21:42  Methemoglobin %:  --  02-07 @ 21:42  Osmolality Serum:  --  02-07 @ 21:42  Aspirin Level: --  02-07 @ 21:42  Acetaminophen Level:  --  02-07 @ 21:42  Ethanol Level:  --  02-07 @ 21:42  Digoxin Level:  --  02-07 @ 21:42  Phenytoin Level:  --  02-07 @ 21:42  Carbamazepine level:  --  02-07 @ 21:42  Lamotrigine level:  --  02-07 @ 21:42

## 2024-02-07 NOTE — ED PROVIDER NOTE - PROGRESS NOTE DETAILS
spoke with Tox Dr Walker, patient will need to be monitored for at least 24 hours for hypoglycemia and sometimes longer. Q2 hours FS for the 1st 12 hours.  Spoke with ICU fellow Dr Burks and approve for ICU.

## 2024-02-07 NOTE — ED PROVIDER NOTE - CARE PLAN
1 Principal Discharge DX:	Intentional overdose of insulin  Secondary Diagnosis:	Suicidal behavior with attempted self-injury

## 2024-02-07 NOTE — ED ADULT NURSE NOTE - OBJECTIVE STATEMENT
28 yr old male complaining of suicide attempt. Pt states he has had s mental breakdown and "needed a break" 1:1 initiated in triage

## 2024-02-07 NOTE — ED ADULT TRIAGE NOTE - CHIEF COMPLAINT QUOTE
pt. took over 160 units of tresiba . pt. admits to trying to kill himself. pt is a type 1 diabetic. 1:1 initiated in triage

## 2024-02-08 DIAGNOSIS — F41.9 ANXIETY DISORDER, UNSPECIFIED: ICD-10-CM

## 2024-02-08 DIAGNOSIS — F90.9 ATTENTION-DEFICIT HYPERACTIVITY DISORDER, UNSPECIFIED TYPE: ICD-10-CM

## 2024-02-08 DIAGNOSIS — T38.3X2A POISONING BY INSULIN AND ORAL HYPOGLYCEMIC [ANTIDIABETIC] DRUGS, INTENTIONAL SELF-HARM, INITIAL ENCOUNTER: ICD-10-CM

## 2024-02-08 LAB
A1C WITH ESTIMATED AVERAGE GLUCOSE RESULT: 10.8 % — HIGH (ref 4–5.6)
ALBUMIN SERPL ELPH-MCNC: 4.1 G/DL — SIGNIFICANT CHANGE UP (ref 3.5–5.2)
ALBUMIN SERPL ELPH-MCNC: 4.2 G/DL — SIGNIFICANT CHANGE UP (ref 3.5–5.2)
ALP SERPL-CCNC: 79 U/L — SIGNIFICANT CHANGE UP (ref 30–115)
ALP SERPL-CCNC: 85 U/L — SIGNIFICANT CHANGE UP (ref 30–115)
ALT FLD-CCNC: 23 U/L — SIGNIFICANT CHANGE UP (ref 0–41)
ALT FLD-CCNC: 24 U/L — SIGNIFICANT CHANGE UP (ref 0–41)
ANION GAP SERPL CALC-SCNC: 10 MMOL/L — SIGNIFICANT CHANGE UP (ref 7–14)
ANION GAP SERPL CALC-SCNC: 16 MMOL/L — HIGH (ref 7–14)
APAP SERPL-MCNC: <5 UG/ML — LOW (ref 10–30)
AST SERPL-CCNC: 20 U/L — SIGNIFICANT CHANGE UP (ref 0–41)
AST SERPL-CCNC: 21 U/L — SIGNIFICANT CHANGE UP (ref 0–41)
BASOPHILS # BLD AUTO: 0.06 K/UL — SIGNIFICANT CHANGE UP (ref 0–0.2)
BASOPHILS # BLD AUTO: 0.06 K/UL — SIGNIFICANT CHANGE UP (ref 0–0.2)
BASOPHILS NFR BLD AUTO: 1.2 % — HIGH (ref 0–1)
BASOPHILS NFR BLD AUTO: 1.3 % — HIGH (ref 0–1)
BILIRUB SERPL-MCNC: 0.4 MG/DL — SIGNIFICANT CHANGE UP (ref 0.2–1.2)
BILIRUB SERPL-MCNC: 0.4 MG/DL — SIGNIFICANT CHANGE UP (ref 0.2–1.2)
BUN SERPL-MCNC: 15 MG/DL — SIGNIFICANT CHANGE UP (ref 10–20)
BUN SERPL-MCNC: 17 MG/DL — SIGNIFICANT CHANGE UP (ref 10–20)
CALCIUM SERPL-MCNC: 9.1 MG/DL — SIGNIFICANT CHANGE UP (ref 8.4–10.5)
CALCIUM SERPL-MCNC: 9.3 MG/DL — SIGNIFICANT CHANGE UP (ref 8.4–10.5)
CHLORIDE SERPL-SCNC: 94 MMOL/L — LOW (ref 98–110)
CHLORIDE SERPL-SCNC: 98 MMOL/L — SIGNIFICANT CHANGE UP (ref 98–110)
CHOLEST SERPL-MCNC: 191 MG/DL — SIGNIFICANT CHANGE UP
CO2 SERPL-SCNC: 21 MMOL/L — SIGNIFICANT CHANGE UP (ref 17–32)
CO2 SERPL-SCNC: 25 MMOL/L — SIGNIFICANT CHANGE UP (ref 17–32)
CREAT SERPL-MCNC: 0.7 MG/DL — SIGNIFICANT CHANGE UP (ref 0.7–1.5)
CREAT SERPL-MCNC: 0.7 MG/DL — SIGNIFICANT CHANGE UP (ref 0.7–1.5)
EGFR: 129 ML/MIN/1.73M2 — SIGNIFICANT CHANGE UP
EGFR: 129 ML/MIN/1.73M2 — SIGNIFICANT CHANGE UP
EOSINOPHIL # BLD AUTO: 0.12 K/UL — SIGNIFICANT CHANGE UP (ref 0–0.7)
EOSINOPHIL # BLD AUTO: 0.16 K/UL — SIGNIFICANT CHANGE UP (ref 0–0.7)
EOSINOPHIL NFR BLD AUTO: 2.5 % — SIGNIFICANT CHANGE UP (ref 0–8)
EOSINOPHIL NFR BLD AUTO: 3.5 % — SIGNIFICANT CHANGE UP (ref 0–8)
ESTIMATED AVERAGE GLUCOSE: 263 MG/DL — HIGH (ref 68–114)
GLUCOSE BLDC GLUCOMTR-MCNC: 246 MG/DL — HIGH (ref 70–99)
GLUCOSE BLDC GLUCOMTR-MCNC: 265 MG/DL — HIGH (ref 70–99)
GLUCOSE BLDC GLUCOMTR-MCNC: 303 MG/DL — HIGH (ref 70–99)
GLUCOSE BLDC GLUCOMTR-MCNC: 339 MG/DL — HIGH (ref 70–99)
GLUCOSE BLDC GLUCOMTR-MCNC: 346 MG/DL — HIGH (ref 70–99)
GLUCOSE BLDC GLUCOMTR-MCNC: 359 MG/DL — HIGH (ref 70–99)
GLUCOSE BLDC GLUCOMTR-MCNC: 393 MG/DL — HIGH (ref 70–99)
GLUCOSE BLDC GLUCOMTR-MCNC: 407 MG/DL — HIGH (ref 70–99)
GLUCOSE BLDC GLUCOMTR-MCNC: 422 MG/DL — HIGH (ref 70–99)
GLUCOSE BLDC GLUCOMTR-MCNC: 447 MG/DL — HIGH (ref 70–99)
GLUCOSE SERPL-MCNC: 294 MG/DL — HIGH (ref 70–99)
GLUCOSE SERPL-MCNC: 465 MG/DL — CRITICAL HIGH (ref 70–99)
HCT VFR BLD CALC: 42.4 % — SIGNIFICANT CHANGE UP (ref 42–52)
HCT VFR BLD CALC: 42.9 % — SIGNIFICANT CHANGE UP (ref 42–52)
HDLC SERPL-MCNC: 49 MG/DL — SIGNIFICANT CHANGE UP
HGB BLD-MCNC: 14.5 G/DL — SIGNIFICANT CHANGE UP (ref 14–18)
HGB BLD-MCNC: 14.6 G/DL — SIGNIFICANT CHANGE UP (ref 14–18)
IMM GRANULOCYTES NFR BLD AUTO: 0.2 % — SIGNIFICANT CHANGE UP (ref 0.1–0.3)
IMM GRANULOCYTES NFR BLD AUTO: 0.2 % — SIGNIFICANT CHANGE UP (ref 0.1–0.3)
LIPID PNL WITH DIRECT LDL SERPL: 91 MG/DL — SIGNIFICANT CHANGE UP
LYMPHOCYTES # BLD AUTO: 1.02 K/UL — LOW (ref 1.2–3.4)
LYMPHOCYTES # BLD AUTO: 1.64 K/UL — SIGNIFICANT CHANGE UP (ref 1.2–3.4)
LYMPHOCYTES # BLD AUTO: 20.9 % — SIGNIFICANT CHANGE UP (ref 20.5–51.1)
LYMPHOCYTES # BLD AUTO: 36.4 % — SIGNIFICANT CHANGE UP (ref 20.5–51.1)
MAGNESIUM SERPL-MCNC: 1.9 MG/DL — SIGNIFICANT CHANGE UP (ref 1.8–2.4)
MCHC RBC-ENTMCNC: 29.4 PG — SIGNIFICANT CHANGE UP (ref 27–31)
MCHC RBC-ENTMCNC: 29.4 PG — SIGNIFICANT CHANGE UP (ref 27–31)
MCHC RBC-ENTMCNC: 34 G/DL — SIGNIFICANT CHANGE UP (ref 32–37)
MCHC RBC-ENTMCNC: 34.2 G/DL — SIGNIFICANT CHANGE UP (ref 32–37)
MCV RBC AUTO: 86 FL — SIGNIFICANT CHANGE UP (ref 80–94)
MCV RBC AUTO: 86.5 FL — SIGNIFICANT CHANGE UP (ref 80–94)
MONOCYTES # BLD AUTO: 0.34 K/UL — SIGNIFICANT CHANGE UP (ref 0.1–0.6)
MONOCYTES # BLD AUTO: 0.36 K/UL — SIGNIFICANT CHANGE UP (ref 0.1–0.6)
MONOCYTES NFR BLD AUTO: 7 % — SIGNIFICANT CHANGE UP (ref 1.7–9.3)
MONOCYTES NFR BLD AUTO: 8 % — SIGNIFICANT CHANGE UP (ref 1.7–9.3)
NEUTROPHILS # BLD AUTO: 2.28 K/UL — SIGNIFICANT CHANGE UP (ref 1.4–6.5)
NEUTROPHILS # BLD AUTO: 3.33 K/UL — SIGNIFICANT CHANGE UP (ref 1.4–6.5)
NEUTROPHILS NFR BLD AUTO: 50.6 % — SIGNIFICANT CHANGE UP (ref 42.2–75.2)
NEUTROPHILS NFR BLD AUTO: 68.2 % — SIGNIFICANT CHANGE UP (ref 42.2–75.2)
NON HDL CHOLESTEROL: 142 MG/DL — HIGH
NRBC # BLD: 0 /100 WBCS — SIGNIFICANT CHANGE UP (ref 0–0)
NRBC # BLD: 0 /100 WBCS — SIGNIFICANT CHANGE UP (ref 0–0)
PLATELET # BLD AUTO: 271 K/UL — SIGNIFICANT CHANGE UP (ref 130–400)
PLATELET # BLD AUTO: 309 K/UL — SIGNIFICANT CHANGE UP (ref 130–400)
PMV BLD: 9.3 FL — SIGNIFICANT CHANGE UP (ref 7.4–10.4)
PMV BLD: 9.6 FL — SIGNIFICANT CHANGE UP (ref 7.4–10.4)
POTASSIUM SERPL-MCNC: 4.5 MMOL/L — SIGNIFICANT CHANGE UP (ref 3.5–5)
POTASSIUM SERPL-MCNC: 5.2 MMOL/L — HIGH (ref 3.5–5)
POTASSIUM SERPL-SCNC: 4.5 MMOL/L — SIGNIFICANT CHANGE UP (ref 3.5–5)
POTASSIUM SERPL-SCNC: 5.2 MMOL/L — HIGH (ref 3.5–5)
PROT SERPL-MCNC: 6.3 G/DL — SIGNIFICANT CHANGE UP (ref 6–8)
PROT SERPL-MCNC: 6.6 G/DL — SIGNIFICANT CHANGE UP (ref 6–8)
RBC # BLD: 4.93 M/UL — SIGNIFICANT CHANGE UP (ref 4.7–6.1)
RBC # BLD: 4.96 M/UL — SIGNIFICANT CHANGE UP (ref 4.7–6.1)
RBC # FLD: 11.3 % — LOW (ref 11.5–14.5)
RBC # FLD: 11.5 % — SIGNIFICANT CHANGE UP (ref 11.5–14.5)
SALICYLATES SERPL-MCNC: <0.3 MG/DL — LOW (ref 4–30)
SODIUM SERPL-SCNC: 131 MMOL/L — LOW (ref 135–146)
SODIUM SERPL-SCNC: 133 MMOL/L — LOW (ref 135–146)
TRIGL SERPL-MCNC: 252 MG/DL — HIGH
WBC # BLD: 4.51 K/UL — LOW (ref 4.8–10.8)
WBC # BLD: 4.88 K/UL — SIGNIFICANT CHANGE UP (ref 4.8–10.8)
WBC # FLD AUTO: 4.51 K/UL — LOW (ref 4.8–10.8)
WBC # FLD AUTO: 4.88 K/UL — SIGNIFICANT CHANGE UP (ref 4.8–10.8)

## 2024-02-08 PROCEDURE — 82962 GLUCOSE BLOOD TEST: CPT

## 2024-02-08 PROCEDURE — 80048 BASIC METABOLIC PNL TOTAL CA: CPT

## 2024-02-08 PROCEDURE — 36415 COLL VENOUS BLD VENIPUNCTURE: CPT

## 2024-02-08 PROCEDURE — 87635 SARS-COV-2 COVID-19 AMP PRB: CPT

## 2024-02-08 PROCEDURE — 99223 1ST HOSP IP/OBS HIGH 75: CPT

## 2024-02-08 PROCEDURE — 85027 COMPLETE CBC AUTOMATED: CPT

## 2024-02-08 PROCEDURE — 83735 ASSAY OF MAGNESIUM: CPT

## 2024-02-08 PROCEDURE — 81003 URINALYSIS AUTO W/O SCOPE: CPT

## 2024-02-08 PROCEDURE — 80061 LIPID PANEL: CPT

## 2024-02-08 PROCEDURE — 80053 COMPREHEN METABOLIC PANEL: CPT

## 2024-02-08 PROCEDURE — 99221 1ST HOSP IP/OBS SF/LOW 40: CPT

## 2024-02-08 PROCEDURE — 80307 DRUG TEST PRSMV CHEM ANLYZR: CPT

## 2024-02-08 PROCEDURE — 80354 DRUG SCREENING FENTANYL: CPT

## 2024-02-08 PROCEDURE — 90792 PSYCH DIAG EVAL W/MED SRVCS: CPT | Mod: 95

## 2024-02-08 PROCEDURE — 83036 HEMOGLOBIN GLYCOSYLATED A1C: CPT

## 2024-02-08 PROCEDURE — 84100 ASSAY OF PHOSPHORUS: CPT

## 2024-02-08 PROCEDURE — 85025 COMPLETE CBC W/AUTO DIFF WBC: CPT

## 2024-02-08 PROCEDURE — 71045 X-RAY EXAM CHEST 1 VIEW: CPT

## 2024-02-08 PROCEDURE — 82010 KETONE BODYS QUAN: CPT

## 2024-02-08 RX ORDER — INSULIN LISPRO 100/ML
3 VIAL (ML) SUBCUTANEOUS ONCE
Refills: 0 | Status: COMPLETED | OUTPATIENT
Start: 2024-02-08 | End: 2024-02-08

## 2024-02-08 RX ORDER — INSULIN LISPRO 100/ML
5 VIAL (ML) SUBCUTANEOUS
Refills: 0 | Status: DISCONTINUED | OUTPATIENT
Start: 2024-02-08 | End: 2024-02-09

## 2024-02-08 RX ORDER — SODIUM CHLORIDE 9 MG/ML
1000 INJECTION, SOLUTION INTRAVENOUS
Refills: 0 | Status: DISCONTINUED | OUTPATIENT
Start: 2024-02-08 | End: 2024-02-12

## 2024-02-08 RX ORDER — CHLORHEXIDINE GLUCONATE 213 G/1000ML
1 SOLUTION TOPICAL
Refills: 0 | Status: DISCONTINUED | OUTPATIENT
Start: 2024-02-08 | End: 2024-02-12

## 2024-02-08 RX ORDER — DEXTROSE 50 % IN WATER 50 %
12.5 SYRINGE (ML) INTRAVENOUS ONCE
Refills: 0 | Status: DISCONTINUED | OUTPATIENT
Start: 2024-02-08 | End: 2024-02-12

## 2024-02-08 RX ORDER — DEXTROSE 50 % IN WATER 50 %
25 SYRINGE (ML) INTRAVENOUS ONCE
Refills: 0 | Status: DISCONTINUED | OUTPATIENT
Start: 2024-02-08 | End: 2024-02-12

## 2024-02-08 RX ORDER — INSULIN GLARGINE 100 [IU]/ML
26 INJECTION, SOLUTION SUBCUTANEOUS
Qty: 0 | Refills: 0 | DISCHARGE

## 2024-02-08 RX ORDER — INSULIN GLARGINE 100 [IU]/ML
20 INJECTION, SOLUTION SUBCUTANEOUS EVERY MORNING
Refills: 0 | Status: DISCONTINUED | OUTPATIENT
Start: 2024-02-08 | End: 2024-02-11

## 2024-02-08 RX ORDER — ENOXAPARIN SODIUM 100 MG/ML
40 INJECTION SUBCUTANEOUS EVERY 24 HOURS
Refills: 0 | Status: DISCONTINUED | OUTPATIENT
Start: 2024-02-08 | End: 2024-02-12

## 2024-02-08 RX ORDER — ACETAMINOPHEN 500 MG
650 TABLET ORAL EVERY 6 HOURS
Refills: 0 | Status: DISCONTINUED | OUTPATIENT
Start: 2024-02-08 | End: 2024-02-12

## 2024-02-08 RX ORDER — INSULIN GLARGINE 100 [IU]/ML
20 INJECTION, SOLUTION SUBCUTANEOUS EVERY MORNING
Refills: 0 | Status: DISCONTINUED | OUTPATIENT
Start: 2024-02-08 | End: 2024-02-08

## 2024-02-08 RX ORDER — ONDANSETRON 8 MG/1
4 TABLET, FILM COATED ORAL EVERY 8 HOURS
Refills: 0 | Status: DISCONTINUED | OUTPATIENT
Start: 2024-02-08 | End: 2024-02-12

## 2024-02-08 RX ORDER — DEXTROSE 50 % IN WATER 50 %
15 SYRINGE (ML) INTRAVENOUS ONCE
Refills: 0 | Status: DISCONTINUED | OUTPATIENT
Start: 2024-02-08 | End: 2024-02-12

## 2024-02-08 RX ORDER — LANOLIN ALCOHOL/MO/W.PET/CERES
3 CREAM (GRAM) TOPICAL AT BEDTIME
Refills: 0 | Status: DISCONTINUED | OUTPATIENT
Start: 2024-02-08 | End: 2024-02-12

## 2024-02-08 RX ORDER — GLUCAGON INJECTION, SOLUTION 0.5 MG/.1ML
1 INJECTION, SOLUTION SUBCUTANEOUS ONCE
Refills: 0 | Status: DISCONTINUED | OUTPATIENT
Start: 2024-02-08 | End: 2024-02-12

## 2024-02-08 RX ORDER — DULOXETINE HYDROCHLORIDE 30 MG/1
0 CAPSULE, DELAYED RELEASE ORAL
Qty: 0 | Refills: 0 | DISCHARGE

## 2024-02-08 RX ORDER — SODIUM CHLORIDE 9 MG/ML
1000 INJECTION, SOLUTION INTRAVENOUS
Refills: 0 | Status: DISCONTINUED | OUTPATIENT
Start: 2024-02-08 | End: 2024-02-09

## 2024-02-08 RX ORDER — INSULIN LISPRO 100/ML
VIAL (ML) SUBCUTANEOUS
Refills: 0 | Status: DISCONTINUED | OUTPATIENT
Start: 2024-02-08 | End: 2024-02-12

## 2024-02-08 RX ORDER — INFLUENZA VIRUS VACCINE 15; 15; 15; 15 UG/.5ML; UG/.5ML; UG/.5ML; UG/.5ML
0.5 SUSPENSION INTRAMUSCULAR ONCE
Refills: 0 | Status: DISCONTINUED | OUTPATIENT
Start: 2024-02-08 | End: 2024-02-12

## 2024-02-08 RX ADMIN — INSULIN GLARGINE 20 UNIT(S): 100 INJECTION, SOLUTION SUBCUTANEOUS at 16:27

## 2024-02-08 RX ADMIN — Medication 3 UNIT(S): at 05:36

## 2024-02-08 RX ADMIN — Medication 20 MILLIGRAM(S): at 16:33

## 2024-02-08 RX ADMIN — Medication 5 UNIT(S): at 15:37

## 2024-02-08 RX ADMIN — Medication 12: at 12:16

## 2024-02-08 RX ADMIN — SODIUM CHLORIDE 75 MILLILITER(S): 9 INJECTION, SOLUTION INTRAVENOUS at 05:00

## 2024-02-08 RX ADMIN — Medication 10: at 15:36

## 2024-02-08 NOTE — BH CONSULTATION LIAISON ASSESSMENT NOTE - DESCRIPTION
Patient reports that he grew up in [place], completed high school  Patient reports that he grew up in [place], completed high school . 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 he reports after he got better , the refused to let him back in school as it was considered that he dropped out .

## 2024-02-08 NOTE — BH CONSULTATION LIAISON ASSESSMENT NOTE - RISK ASSESSMENT
Risk factors for suicide in this patient are depressed mood , anxiety , chronic medical problems, feelings of demoralization, however he denies current susidal ideations , has no history of substance use disorder , has good family support

## 2024-02-08 NOTE — BH CONSULTATION LIAISON ASSESSMENT NOTE - SUMMARY
Mr Spivey is a 28 year old  man, with a history of ADHD , and an unclear mood disorder who was admitted to the ICU following an over dose on his insulin. According to the ICU team, patient is supposed to take 30 units of insulin daily however patient took 160 units in a suicide attempt. Psychiatry consult was called for depressed mood and suicidal ideations.   Patient appears to be minimizing his symptoms  but seems to have significant feelings of depression and anxiety , haplessness and helplessness that predisposed him to this serious suicide attempt . he seems to have very poor coping skills and poor frustration tolerance to address his stressors .   For now he has no acute symptoms of psychosis or kris . and he denies current suicidal ideations, intent or plan.   At this time, patient is considered a danger to himself  and will likely  need inpatient psychiatric hospitalization upon medical clearance and bed availability .

## 2024-02-08 NOTE — CONSULT NOTE ADULT - ATTENDING COMMENTS
# intentional overdose on insulin   - now off dextrose and eating but poor appetite   - A1c 10.8% at home on basaglar 28 units and admelog based on I: c ratio of 1: 10 g and ISF 1: 20   - did not get any lantus yet and FS running high   - discussed with team and nurse patient should receive his lantus stat because his FS are high and he did not get his basal insulin   - give Lantus 20 units now and then daily in am ( usually patient takes it around 2 pm )   - Admelog 5 units TIDAC for now and sliding scale 2: 50 > 150 TIDAC no bedtime scale   - need psychiatry evaluation given suicide attempts

## 2024-02-08 NOTE — CONSULT NOTE ADULT - SUBJECTIVE AND OBJECTIVE BOX
HPI:  28 years old M with pmhx of type 1 DM and MDD who presents after intentional OD Insulin degludec 160 units around 8 pm SQ. His normal dose is 30 units daily. No other reported overdose. Pt states he has had 2 large meals at today and he usually takes his insulin regularly but he was feeling very depressed for the past couple of days de to some personal problems.      In the ED pts VS were T(C): 36.3  T(F): 97.3  HR: 94  BP: 128/84  RR: 20  SpO2: 99%      Pt is admitted for further workup and management of Overdose   (07 Feb 2024 23:55)      PAST MEDICAL & SURGICAL HISTORY  Type 1 diabetes mellitus  Diagnosed in 2005, managed by Dr. Johnston    Hypercholesterolemia  currently on Lipitor        FAMILY HISTORY:  FAMILY HISTORY:      SOCIAL HISTORY:  []smoker  []Alcohol  []Drug    ALLERGIES:  No Known Allergies      MEDICATIONS:  MEDICATIONS  (STANDING):  chlorhexidine 2% Cloths 1 Application(s) Topical <User Schedule>  dextrose 5%. 1000 milliLiter(s) (100 mL/Hr) IV Continuous <Continuous>  dextrose 5%. 1000 milliLiter(s) (50 mL/Hr) IV Continuous <Continuous>  dextrose 50% Injectable 25 Gram(s) IV Push once  dextrose 50% Injectable 12.5 Gram(s) IV Push once  dextrose 50% Injectable 25 Gram(s) IV Push once  enoxaparin Injectable 40 milliGRAM(s) SubCutaneous every 24 hours  glucagon  Injectable 1 milliGRAM(s) IntraMuscular once  influenza   Vaccine 0.5 milliLiter(s) IntraMuscular once  insulin glargine Injectable (LANTUS) 20 Unit(s) SubCutaneous every morning  insulin lispro (ADMELOG) corrective regimen sliding scale   SubCutaneous three times a day before meals  insulin lispro Injectable (ADMELOG) 5 Unit(s) SubCutaneous three times a day before meals  lactated ringers. 1000 milliLiter(s) (75 mL/Hr) IV Continuous <Continuous>  PARoxetine 20 milliGRAM(s) Oral daily    MEDICATIONS  (PRN):  acetaminophen     Tablet .. 650 milliGRAM(s) Oral every 6 hours PRN Temp greater or equal to 38C (100.4F), Mild Pain (1 - 3)  aluminum hydroxide/magnesium hydroxide/simethicone Suspension 30 milliLiter(s) Oral every 4 hours PRN Dyspepsia  dextrose Oral Gel 15 Gram(s) Oral once PRN Blood Glucose LESS THAN 70 milliGRAM(s)/deciliter  melatonin 3 milliGRAM(s) Oral at bedtime PRN Insomnia  ondansetron Injectable 4 milliGRAM(s) IV Push every 8 hours PRN Nausea and/or Vomiting      HOME MEDICATIONS:  Home Medications:  Admelog 100 units/mL injectable solution: 8 unit(s) subcutaneous 3 times a day (before meals) as per your own home regimen using calculations based on your fingersticks and the amount of carb intake per each meal.  (08 Feb 2024 01:50)  Basaglar KwikPen 100 units/mL subcutaneous solution: 30 unit(s) subcutaneous once a day (at bedtime) (08 Feb 2024 01:50)      VITALS:   T(F): 96.7 (02-08 @ 08:00), Max: 97.3 (02-07 @ 19:23)  HR: 99 (02-08 @ 12:00) (73 - 99)  BP: 117/63 (02-08 @ 10:00) (103/58 - 130/75)  BP(mean): 85 (02-08 @ 10:00) (77 - 95)  RR: 23 (02-08 @ 12:00) (7 - 23)  SpO2: 98% (02-08 @ 12:00) (93% - 99%)    I&O's Summary    07 Feb 2024 07:01  -  08 Feb 2024 07:00  --------------------------------------------------------  IN: 225 mL / OUT: 0 mL / NET: 225 mL    08 Feb 2024 07:01  -  08 Feb 2024 13:15  --------------------------------------------------------  IN: 1170 mL / OUT: 600 mL / NET: 570 mL        REVIEW OF SYSTEMS:  CONSTITUTIONAL: No weakness, fevers or chills  EYES: No visual changes  ENT: No vertigo or throat pain   NECK: No pain or stiffness  RESPIRATORY: No cough, wheezing, hemoptysis; No shortness of breath  CARDIOVASCULAR: No chest pain or palpitations  GASTROINTESTINAL: No abdominal or epigastric pain. No nausea, vomiting, or hematemesis; No diarrhea or constipation. No melena or hematochezia.  GENITOURINARY: No Polyuria  NEUROLOGICAL:  No tremors, no Weakness or numbness  SKIN: No itching, no rashes  MSK: no joint pain    PHYSICAL EXAM:  GENERAL: Patient is awake , alert and oriented,  not in acute distress  EYES: No proptosis, no lid lag  NECK: No thyroid enlargement, no palpable nodules , no bruit  LUNGS: Clear to auscultation bilaterally   CARDIOVASCULAR: S1/S2 present, RRR , no murmurs or rubs  ABD: Soft, non-tender, non-distended, +BS  EXT: No DUANE  SKIN: No abdominal striae  NEURO: No tremors, DTR 2+    LABS:                        14.5   4.88  )-----------( 309      ( 08 Feb 2024 11:47 )             42.4     02-08    133<L>  |  98  |  15  ----------------------------<  294<H>  4.5   |  25  |  0.7    Ca    9.1      08 Feb 2024 04:30  Phos  4.3     02-07  Mg     2.0     02-07    TPro  6.3  /  Alb  4.1  /  TBili  0.4  /  DBili  x   /  AST  21  /  ALT  23  /  AlkPhos  79  02-08 02-08 Chol 191 LDL -- HDL 49 Trig 252<H>  POCT Blood Glucose.: 447 mg/dL (02-08-24 @ 12:02)  POCT Blood Glucose.: 265 mg/dL (02-08-24 @ 08:13)  POCT Blood Glucose.: 303 mg/dL (02-08-24 @ 06:19)  POCT Blood Glucose.: 339 mg/dL (02-08-24 @ 04:41)  POCT Blood Glucose.: 346 mg/dL (02-08-24 @ 02:03)  POCT Blood Glucose.: 407 mg/dL (02-08-24 @ 00:34)  POCT Blood Glucose.: 419 mg/dL (02-07-24 @ 23:31)  POCT Blood Glucose.: 375 mg/dL (02-07-24 @ 22:41)  POCT Blood Glucose.: 323 mg/dL (02-07-24 @ 21:31)  POCT Blood Glucose.: 363 mg/dL (02-07-24 @ 19:32)       HPI:  28 years old M with pmhx of type 1 DM and MDD who presents after intentional OD Insulin degludec 160 units around 8 pm SQ. His normal dose is 30 units daily. No other reported overdose. Pt states he has had 2 large meals at today and he usually takes his insulin regularly but he was feeling very depressed for the past couple of days de to some personal problems.      In the ED pts VS were T(C): 36.3  T(F): 97.3  HR: 94  BP: 128/84  RR: 20  SpO2: 99%      Pt is admitted for further workup and management of Overdose   (07 Feb 2024 23:55)      PAST MEDICAL & SURGICAL HISTORY  Type 1 diabetes mellitus  Diagnosed in 2005, managed by Dr. Johnston    Hypercholesterolemia  currently on Lipitor        FAMILY HISTORY:  FAMILY HISTORY:      SOCIAL HISTORY:  []smoker  []Alcohol  []Drug    ALLERGIES:  No Known Allergies      MEDICATIONS:  MEDICATIONS  (STANDING):  chlorhexidine 2% Cloths 1 Application(s) Topical <User Schedule>  dextrose 5%. 1000 milliLiter(s) (100 mL/Hr) IV Continuous <Continuous>  dextrose 5%. 1000 milliLiter(s) (50 mL/Hr) IV Continuous <Continuous>  dextrose 50% Injectable 25 Gram(s) IV Push once  dextrose 50% Injectable 12.5 Gram(s) IV Push once  dextrose 50% Injectable 25 Gram(s) IV Push once  enoxaparin Injectable 40 milliGRAM(s) SubCutaneous every 24 hours  glucagon  Injectable 1 milliGRAM(s) IntraMuscular once  influenza   Vaccine 0.5 milliLiter(s) IntraMuscular once  insulin glargine Injectable (LANTUS) 20 Unit(s) SubCutaneous every morning  insulin lispro (ADMELOG) corrective regimen sliding scale   SubCutaneous three times a day before meals  insulin lispro Injectable (ADMELOG) 5 Unit(s) SubCutaneous three times a day before meals  lactated ringers. 1000 milliLiter(s) (75 mL/Hr) IV Continuous <Continuous>  PARoxetine 20 milliGRAM(s) Oral daily    MEDICATIONS  (PRN):  acetaminophen     Tablet .. 650 milliGRAM(s) Oral every 6 hours PRN Temp greater or equal to 38C (100.4F), Mild Pain (1 - 3)  aluminum hydroxide/magnesium hydroxide/simethicone Suspension 30 milliLiter(s) Oral every 4 hours PRN Dyspepsia  dextrose Oral Gel 15 Gram(s) Oral once PRN Blood Glucose LESS THAN 70 milliGRAM(s)/deciliter  melatonin 3 milliGRAM(s) Oral at bedtime PRN Insomnia  ondansetron Injectable 4 milliGRAM(s) IV Push every 8 hours PRN Nausea and/or Vomiting      HOME MEDICATIONS:  Home Medications:  Admelog 100 units/mL injectable solution: 8 unit(s) subcutaneous 3 times a day (before meals) as per your own home regimen using calculations based on your fingersticks and the amount of carb intake per each meal.  (08 Feb 2024 01:50)  Basaglar KwikPen 100 units/mL subcutaneous solution: 30 unit(s) subcutaneous once a day (at bedtime) (08 Feb 2024 01:50)      VITALS:   T(F): 96.7 (02-08 @ 08:00), Max: 97.3 (02-07 @ 19:23)  HR: 99 (02-08 @ 12:00) (73 - 99)  BP: 117/63 (02-08 @ 10:00) (103/58 - 130/75)  BP(mean): 85 (02-08 @ 10:00) (77 - 95)  RR: 23 (02-08 @ 12:00) (7 - 23)  SpO2: 98% (02-08 @ 12:00) (93% - 99%)    I&O's Summary    07 Feb 2024 07:01  -  08 Feb 2024 07:00  --------------------------------------------------------  IN: 225 mL / OUT: 0 mL / NET: 225 mL    08 Feb 2024 07:01  -  08 Feb 2024 13:15  --------------------------------------------------------  IN: 1170 mL / OUT: 600 mL / NET: 570 mL        REVIEW OF SYSTEMS:  CONSTITUTIONAL: No weakness, fevers or chills  RESPIRATORY: No cough, wheezing, hemoptysis; No shortness of breath  CARDIOVASCULAR: No chest pain or palpitations  GASTROINTESTINAL: No abdominal or epigastric pain. No nausea, vomiting, or hematemesis; No diarrhea or constipation  GENITOURINARY: No Polyuria      PHYSICAL EXAM:  GENERAL: Patient is awake , alert and oriented,  not in acute distress  NECK: No thyroid enlargement, no palpable nodules , no bruit  LUNGS: Clear to auscultation bilaterally   CARDIOVASCULAR: S1/S2 present, RRR  ABD: Soft, non-tender, non-distended, +BS  EXT: No DUANE      LABS:                        14.5   4.88  )-----------( 309      ( 08 Feb 2024 11:47 )             42.4     02-08    133<L>  |  98  |  15  ----------------------------<  294<H>  4.5   |  25  |  0.7    Ca    9.1      08 Feb 2024 04:30  Phos  4.3     02-07  Mg     2.0     02-07    TPro  6.3  /  Alb  4.1  /  TBili  0.4  /  DBili  x   /  AST  21  /  ALT  23  /  AlkPhos  79  02-08 02-08 Chol 191 LDL -- HDL 49 Trig 252<H>  POCT Blood Glucose.: 447 mg/dL (02-08-24 @ 12:02)  POCT Blood Glucose.: 265 mg/dL (02-08-24 @ 08:13)  POCT Blood Glucose.: 303 mg/dL (02-08-24 @ 06:19)  POCT Blood Glucose.: 339 mg/dL (02-08-24 @ 04:41)  POCT Blood Glucose.: 346 mg/dL (02-08-24 @ 02:03)  POCT Blood Glucose.: 407 mg/dL (02-08-24 @ 00:34)  POCT Blood Glucose.: 419 mg/dL (02-07-24 @ 23:31)  POCT Blood Glucose.: 375 mg/dL (02-07-24 @ 22:41)  POCT Blood Glucose.: 323 mg/dL (02-07-24 @ 21:31)  POCT Blood Glucose.: 363 mg/dL (02-07-24 @ 19:32)
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
## 2024-02-08 NOTE — BH CONSULTATION LIAISON ASSESSMENT NOTE - NSBHCONSULTMEDAGITATION_PSY_A_CORE FT
Recommend Haldol 2 mg P.O Q 6 hrs PRN for agitation. Please note that this medication can be given via the intramuscular route if the patient is severely agitated and is considered a danger to herself or others. Please ensure that QTC is < 500

## 2024-02-08 NOTE — CHART NOTE - NSCHARTNOTEFT_GEN_A_CORE
Brief Toxicology Note:    s: 29 yo M, hx of T1DM who presented yesterday after intentional OD of insulin degludec 160 units SQ around 8 pm yesterday.   o: Vital signs normal. Review of POC dextrose has been in 300s-low 400s.  a: The patient appears to be doing well without dextrose supplementation, and has likely reached the peak absorption for insulin degludec at this time. Therefore, if he is not hypoglycemic this morning, and tolerating PO intake well, he can be cleared from acute toxicological standpoint  p: Recommend checking another POC dextrose, if not hypoglycemic and tolerating PO intake, can be cleared from toxicology team. Recommend continued high carbohydrate intake. Further psychiatric and medical care per medical team.    Thank you for involving us in the care of this patient. Assessment and plan discussed with toxicology attending Dr. Perez. Please do not hesitate to reach out to the toxicology team for any further questions or concerns.    The On-Call Toxicology Fellow can be reached 24/7 via Pager #[phone]  Please send a 10 digit call back # as [place] cover multiple hospitals    Carlos Walker MD  Toxicology Fellow  PGY-5

## 2024-02-08 NOTE — BH CONSULTATION LIAISON ASSESSMENT NOTE - NSBHCONSULTRECOMMENDOTHER_PSY_A_CORE FT
1. There is no indication for the use of any standing psychotropic medication for now   2. We recommend melatonin 5 –10 mg P.O bedtime to regulate sleep wake cycle   3. We recommend behavioral interventions, and environmental modifications to help patient's orientation and help her feel safe in addition to implementing delirium precautions as per nursing staff.   4. The Tele [facility] psychiatry team will follow

## 2024-02-08 NOTE — CONSULT NOTE ADULT - ASSESSMENT
IMPRESSION    #Uncontrolled DM1 2/2 non-compliance  #Suicide attempt with insulin overdose     PLAN  -  f/u   - reinforce compliance  IMPRESSION    #Uncontrolled DM1 2/2 non-compliance  #Suicide attempt with insulin overdose     PLAN  -  f/u   - reinforce compliance   - give Lantus now to avoid DKA in setting of elevated FS

## 2024-02-08 NOTE — PATIENT PROFILE ADULT - FALL HARM RISK - HARM RISK INTERVENTIONS

## 2024-02-08 NOTE — CHART NOTE - NSCHARTNOTEFT_GEN_A_CORE
ICU Transfer Note    Transfer from: ICU   Transfer to: Floor                            HPI: 28 years old M with pmhx of type 1 DM and MDD who presents after intentional OD Insulin degludec 160 units around 8 pm SQ. His normal dose is 30 units daily. No other reported overdose. Pt states he has had 2 large meals at today and he usually takes his insulin regularly but he was feeling very depressed for the past couple of days de to some personal problems.    Patient admitted to the ICU for Intentional overdose on insulin     ICU COURSE:  Pt arrived last night with an intentional overdose of 160 units of insulin. Tox recommendations appreciated. 11am (2/8) glucose was >400, 10 units given, remains on sliding scale and currently eating lunch. Cleared from toxicology for discharge with medically appropriate. Pt responded to me in the am, said he had no complaints. Tele psych was consulted and is planing to call and speak with pt. 1:1 at bedside. Pt is cooroperative. Drug screen negative.        Patient is clinically and hemodynamically stable for  transfer.    ASSESSMENT AND PLAN:  # Medication OD-resolved  Tox recs appreciated, signed off  # Diabetes mellitus  on Insulin  - For the 12 hours, check FS 2 hours, and then every 4 hours  - If hypoglycemic develops, give D50 bolus followed by D10 infusion.  -Sliding scale started   -regular feeding started   -monitor BMP    # Misc  - DVT Prophylaxis: SCDs, lovenox 40 mg  - Diet: Regular   - Code Status: Full code   -Dispo: Floor     PENDINGS:  f/u tele psych recs  Endocine  glucose monitoring   1:1 sitter     ICU Vital Signs Last 24 Hrs  T(C): 35.9 (08 Feb 2024 08:00), Max: 36.3 (07 Feb 2024 19:23)  T(F): 96.7 (08 Feb 2024 08:00), Max: 97.3 (07 Feb 2024 19:23)  HR: 86 (08 Feb 2024 10:00) (73 - 94)  BP: 117/63 (08 Feb 2024 10:00) (103/58 - 130/75)  BP(mean): 85 (08 Feb 2024 10:00) (77 - 95)  ABP: --  ABP(mean): --  RR: 7 (08 Feb 2024 10:00) (7 - 20)  SpO2: 96% (08 Feb 2024 10:00) (93% - 99%)    O2 Parameters below as of 08 Feb 2024 08:08  Patient On (Oxygen Delivery Method): room air          I&O's Summary    07 Feb 2024 07:01  -  08 Feb 2024 07:00  --------------------------------------------------------  IN: 225 mL / OUT: 0 mL / NET: 225 mL    08 Feb 2024 07:01  -  08 Feb 2024 12:08  --------------------------------------------------------  IN: 615 mL / OUT: 0 mL / NET: 615 mL          LABS:                        14.6   4.51  )-----------( 271      ( 08 Feb 2024 04:30 )             42.9     02-08    133<L>  |  98  |  15  ----------------------------<  294<H>  4.5   |  25  |  0.7    Ca    9.1      08 Feb 2024 04:30  Phos  4.3     02-07  Mg     2.0     02-07    TPro  6.3  /  Alb  4.1  /  TBili  0.4  /  DBili  x   /  AST  21  /  ALT  23  /  AlkPhos  79  02-08      Urinalysis Basic - ( 08 Feb 2024 04:30 )    Color: x / Appearance: x / SG: x / pH: x  Gluc: 294 mg/dL / Ketone: x  / Bili: x / Urobili: x   Blood: x / Protein: x / Nitrite: x   Leuk Esterase: x / RBC: x / WBC x   Sq Epi: x / Non Sq Epi: x / Bacteria: x      CAPILLARY BLOOD GLUCOSE      POCT Blood Glucose.: 447 mg/dL (08 Feb 2024 12:02)  POCT Blood Glucose.: 265 mg/dL (08 Feb 2024 08:13)  POCT Blood Glucose.: 303 mg/dL (08 Feb 2024 06:19)  POCT Blood Glucose.: 339 mg/dL (08 Feb 2024 04:41)  POCT Blood Glucose.: 346 mg/dL (08 Feb 2024 02:03)  POCT Blood Glucose.: 407 mg/dL (08 Feb 2024 00:34)  POCT Blood Glucose.: 419 mg/dL (07 Feb 2024 23:31)  POCT Blood Glucose.: 375 mg/dL (07 Feb 2024 22:41)  POCT Blood Glucose.: 323 mg/dL (07 Feb 2024 21:31)  POCT Blood Glucose.: 363 mg/dL (07 Feb 2024 19:32)        RADIOLOGY & ADDITIONAL TESTS:    MEDICATIONS  (STANDING):  chlorhexidine 2% Cloths 1 Application(s) Topical <User Schedule>  dextrose 5%. 1000 milliLiter(s) (50 mL/Hr) IV Continuous <Continuous>  dextrose 5%. 1000 milliLiter(s) (100 mL/Hr) IV Continuous <Continuous>  dextrose 50% Injectable 25 Gram(s) IV Push once  dextrose 50% Injectable 12.5 Gram(s) IV Push once  dextrose 50% Injectable 25 Gram(s) IV Push once  enoxaparin Injectable 40 milliGRAM(s) SubCutaneous every 24 hours  glucagon  Injectable 1 milliGRAM(s) IntraMuscular once  influenza   Vaccine 0.5 milliLiter(s) IntraMuscular once  insulin lispro (ADMELOG) corrective regimen sliding scale   SubCutaneous three times a day before meals  lactated ringers. 1000 milliLiter(s) (75 mL/Hr) IV Continuous <Continuous>  PARoxetine 20 milliGRAM(s) Oral daily    MEDICATIONS  (PRN):  acetaminophen     Tablet .. 650 milliGRAM(s) Oral every 6 hours PRN Temp greater or equal to 38C (100.4F), Mild Pain (1 - 3)  aluminum hydroxide/magnesium hydroxide/simethicone Suspension 30 milliLiter(s) Oral every 4 hours PRN Dyspepsia  dextrose Oral Gel 15 Gram(s) Oral once PRN Blood Glucose LESS THAN 70 milliGRAM(s)/deciliter  melatonin 3 milliGRAM(s) Oral at bedtime PRN Insomnia  ondansetron Injectable 4 milliGRAM(s) IV Push every 8 hours PRN Nausea and/or Vomiting              Fly Ricks PGY1

## 2024-02-08 NOTE — BH CONSULTATION LIAISON ASSESSMENT NOTE - NSBHCHARTREVIEWVS_PSY_A_CORE FT
Vital Signs Last 24 Hrs  T(C): 35.9 (08 Feb 2024 08:00), Max: 36.3 (07 Feb 2024 19:23)  T(F): 96.7 (08 Feb 2024 08:00), Max: 97.3 (07 Feb 2024 19:23)  HR: 99 (08 Feb 2024 12:00) (73 - 99)  BP: 117/63 (08 Feb 2024 10:00) (103/58 - 130/75)  BP(mean): 85 (08 Feb 2024 10:00) (77 - 95)  RR: 23 (08 Feb 2024 12:00) (7 - 23)  SpO2: 98% (08 Feb 2024 12:00) (93% - 99%)    Parameters below as of 08 Feb 2024 08:08  Patient On (Oxygen Delivery Method): room air

## 2024-02-08 NOTE — BH CONSULTATION LIAISON ASSESSMENT NOTE - CURRENT MEDICATION
MEDICATIONS  (STANDING):  chlorhexidine 2% Cloths 1 Application(s) Topical <User Schedule>  dextrose 5%. 1000 milliLiter(s) (50 mL/Hr) IV Continuous <Continuous>  dextrose 5%. 1000 milliLiter(s) (100 mL/Hr) IV Continuous <Continuous>  dextrose 50% Injectable 25 Gram(s) IV Push once  dextrose 50% Injectable 25 Gram(s) IV Push once  dextrose 50% Injectable 12.5 Gram(s) IV Push once  enoxaparin Injectable 40 milliGRAM(s) SubCutaneous every 24 hours  glucagon  Injectable 1 milliGRAM(s) IntraMuscular once  influenza   Vaccine 0.5 milliLiter(s) IntraMuscular once  insulin glargine Injectable (LANTUS) 20 Unit(s) SubCutaneous every morning  insulin lispro (ADMELOG) corrective regimen sliding scale   SubCutaneous three times a day before meals  insulin lispro Injectable (ADMELOG) 5 Unit(s) SubCutaneous three times a day before meals  lactated ringers. 1000 milliLiter(s) (75 mL/Hr) IV Continuous <Continuous>  PARoxetine 20 milliGRAM(s) Oral daily    MEDICATIONS  (PRN):  acetaminophen     Tablet .. 650 milliGRAM(s) Oral every 6 hours PRN Temp greater or equal to 38C (100.4F), Mild Pain (1 - 3)  aluminum hydroxide/magnesium hydroxide/simethicone Suspension 30 milliLiter(s) Oral every 4 hours PRN Dyspepsia  dextrose Oral Gel 15 Gram(s) Oral once PRN Blood Glucose LESS THAN 70 milliGRAM(s)/deciliter  melatonin 3 milliGRAM(s) Oral at bedtime PRN Insomnia  ondansetron Injectable 4 milliGRAM(s) IV Push every 8 hours PRN Nausea and/or Vomiting

## 2024-02-08 NOTE — BH CONSULTATION LIAISON ASSESSMENT NOTE - NS ED BHA TELEPSYCH PATIENT LOCATION
A: Met with Augie Bell to explain  role and to discuss aftercare options.    R: Outpatient Mental Health Medication Provider: Patient does not see a provider for outpatient mental health medication but would like to be set up with one. Patient signed an KISHA for this provider and would like an aftercare appointment made.         Primary Care Provider: Patient currently sees PCP Nelda Mccormick NP. Patient does not want information shared with this provider. Patient did not sign an KISHA for this provider.         Outpatient Therapist: Patient does not see an outpatient therapist but would like to be set up with one. Patient signed an KISHA for this provider and would like an aftercare appointment made.        Patient is aware of aftercare options. Patient does have virtual capabilities at home. Patient confirmed address and telephone number listed on the facesheet.     P: Will collaborate with treatment team and with the patient before setting up further aftercare, which will be complete by time of discharge.     Thi Nieto LPC, SAC-IT-      9/22/2022     SI - N

## 2024-02-08 NOTE — BH CONSULTATION LIAISON ASSESSMENT NOTE - OTHER PAST PSYCHIATRIC HISTORY (INCLUDE DETAILS REGARDING ONSET, COURSE OF ILLNESS, INPATIENT/OUTPATIENT TREATMENT)
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 he reports that he can not remember if he was placed on any type of medication at that time, however he has not been seeing a psychiatrist since then but follows up with a therapist . He denies past suicide attempts .

## 2024-02-09 LAB
A1C WITH ESTIMATED AVERAGE GLUCOSE RESULT: 10.8 % — HIGH (ref 4–5.6)
ANION GAP SERPL CALC-SCNC: 10 MMOL/L — SIGNIFICANT CHANGE UP (ref 7–14)
ANION GAP SERPL CALC-SCNC: 12 MMOL/L — SIGNIFICANT CHANGE UP (ref 7–14)
APPEARANCE UR: CLEAR — SIGNIFICANT CHANGE UP
B-OH-BUTYR SERPL-SCNC: 0.2 MMOL/L — SIGNIFICANT CHANGE UP
BILIRUB UR-MCNC: NEGATIVE — SIGNIFICANT CHANGE UP
BUN SERPL-MCNC: 18 MG/DL — SIGNIFICANT CHANGE UP (ref 10–20)
BUN SERPL-MCNC: 21 MG/DL — HIGH (ref 10–20)
CALCIUM SERPL-MCNC: 8.7 MG/DL — SIGNIFICANT CHANGE UP (ref 8.4–10.4)
CALCIUM SERPL-MCNC: 8.9 MG/DL — SIGNIFICANT CHANGE UP (ref 8.4–10.5)
CHLORIDE SERPL-SCNC: 100 MMOL/L — SIGNIFICANT CHANGE UP (ref 98–110)
CHLORIDE SERPL-SCNC: 98 MMOL/L — SIGNIFICANT CHANGE UP (ref 98–110)
CO2 SERPL-SCNC: 24 MMOL/L — SIGNIFICANT CHANGE UP (ref 17–32)
CO2 SERPL-SCNC: 24 MMOL/L — SIGNIFICANT CHANGE UP (ref 17–32)
COLOR SPEC: YELLOW — SIGNIFICANT CHANGE UP
CREAT SERPL-MCNC: 0.7 MG/DL — SIGNIFICANT CHANGE UP (ref 0.7–1.5)
CREAT SERPL-MCNC: 0.8 MG/DL — SIGNIFICANT CHANGE UP (ref 0.7–1.5)
DIFF PNL FLD: NEGATIVE — SIGNIFICANT CHANGE UP
EGFR: 124 ML/MIN/1.73M2 — SIGNIFICANT CHANGE UP
EGFR: 129 ML/MIN/1.73M2 — SIGNIFICANT CHANGE UP
ESTIMATED AVERAGE GLUCOSE: 263 MG/DL — HIGH (ref 68–114)
GLUCOSE BLDC GLUCOMTR-MCNC: 182 MG/DL — HIGH (ref 70–99)
GLUCOSE BLDC GLUCOMTR-MCNC: 199 MG/DL — HIGH (ref 70–99)
GLUCOSE BLDC GLUCOMTR-MCNC: 253 MG/DL — HIGH (ref 70–99)
GLUCOSE BLDC GLUCOMTR-MCNC: 264 MG/DL — HIGH (ref 70–99)
GLUCOSE BLDC GLUCOMTR-MCNC: 83 MG/DL — SIGNIFICANT CHANGE UP (ref 70–99)
GLUCOSE SERPL-MCNC: 317 MG/DL — HIGH (ref 70–99)
GLUCOSE SERPL-MCNC: 327 MG/DL — HIGH (ref 70–99)
GLUCOSE UR QL: 500 MG/DL
HCT VFR BLD CALC: 41.7 % — LOW (ref 42–52)
HGB BLD-MCNC: 14.2 G/DL — SIGNIFICANT CHANGE UP (ref 14–18)
KETONES UR-MCNC: NEGATIVE MG/DL — SIGNIFICANT CHANGE UP
LEUKOCYTE ESTERASE UR-ACNC: NEGATIVE — SIGNIFICANT CHANGE UP
MAGNESIUM SERPL-MCNC: 1.9 MG/DL — SIGNIFICANT CHANGE UP (ref 1.8–2.4)
MCHC RBC-ENTMCNC: 29 PG — SIGNIFICANT CHANGE UP (ref 27–31)
MCHC RBC-ENTMCNC: 34.1 G/DL — SIGNIFICANT CHANGE UP (ref 32–37)
MCV RBC AUTO: 85.3 FL — SIGNIFICANT CHANGE UP (ref 80–94)
NITRITE UR-MCNC: NEGATIVE — SIGNIFICANT CHANGE UP
NRBC # BLD: 0 /100 WBCS — SIGNIFICANT CHANGE UP (ref 0–0)
PH UR: 7.5 — SIGNIFICANT CHANGE UP (ref 5–8)
PHOSPHATE SERPL-MCNC: 4.3 MG/DL — SIGNIFICANT CHANGE UP (ref 2.1–4.9)
PLATELET # BLD AUTO: 281 K/UL — SIGNIFICANT CHANGE UP (ref 130–400)
PMV BLD: 9.6 FL — SIGNIFICANT CHANGE UP (ref 7.4–10.4)
POTASSIUM SERPL-MCNC: 4.1 MMOL/L — SIGNIFICANT CHANGE UP (ref 3.5–5)
POTASSIUM SERPL-MCNC: 4.3 MMOL/L — SIGNIFICANT CHANGE UP (ref 3.5–5)
POTASSIUM SERPL-SCNC: 4.1 MMOL/L — SIGNIFICANT CHANGE UP (ref 3.5–5)
POTASSIUM SERPL-SCNC: 4.3 MMOL/L — SIGNIFICANT CHANGE UP (ref 3.5–5)
PROT UR-MCNC: NEGATIVE MG/DL — SIGNIFICANT CHANGE UP
RBC # BLD: 4.89 M/UL — SIGNIFICANT CHANGE UP (ref 4.7–6.1)
RBC # FLD: 11.3 % — LOW (ref 11.5–14.5)
SODIUM SERPL-SCNC: 134 MMOL/L — LOW (ref 135–146)
SODIUM SERPL-SCNC: 134 MMOL/L — LOW (ref 135–146)
SP GR SPEC: 1.03 — SIGNIFICANT CHANGE UP (ref 1–1.03)
UROBILINOGEN FLD QL: 0.2 MG/DL — SIGNIFICANT CHANGE UP (ref 0.2–1)
WBC # BLD: 5.33 K/UL — SIGNIFICANT CHANGE UP (ref 4.8–10.8)
WBC # FLD AUTO: 5.33 K/UL — SIGNIFICANT CHANGE UP (ref 4.8–10.8)

## 2024-02-09 PROCEDURE — 99232 SBSQ HOSP IP/OBS MODERATE 35: CPT

## 2024-02-09 PROCEDURE — 71045 X-RAY EXAM CHEST 1 VIEW: CPT | Mod: 26

## 2024-02-09 PROCEDURE — 99231 SBSQ HOSP IP/OBS SF/LOW 25: CPT | Mod: 95

## 2024-02-09 RX ORDER — HYDROXYZINE HCL 10 MG
50 TABLET ORAL EVERY 6 HOURS
Refills: 0 | Status: DISCONTINUED | OUTPATIENT
Start: 2024-02-09 | End: 2024-02-12

## 2024-02-09 RX ORDER — HALOPERIDOL DECANOATE 100 MG/ML
2 INJECTION INTRAMUSCULAR EVERY 6 HOURS
Refills: 0 | Status: DISCONTINUED | OUTPATIENT
Start: 2024-02-09 | End: 2024-02-12

## 2024-02-09 RX ORDER — INSULIN LISPRO 100/ML
6 VIAL (ML) SUBCUTANEOUS
Refills: 0 | Status: DISCONTINUED | OUTPATIENT
Start: 2024-02-09 | End: 2024-02-10

## 2024-02-09 RX ORDER — INSULIN GLARGINE 100 [IU]/ML
4 INJECTION, SOLUTION SUBCUTANEOUS ONCE
Refills: 0 | Status: COMPLETED | OUTPATIENT
Start: 2024-02-09 | End: 2024-02-09

## 2024-02-09 RX ORDER — INSULIN GLARGINE 100 [IU]/ML
24 INJECTION, SOLUTION SUBCUTANEOUS EVERY MORNING
Refills: 0 | Status: DISCONTINUED | OUTPATIENT
Start: 2024-02-10 | End: 2024-02-10

## 2024-02-09 RX ADMIN — Medication 6 UNIT(S): at 18:38

## 2024-02-09 RX ADMIN — Medication 6: at 10:58

## 2024-02-09 RX ADMIN — Medication 2: at 18:38

## 2024-02-09 RX ADMIN — CHLORHEXIDINE GLUCONATE 1 APPLICATION(S): 213 SOLUTION TOPICAL at 05:17

## 2024-02-09 RX ADMIN — INSULIN GLARGINE 4 UNIT(S): 100 INJECTION, SOLUTION SUBCUTANEOUS at 10:57

## 2024-02-09 RX ADMIN — INSULIN GLARGINE 20 UNIT(S): 100 INJECTION, SOLUTION SUBCUTANEOUS at 08:31

## 2024-02-09 RX ADMIN — Medication 6: at 08:31

## 2024-02-09 RX ADMIN — Medication 6 UNIT(S): at 10:59
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
## 2024-02-09 NOTE — PROGRESS NOTE ADULT - ASSESSMENT
IMPRESSION:    # Medication OD/ insulin/ suicidal attempt  # Diabetes mellitus  on Insulin  # HLD?    PLAN:    CNS: Avoid CNS Depressants ,  fup    HEENT: Oral care. Aspiration precautions     PULMONARY: HOB @ 45. Monitor Pulse Ox. Keep > 93%. Supplement as needed.    CARDIOVASCULAR:   - Daily Weight. Strict I&Os. Keep I < O    GI: GI prophylaxis not needed, carb consistent diet    RENAL: Avoid nephrotoxic agents     INFECTIOUS DISEASE: monitor vs    HEMATOLOGICAL: Lovenox 40 mg    ENDOCRINE: fup fs  - Monitor BMP    MUSCULOSKELETAL: Ambulate as tolerated     CODE STATUS: Full code    DISPOSITION: floor  1:1 sit   IMPRESSION:    # Medication OD/ insulin/ suicidal attempt  # Diabetes mellitus  on Insulin  # HLD    PLAN:    CNS: Avoid CNS Depressants ,  fup    HEENT: Oral care. Aspiration precautions     PULMONARY: HOB @ 45. Monitor Pulse Ox. Keep > 93%. Supplement as needed.    CARDIOVASCULAR:   - Daily Weight. Strict I&Os. dc IVF    GI: GI prophylaxis not needed, carb consistent diet    RENAL: Avoid nephrotoxic agents     INFECTIOUS DISEASE: monitor vs    HEMATOLOGICAL: Lovenox 40 mg    ENDOCRINE: fup fs  - Monitor BMP    MUSCULOSKELETAL: Ambulate as tolerated     CODE STATUS: Full code    DISPOSITION: floor  1:1 sit

## 2024-02-09 NOTE — BH CONSULTATION LIAISON PROGRESS NOTE - NSBHCONSULTRECOMMENDOTHER_PSY_A_CORE FT
1. We recommend starting Zoloft 50mg P.O Q AM after meals , please give first dose now   2. We recommend melatonin 5 –10 mg P.O bedtime to regulate sleep wake cycle   3. We recommend behavioral interventions, and environmental modifications to help patient's orientation and help her feel safe in addition to implementing delirium precautions as per nursing staff.   4. Patient will need inpatient psychiatry hospitalization upon medical clearance ( at least 24 hours after downgrade from ICU ) and bed avalabiluity   5. The  psychiatry team will follow  1. We recommend starting Zoloft 50mg P.O Q AM after meals , please give first dose now   2. We recommend melatonin 5 –10 mg P.O bedtime to regulate sleep wake cycle   3. there is no acute indication for the use of ADHD medication for now   4. We recommend behavioral interventions, and environmental modifications to help patient's orientation and help her feel safe in addition to implementing delirium precautions as per nursing staff.   5. Patient will need inpatient psychiatry hospitalization upon medical clearance ( at least 24 hours after downgrade from ICU ) and bed avalabiluity   6. The  psychiatry team will follow

## 2024-02-09 NOTE — PROGRESS NOTE ADULT - SUBJECTIVE AND OBJECTIVE BOX
Over Night Events: events noted, on RA, BH / endo noted, on RA    PHYSICAL EXAM    ICU Vital Signs Last 24 Hrs  T(C): 36.4 (09 Feb 2024 04:20), Max: 36.4 (08 Feb 2024 20:00)  T(F): 97.6 (09 Feb 2024 04:20), Max: 97.6 (09 Feb 2024 04:20)  HR: 66 (09 Feb 2024 06:15) (62 - 101)  BP: 108/63 (09 Feb 2024 06:15) (107/63 - 132/79)  BP(mean): 79 (09 Feb 2024 06:15) (78 - 100)  RR: 13 (09 Feb 2024 06:15) (6 - 28)  SpO2: 98% (09 Feb 2024 06:15) (95% - 98%)    O2 Parameters below as of 09 Feb 2024 04:20  Patient On (Oxygen Delivery Method): room air            General: Comfortbale  HEENT: KEYON             Lymph Nodes: No cervical LN   Lungs: Bilateral BS  Cardiovascular: Regular   Abdomen: Soft, Positive BS  Extremities: No clubbing   Skin: Warm  Neurological: Non focal       02-07-24 @ 07:01  -  02-08-24 @ 07:00  --------------------------------------------------------  IN:    Lactated Ringers: 225 mL  Total IN: 225 mL    OUT:  Total OUT: 0 mL    Total NET: 225 mL      02-08-24 @ 07:01  -  02-09-24 @ 06:50  --------------------------------------------------------  IN:    Lactated Ringers: 1650 mL    Oral Fluid: 960 mL  Total IN: 2610 mL    OUT:    Voided (mL): 1100 mL  Total OUT: 1100 mL    Total NET: 1510 mL          LABS:                          14.2   5.33  )-----------( 281      ( 09 Feb 2024 04:23 )             41.7                                               02-09    134<L>  |  100  |  18  ----------------------------<  317<H>  4.3   |  24  |  0.7    Ca    8.7      09 Feb 2024 04:23  Phos  4.3     02-09  Mg     1.9     02-09    TPro  6.6  /  Alb  4.2  /  TBili  0.4  /  DBili  x   /  AST  20  /  ALT  24  /  AlkPhos  85  02-08                                             Urinalysis Basic - ( 09 Feb 2024 04:23 )    Color: x / Appearance: x / SG: x / pH: x  Gluc: 317 mg/dL / Ketone: x  / Bili: x / Urobili: x   Blood: x / Protein: x / Nitrite: x   Leuk Esterase: x / RBC: x / WBC x   Sq Epi: x / Non Sq Epi: x / Bacteria: x                                                  LIVER FUNCTIONS - ( 08 Feb 2024 11:47 )  Alb: 4.2 g/dL / Pro: 6.6 g/dL / ALK PHOS: 85 U/L / ALT: 24 U/L / AST: 20 U/L / GGT: x                                                                                                                                       MEDICATIONS  (STANDING):  chlorhexidine 2% Cloths 1 Application(s) Topical <User Schedule>  dextrose 5%. 1000 milliLiter(s) (100 mL/Hr) IV Continuous <Continuous>  dextrose 5%. 1000 milliLiter(s) (50 mL/Hr) IV Continuous <Continuous>  dextrose 50% Injectable 25 Gram(s) IV Push once  dextrose 50% Injectable 12.5 Gram(s) IV Push once  dextrose 50% Injectable 25 Gram(s) IV Push once  enoxaparin Injectable 40 milliGRAM(s) SubCutaneous every 24 hours  glucagon  Injectable 1 milliGRAM(s) IntraMuscular once  influenza   Vaccine 0.5 milliLiter(s) IntraMuscular once  insulin glargine Injectable (LANTUS) 20 Unit(s) SubCutaneous every morning  insulin lispro (ADMELOG) corrective regimen sliding scale   SubCutaneous three times a day before meals  insulin lispro Injectable (ADMELOG) 5 Unit(s) SubCutaneous three times a day before meals  lactated ringers. 1000 milliLiter(s) (75 mL/Hr) IV Continuous <Continuous>  PARoxetine 20 milliGRAM(s) Oral daily    MEDICATIONS  (PRN):  acetaminophen     Tablet .. 650 milliGRAM(s) Oral every 6 hours PRN Temp greater or equal to 38C (100.4F), Mild Pain (1 - 3)  aluminum hydroxide/magnesium hydroxide/simethicone Suspension 30 milliLiter(s) Oral every 4 hours PRN Dyspepsia  dextrose Oral Gel 15 Gram(s) Oral once PRN Blood Glucose LESS THAN 70 milliGRAM(s)/deciliter  melatonin 3 milliGRAM(s) Oral at bedtime PRN Insomnia  ondansetron Injectable 4 milliGRAM(s) IV Push every 8 hours PRN Nausea and/or Vomiting      Xrays:                                                                                     ECHO     Over Night Events: events noted, on RA, BH / endo noted, on RA    PHYSICAL EXAM    ICU Vital Signs Last 24 Hrs  T(C): 36.4 (09 Feb 2024 04:20), Max: 36.4 (08 Feb 2024 20:00)  T(F): 97.6 (09 Feb 2024 04:20), Max: 97.6 (09 Feb 2024 04:20)  HR: 66 (09 Feb 2024 06:15) (62 - 101)  BP: 108/63 (09 Feb 2024 06:15) (107/63 - 132/79)  BP(mean): 79 (09 Feb 2024 06:15) (78 - 100)  RR: 13 (09 Feb 2024 06:15) (6 - 28)  SpO2: 98% (09 Feb 2024 06:15) (95% - 98%)    O2 Parameters below as of 09 Feb 2024 04:20  Patient On (Oxygen Delivery Method): room air            General: Comfortable  HEENT: KEYON             Lymph Nodes: No cervical LN   Lungs: Bilateral BS  Cardiovascular: Regular   Abdomen: Soft, Positive BS  Extremities: No clubbing   Skin: Warm  Neurological: Non focal       02-07-24 @ 07:01  -  02-08-24 @ 07:00  --------------------------------------------------------  IN:    Lactated Ringers: 225 mL  Total IN: 225 mL    OUT:  Total OUT: 0 mL    Total NET: 225 mL      02-08-24 @ 07:01  -  02-09-24 @ 06:50  --------------------------------------------------------  IN:    Lactated Ringers: 1650 mL    Oral Fluid: 960 mL  Total IN: 2610 mL    OUT:    Voided (mL): 1100 mL  Total OUT: 1100 mL    Total NET: 1510 mL          LABS:                          14.2   5.33  )-----------( 281      ( 09 Feb 2024 04:23 )             41.7                                               02-09    134<L>  |  100  |  18  ----------------------------<  317<H>  4.3   |  24  |  0.7    Ca    8.7      09 Feb 2024 04:23  Phos  4.3     02-09  Mg     1.9     02-09    TPro  6.6  /  Alb  4.2  /  TBili  0.4  /  DBili  x   /  AST  20  /  ALT  24  /  AlkPhos  85  02-08                                             Urinalysis Basic - ( 09 Feb 2024 04:23 )    Color: x / Appearance: x / SG: x / pH: x  Gluc: 317 mg/dL / Ketone: x  / Bili: x / Urobili: x   Blood: x / Protein: x / Nitrite: x   Leuk Esterase: x / RBC: x / WBC x   Sq Epi: x / Non Sq Epi: x / Bacteria: x                                                  LIVER FUNCTIONS - ( 08 Feb 2024 11:47 )  Alb: 4.2 g/dL / Pro: 6.6 g/dL / ALK PHOS: 85 U/L / ALT: 24 U/L / AST: 20 U/L / GGT: x                                                                                                                                       MEDICATIONS  (STANDING):  chlorhexidine 2% Cloths 1 Application(s) Topical <User Schedule>  dextrose 5%. 1000 milliLiter(s) (100 mL/Hr) IV Continuous <Continuous>  dextrose 5%. 1000 milliLiter(s) (50 mL/Hr) IV Continuous <Continuous>  dextrose 50% Injectable 25 Gram(s) IV Push once  dextrose 50% Injectable 12.5 Gram(s) IV Push once  dextrose 50% Injectable 25 Gram(s) IV Push once  enoxaparin Injectable 40 milliGRAM(s) SubCutaneous every 24 hours  glucagon  Injectable 1 milliGRAM(s) IntraMuscular once  influenza   Vaccine 0.5 milliLiter(s) IntraMuscular once  insulin glargine Injectable (LANTUS) 20 Unit(s) SubCutaneous every morning  insulin lispro (ADMELOG) corrective regimen sliding scale   SubCutaneous three times a day before meals  insulin lispro Injectable (ADMELOG) 5 Unit(s) SubCutaneous three times a day before meals  lactated ringers. 1000 milliLiter(s) (75 mL/Hr) IV Continuous <Continuous>  PARoxetine 20 milliGRAM(s) Oral daily    MEDICATIONS  (PRN):  acetaminophen     Tablet .. 650 milliGRAM(s) Oral every 6 hours PRN Temp greater or equal to 38C (100.4F), Mild Pain (1 - 3)  aluminum hydroxide/magnesium hydroxide/simethicone Suspension 30 milliLiter(s) Oral every 4 hours PRN Dyspepsia  dextrose Oral Gel 15 Gram(s) Oral once PRN Blood Glucose LESS THAN 70 milliGRAM(s)/deciliter  melatonin 3 milliGRAM(s) Oral at bedtime PRN Insomnia  ondansetron Injectable 4 milliGRAM(s) IV Push every 8 hours PRN Nausea and/or Vomiting

## 2024-02-09 NOTE — PROGRESS NOTE ADULT - ASSESSMENT
# intentional overdose on insulin   - now off dextrose and eating but poor appetite   - A1c 10.8% at home on basaglar 28 units and admelog based on I: c ratio of 1: 10 g and ISF 1: 20   - increase Lantus to 22 units daily   - increase  Admelog to 8 units units TIDAC for now and sliding scale 2: 50 > 150 TIDAC no bedtime scale   -psych follow up

## 2024-02-09 NOTE — PROGRESS NOTE ADULT - SUBJECTIVE AND OBJECTIVE BOX
S: patient feeling better today, reports eating more meals then usual as very hungry   O:Vital Signs Last 24 Hrs  T(C): 36.6 (09 Feb 2024 13:42), Max: 36.6 (09 Feb 2024 08:00)  T(F): 97.8 (09 Feb 2024 13:42), Max: 97.8 (09 Feb 2024 08:00)  HR: 98 (09 Feb 2024 13:42) (62 - 101)  BP: 114/64 (09 Feb 2024 13:42) (102/66 - 132/79)  BP(mean): 78 (09 Feb 2024 09:00) (78 - 100)  RR: 18 (09 Feb 2024 13:42) (6 - 28)  SpO2: 98% (09 Feb 2024 13:42) (95% - 98%)    Parameters below as of 09 Feb 2024 08:00  Patient On (Oxygen Delivery Method): room air          CAPILLARY BLOOD GLUCOSE      POCT Blood Glucose.: 253 mg/dL (09 Feb 2024 10:53)  POCT Blood Glucose.: 264 mg/dL (09 Feb 2024 08:25)  POCT Blood Glucose.: 246 mg/dL (08 Feb 2024 20:16)  POCT Blood Glucose.: 359 mg/dL (08 Feb 2024 16:26)  POCT Blood Glucose.: 393 mg/dL (08 Feb 2024 15:22)  POCT Blood Glucose.: 422 mg/dL (08 Feb 2024 14:34)        MEDICATIONS  (STANDING):  chlorhexidine 2% Cloths 1 Application(s) Topical <User Schedule>  dextrose 5%. 1000 milliLiter(s) (100 mL/Hr) IV Continuous <Continuous>  dextrose 5%. 1000 milliLiter(s) (50 mL/Hr) IV Continuous <Continuous>  dextrose 50% Injectable 25 Gram(s) IV Push once  dextrose 50% Injectable 12.5 Gram(s) IV Push once  dextrose 50% Injectable 25 Gram(s) IV Push once  enoxaparin Injectable 40 milliGRAM(s) SubCutaneous every 24 hours  glucagon  Injectable 1 milliGRAM(s) IntraMuscular once  influenza   Vaccine 0.5 milliLiter(s) IntraMuscular once  insulin glargine Injectable (LANTUS) 20 Unit(s) SubCutaneous every morning  insulin lispro (ADMELOG) corrective regimen sliding scale   SubCutaneous three times a day before meals  insulin lispro Injectable (ADMELOG) 6 Unit(s) SubCutaneous three times a day before meals    MEDICATIONS  (PRN):  acetaminophen     Tablet .. 650 milliGRAM(s) Oral every 6 hours PRN Temp greater or equal to 38C (100.4F), Mild Pain (1 - 3)  aluminum hydroxide/magnesium hydroxide/simethicone Suspension 30 milliLiter(s) Oral every 4 hours PRN Dyspepsia  dextrose Oral Gel 15 Gram(s) Oral once PRN Blood Glucose LESS THAN 70 milliGRAM(s)/deciliter  haloperidol     Tablet 2 milliGRAM(s) Oral every 6 hours PRN Agitation  hydrOXYzine hydrochloride 50 milliGRAM(s) Oral every 6 hours PRN Agitation  melatonin 3 milliGRAM(s) Oral at bedtime PRN Insomnia  ondansetron Injectable 4 milliGRAM(s) IV Push every 8 hours PRN Nausea and/or Vomiting

## 2024-02-09 NOTE — CHART NOTE - NSCHARTNOTEFT_GEN_A_CORE
CU Transfer Note    Transfer from: ICU   Transfer to: Floor                            HPI: 28 years old M with pmhx of type 1 DM and MDD who presents after intentional OD Insulin degludec 160 units around 8 pm SQ. His normal dose is 30 units daily. No other reported overdose. Pt states he has had 2 large meals at today and he usually takes his insulin regularly but he was feeling very depressed for the past couple of days de to some personal problems.    Patient admitted to the ICU for Intentional overdose on insulin     ICU COURSE:  Pt arrived last night with an intentional overdose of 160 units of insulin. Tox recommendations appreciated. 11am (2/8) glucose was >400, 10 units given, remains on sliding scale and currently eating lunch. Cleared from toxicology for discharge with medically appropriate. Pt responded to me in the am, said he had no complaints. Tele psych was consulted and is planing to call and speak with pt. 1:1 at bedside. Pt is cooroperative. Drug screen negative.        Patient is clinically and hemodynamically stable for  transfer.    ASSESSMENT AND PLAN:  # Medication OD-resolved  Tox recs appreciated, signed off  # Diabetes mellitus  on Insulin  - For the 12 hours, check FS 2 hours, and then every 4 hours  - If hypoglycemic develops, give D50 bolus followed by D10 infusion.  -Sliding scale started   -regular feeding started   -monitor BMP    # Misc  - DVT Prophylaxis: SCDs, lovenox 40 mg  - Diet: Regular   - Code Status: Full code   -Dispo: Floor     PENDINGS:  f/u tele psych recs  Endocine  glucose monitoring   1:1 sitter       ICU Vital Signs Last 24 Hrs  T(C): 36.6 (09 Feb 2024 08:00), Max: 36.6 (09 Feb 2024 08:00)  T(F): 97.8 (09 Feb 2024 08:00), Max: 97.8 (09 Feb 2024 08:00)  HR: 73 (09 Feb 2024 08:00) (62 - 101)  BP: 122/72 (09 Feb 2024 08:00) (106/71 - 132/79)  BP(mean): 92 (09 Feb 2024 08:00) (79 - 100)  ABP: --  ABP(mean): --  RR: 12 (09 Feb 2024 08:00) (6 - 28)  SpO2: 96% (09 Feb 2024 08:00) (95% - 98%)    O2 Parameters below as of 09 Feb 2024 08:00  Patient On (Oxygen Delivery Method): room air          I&O's Summary    08 Feb 2024 07:01  -  09 Feb 2024 07:00  --------------------------------------------------------  IN: 2610 mL / OUT: 1100 mL / NET: 1510 mL          LABS:                        14.2   5.33  )-----------( 281      ( 09 Feb 2024 04:23 )             41.7     02-09    134<L>  |  100  |  18  ----------------------------<  317<H>  4.3   |  24  |  0.7    Ca    8.7      09 Feb 2024 04:23  Phos  4.3     02-09  Mg     1.9     02-09    TPro  6.6  /  Alb  4.2  /  TBili  0.4  /  DBili  x   /  AST  20  /  ALT  24  /  AlkPhos  85  02-08      Urinalysis Basic - ( 09 Feb 2024 04:23 )    Color: x / Appearance: x / SG: x / pH: x  Gluc: 317 mg/dL / Ketone: x  / Bili: x / Urobili: x   Blood: x / Protein: x / Nitrite: x   Leuk Esterase: x / RBC: x / WBC x   Sq Epi: x / Non Sq Epi: x / Bacteria: x      CAPILLARY BLOOD GLUCOSE      POCT Blood Glucose.: 264 mg/dL (09 Feb 2024 08:25)  POCT Blood Glucose.: 246 mg/dL (08 Feb 2024 20:16)  POCT Blood Glucose.: 359 mg/dL (08 Feb 2024 16:26)  POCT Blood Glucose.: 393 mg/dL (08 Feb 2024 15:22)  POCT Blood Glucose.: 422 mg/dL (08 Feb 2024 14:34)  POCT Blood Glucose.: 447 mg/dL (08 Feb 2024 12:02)        RADIOLOGY & ADDITIONAL TESTS:    MEDICATIONS  (STANDING):  chlorhexidine 2% Cloths 1 Application(s) Topical <User Schedule>  dextrose 5%. 1000 milliLiter(s) (50 mL/Hr) IV Continuous <Continuous>  dextrose 5%. 1000 milliLiter(s) (100 mL/Hr) IV Continuous <Continuous>  dextrose 50% Injectable 25 Gram(s) IV Push once  dextrose 50% Injectable 25 Gram(s) IV Push once  dextrose 50% Injectable 12.5 Gram(s) IV Push once  enoxaparin Injectable 40 milliGRAM(s) SubCutaneous every 24 hours  glucagon  Injectable 1 milliGRAM(s) IntraMuscular once  influenza   Vaccine 0.5 milliLiter(s) IntraMuscular once  insulin glargine Injectable (LANTUS) 4 Unit(s) SubCutaneous once  insulin glargine Injectable (LANTUS) 20 Unit(s) SubCutaneous every morning  insulin lispro (ADMELOG) corrective regimen sliding scale   SubCutaneous three times a day before meals  insulin lispro Injectable (ADMELOG) 6 Unit(s) SubCutaneous three times a day before meals    MEDICATIONS  (PRN):  acetaminophen     Tablet .. 650 milliGRAM(s) Oral every 6 hours PRN Temp greater or equal to 38C (100.4F), Mild Pain (1 - 3)  aluminum hydroxide/magnesium hydroxide/simethicone Suspension 30 milliLiter(s) Oral every 4 hours PRN Dyspepsia  dextrose Oral Gel 15 Gram(s) Oral once PRN Blood Glucose LESS THAN 70 milliGRAM(s)/deciliter  haloperidol     Tablet 2 milliGRAM(s) Oral every 6 hours PRN Agitation  hydrOXYzine hydrochloride 50 milliGRAM(s) Oral every 6 hours PRN Agitation  melatonin 3 milliGRAM(s) Oral at bedtime PRN Insomnia  ondansetron Injectable 4 milliGRAM(s) IV Push every 8 hours PRN Nausea and/or Vomiting            Fly Ricks PGY1 CU Transfer Note    Transfer from: ICU   Transfer to: Floor                            HPI: 28 years old M with pmhx of type 1 DM and MDD who presents after intentional OD Insulin degludec 160 units around 8 pm SQ. His normal dose is 30 units daily. No other reported overdose. Pt states he has had 2 large meals at today and he usually takes his insulin regularly but he was feeling very depressed for the past couple of days de to some personal problems.    Patient admitted to the ICU for Intentional overdose on insulin     ICU COURSE:  Pt arrived last night with an intentional overdose of 160 units of insulin. Tox recommendations appreciated. 11am (2/8) glucose was >400, 10 units given, remains on sliding scale and currently eating lunch. Cleared from toxicology for discharge with medically appropriate. Pt responded to me in the am, said he had no complaints. Tele psych was consulted and is planing to call and speak with pt. 1:1 at bedside. Pt is cooroperative. Drug screen negative. Pt started on 5 units of lanatus q24 in the morning with an additional         Patient is clinically and hemodynamically stable for  transfer.    ASSESSMENT AND PLAN:  # Medication OD-resolved  -psych recs appreciated  -Atarax 50mg p.o Q 6 hours PRN for anxiety and insomnia  -Haldol 2 mg P.O Q 6 hrs PRN for agitation.   -Ensure that QTC is < 500    Tox recs appreciated, signed off  # Diabetes mellitus type 1  -Lantus 24 units in the morning  -Lispro 4 units TID   -Sliding scale   -Finger sticks q4 hours   - If hypoglycemic develops, give D50 bolus followed by D10 infusion.  -regular diet   -monitor BMP      # Misc  - DVT Prophylaxis: SCDs, lovenox 40 mg  - Diet: Regular   - Code Status: Full code   -Dispo: Floor     PENDINGS:  [facility] reasses   Endocine  glucose monitoring   1:1 sitter       ICU Vital Signs Last 24 Hrs  T(C): 36.6 (09 Feb 2024 08:00), Max: 36.6 (09 Feb 2024 08:00)  T(F): 97.8 (09 Feb 2024 08:00), Max: 97.8 (09 Feb 2024 08:00)  HR: 73 (09 Feb 2024 08:00) (62 - 101)  BP: 122/72 (09 Feb 2024 08:00) (106/71 - 132/79)  BP(mean): 92 (09 Feb 2024 08:00) (79 - 100)  ABP: --  ABP(mean): --  RR: 12 (09 Feb 2024 08:00) (6 - 28)  SpO2: 96% (09 Feb 2024 08:00) (95% - 98%)    O2 Parameters below as of 09 Feb 2024 08:00  Patient On (Oxygen Delivery Method): room air          I&O's Summary    08 Feb 2024 07:01  -  09 Feb 2024 07:00  --------------------------------------------------------  IN: 2610 mL / OUT: 1100 mL / NET: 1510 mL          LABS:                        14.2   5.33  )-----------( 281      ( 09 Feb 2024 04:23 )             41.7     02-09    134<L>  |  100  |  18  ----------------------------<  317<H>  4.3   |  24  |  0.7    Ca    8.7      09 Feb 2024 04:23  Phos  4.3     02-09  Mg     1.9     02-09    TPro  6.6  /  Alb  4.2  /  TBili  0.4  /  DBili  x   /  AST  20  /  ALT  24  /  AlkPhos  85  02-08      Urinalysis Basic - ( 09 Feb 2024 04:23 )    Color: x / Appearance: x / SG: x / pH: x  Gluc: 317 mg/dL / Ketone: x  / Bili: x / Urobili: x   Blood: x / Protein: x / Nitrite: x   Leuk Esterase: x / RBC: x / WBC x   Sq Epi: x / Non Sq Epi: x / Bacteria: x      CAPILLARY BLOOD GLUCOSE      POCT Blood Glucose.: 264 mg/dL (09 Feb 2024 08:25)  POCT Blood Glucose.: 246 mg/dL (08 Feb 2024 20:16)  POCT Blood Glucose.: 359 mg/dL (08 Feb 2024 16:26)  POCT Blood Glucose.: 393 mg/dL (08 Feb 2024 15:22)  POCT Blood Glucose.: 422 mg/dL (08 Feb 2024 14:34)  POCT Blood Glucose.: 447 mg/dL (08 Feb 2024 12:02)        RADIOLOGY & ADDITIONAL TESTS:    MEDICATIONS  (STANDING):  chlorhexidine 2% Cloths 1 Application(s) Topical <User Schedule>  dextrose 5%. 1000 milliLiter(s) (50 mL/Hr) IV Continuous <Continuous>  dextrose 5%. 1000 milliLiter(s) (100 mL/Hr) IV Continuous <Continuous>  dextrose 50% Injectable 25 Gram(s) IV Push once  dextrose 50% Injectable 25 Gram(s) IV Push once  dextrose 50% Injectable 12.5 Gram(s) IV Push once  enoxaparin Injectable 40 milliGRAM(s) SubCutaneous every 24 hours  glucagon  Injectable 1 milliGRAM(s) IntraMuscular once  influenza   Vaccine 0.5 milliLiter(s) IntraMuscular once  insulin glargine Injectable (LANTUS) 4 Unit(s) SubCutaneous once  insulin glargine Injectable (LANTUS) 20 Unit(s) SubCutaneous every morning  insulin lispro (ADMELOG) corrective regimen sliding scale   SubCutaneous three times a day before meals  insulin lispro Injectable (ADMELOG) 6 Unit(s) SubCutaneous three times a day before meals    MEDICATIONS  (PRN):  acetaminophen     Tablet .. 650 milliGRAM(s) Oral every 6 hours PRN Temp greater or equal to 38C (100.4F), Mild Pain (1 - 3)  aluminum hydroxide/magnesium hydroxide/simethicone Suspension 30 milliLiter(s) Oral every 4 hours PRN Dyspepsia  dextrose Oral Gel 15 Gram(s) Oral once PRN Blood Glucose LESS THAN 70 milliGRAM(s)/deciliter  haloperidol     Tablet 2 milliGRAM(s) Oral every 6 hours PRN Agitation  hydrOXYzine hydrochloride 50 milliGRAM(s) Oral every 6 hours PRN Agitation  melatonin 3 milliGRAM(s) Oral at bedtime PRN Insomnia  ondansetron Injectable 4 milliGRAM(s) IV Push every 8 hours PRN Nausea and/or Vomiting            Fly Ricks PGY1 CU Transfer Note    Transfer from: ICU   Transfer to: Floor                            HPI: 28 years old M with pmhx of type 1 DM and MDD who presents after intentional OD Insulin degludec 160 units around 8 pm SQ. His normal dose is 30 units daily. No other reported overdose. Pt states he has had 2 large meals at today and he usually takes his insulin regularly but he was feeling very depressed for the past couple of days de to some personal problems.    Patient admitted to the ICU for Intentional overdose on insulin     ICU COURSE:  Pt arrived last night with an intentional overdose of 160 units of insulin. Tox recommendations appreciated. 11am (2/8) glucose was >400, 10 units given, remains on sliding scale and currently eating lunch. Cleared from toxicology for discharge with medically appropriate. Pt responded to me in the am, said he had no complaints. Tele psych was consulted and is planing to call and speak with pt. 1:1 at bedside. Pt is cooroperative. Drug screen negative. Pt started on 20 units of lanatus and 5 of lispro, this is increased to 24 lantas and 6 of lispro on 2/9. Tele pych recs appreciated, will need to be revalued before discharge.           Patient is clinically and hemodynamically stable for  transfer.    ASSESSMENT AND PLAN:  # Medication OD-resolved  -psych recs appreciated  -Atarax 50mg p.o Q 6 hours PRN for anxiety and insomnia  -Haldol 2 mg P.O Q 6 hrs PRN for agitation.   -Ensure that QTC is < 500    Tox recs appreciated, signed off  # Diabetes mellitus type 1  -Lantus 24 units in the morning  -Lispro 6 units TID   -Sliding scale   -Finger sticks q4 hours   - If hypoglycemic develops, give D50 bolus followed by D10 infusion.  -regular diet   -monitor BMP      # Misc  - DVT Prophylaxis: SCDs, lovenox 40 mg  - Diet: Regular   - Code Status: Full code   -Dispo: Floor     PENDINGS:  [facility] reasses   Endocine  glucose monitoring   1:1 sitter       ICU Vital Signs Last 24 Hrs  T(C): 36.6 (09 Feb 2024 08:00), Max: 36.6 (09 Feb 2024 08:00)  T(F): 97.8 (09 Feb 2024 08:00), Max: 97.8 (09 Feb 2024 08:00)  HR: 73 (09 Feb 2024 08:00) (62 - 101)  BP: 122/72 (09 Feb 2024 08:00) (106/71 - 132/79)  BP(mean): 92 (09 Feb 2024 08:00) (79 - 100)  ABP: --  ABP(mean): --  RR: 12 (09 Feb 2024 08:00) (6 - 28)  SpO2: 96% (09 Feb 2024 08:00) (95% - 98%)    O2 Parameters below as of 09 Feb 2024 08:00  Patient On (Oxygen Delivery Method): room air          I&O's Summary    08 Feb 2024 07:01  -  09 Feb 2024 07:00  --------------------------------------------------------  IN: 2610 mL / OUT: 1100 mL / NET: 1510 mL          LABS:                        14.2   5.33  )-----------( 281      ( 09 Feb 2024 04:23 )             41.7     02-09    134<L>  |  100  |  18  ----------------------------<  317<H>  4.3   |  24  |  0.7    Ca    8.7      09 Feb 2024 04:23  Phos  4.3     02-09  Mg     1.9     02-09    TPro  6.6  /  Alb  4.2  /  TBili  0.4  /  DBili  x   /  AST  20  /  ALT  24  /  AlkPhos  85  02-08      Urinalysis Basic - ( 09 Feb 2024 04:23 )    Color: x / Appearance: x / SG: x / pH: x  Gluc: 317 mg/dL / Ketone: x  / Bili: x / Urobili: x   Blood: x / Protein: x / Nitrite: x   Leuk Esterase: x / RBC: x / WBC x   Sq Epi: x / Non Sq Epi: x / Bacteria: x      CAPILLARY BLOOD GLUCOSE      POCT Blood Glucose.: 264 mg/dL (09 Feb 2024 08:25)  POCT Blood Glucose.: 246 mg/dL (08 Feb 2024 20:16)  POCT Blood Glucose.: 359 mg/dL (08 Feb 2024 16:26)  POCT Blood Glucose.: 393 mg/dL (08 Feb 2024 15:22)  POCT Blood Glucose.: 422 mg/dL (08 Feb 2024 14:34)  POCT Blood Glucose.: 447 mg/dL (08 Feb 2024 12:02)        RADIOLOGY & ADDITIONAL TESTS:    MEDICATIONS  (STANDING):  chlorhexidine 2% Cloths 1 Application(s) Topical <User Schedule>  dextrose 5%. 1000 milliLiter(s) (50 mL/Hr) IV Continuous <Continuous>  dextrose 5%. 1000 milliLiter(s) (100 mL/Hr) IV Continuous <Continuous>  dextrose 50% Injectable 25 Gram(s) IV Push once  dextrose 50% Injectable 25 Gram(s) IV Push once  dextrose 50% Injectable 12.5 Gram(s) IV Push once  enoxaparin Injectable 40 milliGRAM(s) SubCutaneous every 24 hours  glucagon  Injectable 1 milliGRAM(s) IntraMuscular once  influenza   Vaccine 0.5 milliLiter(s) IntraMuscular once  insulin glargine Injectable (LANTUS) 4 Unit(s) SubCutaneous once  insulin glargine Injectable (LANTUS) 20 Unit(s) SubCutaneous every morning  insulin lispro (ADMELOG) corrective regimen sliding scale   SubCutaneous three times a day before meals  insulin lispro Injectable (ADMELOG) 6 Unit(s) SubCutaneous three times a day before meals    MEDICATIONS  (PRN):  acetaminophen     Tablet .. 650 milliGRAM(s) Oral every 6 hours PRN Temp greater or equal to 38C (100.4F), Mild Pain (1 - 3)  aluminum hydroxide/magnesium hydroxide/simethicone Suspension 30 milliLiter(s) Oral every 4 hours PRN Dyspepsia  dextrose Oral Gel 15 Gram(s) Oral once PRN Blood Glucose LESS THAN 70 milliGRAM(s)/deciliter  haloperidol     Tablet 2 milliGRAM(s) Oral every 6 hours PRN Agitation  hydrOXYzine hydrochloride 50 milliGRAM(s) Oral every 6 hours PRN Agitation  melatonin 3 milliGRAM(s) Oral at bedtime PRN Insomnia  ondansetron Injectable 4 milliGRAM(s) IV Push every 8 hours PRN Nausea and/or Vomiting            Fly Ricks PGY1

## 2024-02-10 ENCOUNTER — TRANSCRIPTION ENCOUNTER (OUTPATIENT)
Age: 29
End: 2024-02-10

## 2024-02-10 LAB
AMPHET UR-MCNC: NEGATIVE — SIGNIFICANT CHANGE UP
BARBITURATES UR SCN-MCNC: NEGATIVE — SIGNIFICANT CHANGE UP
BENZODIAZ UR-MCNC: NEGATIVE — SIGNIFICANT CHANGE UP
COCAINE METAB.OTHER UR-MCNC: NEGATIVE — SIGNIFICANT CHANGE UP
DRUG SCREEN 1, URINE RESULT: SIGNIFICANT CHANGE UP
FENTANYL UR QL: NEGATIVE — SIGNIFICANT CHANGE UP
GLUCOSE BLDC GLUCOMTR-MCNC: 123 MG/DL — HIGH (ref 70–99)
GLUCOSE BLDC GLUCOMTR-MCNC: 134 MG/DL — HIGH (ref 70–99)
GLUCOSE BLDC GLUCOMTR-MCNC: 161 MG/DL — HIGH (ref 70–99)
GLUCOSE BLDC GLUCOMTR-MCNC: 199 MG/DL — HIGH (ref 70–99)
GLUCOSE BLDC GLUCOMTR-MCNC: 229 MG/DL — HIGH (ref 70–99)
METHADONE UR-MCNC: NEGATIVE — SIGNIFICANT CHANGE UP
OPIATES UR-MCNC: NEGATIVE — SIGNIFICANT CHANGE UP
OXYCODONE UR-MCNC: NEGATIVE — SIGNIFICANT CHANGE UP
PCP SPEC-MCNC: SIGNIFICANT CHANGE UP
PCP UR-MCNC: NEGATIVE — SIGNIFICANT CHANGE UP
PROPOXYPHENE QUALITATIVE URINE RESULT: NEGATIVE — SIGNIFICANT CHANGE UP
THC UR QL: NEGATIVE — SIGNIFICANT CHANGE UP

## 2024-02-10 PROCEDURE — 99232 SBSQ HOSP IP/OBS MODERATE 35: CPT

## 2024-02-10 PROCEDURE — 99239 HOSP IP/OBS DSCHRG MGMT >30: CPT

## 2024-02-10 RX ORDER — INSULIN LISPRO 100/ML
8 VIAL (ML) SUBCUTANEOUS
Refills: 0 | Status: DISCONTINUED | OUTPATIENT
Start: 2024-02-10 | End: 2024-02-10

## 2024-02-10 RX ORDER — INSULIN LISPRO 100/ML
8 VIAL (ML) SUBCUTANEOUS
Qty: 0 | Refills: 0 | DISCHARGE

## 2024-02-10 RX ORDER — INSULIN GLARGINE 100 [IU]/ML
30 INJECTION, SOLUTION SUBCUTANEOUS
Refills: 0 | DISCHARGE

## 2024-02-10 RX ORDER — INSULIN LISPRO 100/ML
8 VIAL (ML) SUBCUTANEOUS
Refills: 0 | Status: DISCONTINUED | OUTPATIENT
Start: 2024-02-10 | End: 2024-02-12

## 2024-02-10 RX ORDER — LANOLIN ALCOHOL/MO/W.PET/CERES
1 CREAM (GRAM) TOPICAL
Qty: 15 | Refills: 0
Start: 2024-02-10 | End: 2024-02-24

## 2024-02-10 RX ORDER — INSULIN GLARGINE 100 [IU]/ML
22 INJECTION, SOLUTION SUBCUTANEOUS
Qty: 0 | Refills: 0 | DISCHARGE
Start: 2024-02-10

## 2024-02-10 RX ORDER — INSULIN LISPRO 100/ML
8 VIAL (ML) SUBCUTANEOUS
Qty: 1 | Refills: 0
Start: 2024-02-10

## 2024-02-10 RX ADMIN — Medication 3 MILLIGRAM(S): at 21:17

## 2024-02-10 RX ADMIN — Medication 650 MILLIGRAM(S): at 16:16

## 2024-02-10 RX ADMIN — INSULIN GLARGINE 20 UNIT(S): 100 INJECTION, SOLUTION SUBCUTANEOUS at 08:39

## 2024-02-10 RX ADMIN — Medication 2: at 08:37

## 2024-02-10 RX ADMIN — Medication 2: at 17:22

## 2024-02-10 RX ADMIN — Medication 8 UNIT(S): at 17:23

## 2024-02-10 RX ADMIN — Medication 8 UNIT(S): at 08:38

## 2024-02-10 RX ADMIN — Medication 8 UNIT(S): at 11:55

## 2024-02-10 RX ADMIN — CHLORHEXIDINE GLUCONATE 1 APPLICATION(S): 213 SOLUTION TOPICAL at 06:36

## 2024-02-10 NOTE — DISCHARGE NOTE PROVIDER - CARE PROVIDER_API CALL
Britton Covarrubias  Psychiatry  [address] [place] [id number]  Phone: [phone]  Fax: [phone]  Follow Up Time: 2 weeks

## 2024-02-10 NOTE — BH CONSULTATION LIAISON PROGRESS NOTE - NSBHATTESTCOMMENTATTENDFT_PSY_A_CORE
Patient evaluated with resident, agree with note and plan.  Patient requires IPP transfer once medically cleared.

## 2024-02-10 NOTE — BH CONSULTATION LIAISON PROGRESS NOTE - NSBHFUPINTERVALHXFT_PSY_A_CORE
Patient was seen in AM. Chart reviewed. On approach, patient was laying in bed, cooperative with interview. Patient was AAOx4. Patient reports acceptance of psychiatric inpatient hospitalization, although he reports he would rather go home. Patient currently denying depressed mood, poor appetite, and poor sleep. Patient currently denying suicidal ideation and reports that he has not had any suicidal thoughts since being in the hospital. Patient denies A/V hallucinations.

## 2024-02-10 NOTE — PROGRESS NOTE ADULT - SUBJECTIVE AND OBJECTIVE BOX
pt seen and examined.     My notes supersede resident's notes in case of discrepancy       ROS: no cp, no sob, no n/v, no fever    Vital Signs Last 24 Hrs  T(C): 36.1 (10 Feb 2024 05:17), Max: 36.9 (09 Feb 2024 20:30)  T(F): 97 (10 Feb 2024 05:17), Max: 98.5 (09 Feb 2024 20:30)  HR: 67 (10 Feb 2024 05:17) (67 - 98)  BP: 107/61 (10 Feb 2024 05:17) (107/61 - 114/64)  BP(mean): --  RR: 18 (09 Feb 2024 20:30) (18 - 18)  SpO2: 98% (09 Feb 2024 20:30) (98% - 98%)    Parameters below as of 09 Feb 2024 20:30  Patient On (Oxygen Delivery Method): room air        physical exam  constitutional NAD, AAOX3, Respiratory  lungs CTA, CVS heart RRR, GI: abdomen Soft NT, ND, BS+, skin: intact  neuro exam no focal deficit     MEDICATIONS  (STANDING):  chlorhexidine 2% Cloths 1 Application(s) Topical <User Schedule>  dextrose 5%. 1000 milliLiter(s) (50 mL/Hr) IV Continuous <Continuous>  dextrose 5%. 1000 milliLiter(s) (100 mL/Hr) IV Continuous <Continuous>  dextrose 50% Injectable 25 Gram(s) IV Push once  dextrose 50% Injectable 12.5 Gram(s) IV Push once  dextrose 50% Injectable 25 Gram(s) IV Push once  enoxaparin Injectable 40 milliGRAM(s) SubCutaneous every 24 hours  glucagon  Injectable 1 milliGRAM(s) IntraMuscular once  influenza   Vaccine 0.5 milliLiter(s) IntraMuscular once  insulin glargine Injectable (LANTUS) 20 Unit(s) SubCutaneous every morning  insulin lispro (ADMELOG) corrective regimen sliding scale   SubCutaneous three times a day before meals  insulin lispro Injectable (ADMELOG) 8 Unit(s) SubCutaneous three times a day before meals    MEDICATIONS  (PRN):  acetaminophen     Tablet .. 650 milliGRAM(s) Oral every 6 hours PRN Temp greater or equal to 38C (100.4F), Mild Pain (1 - 3)  aluminum hydroxide/magnesium hydroxide/simethicone Suspension 30 milliLiter(s) Oral every 4 hours PRN Dyspepsia  dextrose Oral Gel 15 Gram(s) Oral once PRN Blood Glucose LESS THAN 70 milliGRAM(s)/deciliter  haloperidol     Tablet 2 milliGRAM(s) Oral every 6 hours PRN Agitation  hydrOXYzine hydrochloride 50 milliGRAM(s) Oral every 6 hours PRN Agitation  melatonin 3 milliGRAM(s) Oral at bedtime PRN Insomnia  ondansetron Injectable 4 milliGRAM(s) IV Push every 8 hours PRN Nausea and/or Vomiting                        14.2   5.33  )-----------( 281      ( 09 Feb 2024 04:23 )             41.7     02-09    134<L>  |  100  |  18  ----------------------------<  317<H>  4.3   |  24  |  0.7    Ca    8.7      09 Feb 2024 04:23  Phos  4.3     02-09  Mg     1.9     02-09    COVID-19 PCR: NotDetec (02-07-24 @ 21:20)    a/p  28 year old with hx of dm type 1, sp od with insulin on 2/7,   endo and psychiatrist consults appreciated  insulin adjusted  POCT Blood Glucose.: 123 mg/dL (02-10-24 @ 11:43)  POCT Blood Glucose.: 199 mg/dL (02-10-24 @ 08:10)  POCT Blood Glucose.: 229 mg/dL (02-10-24 @ 02:03)  POCT Blood Glucose.: 83 mg/dL (02-09-24 @ 21:11)  POCT Blood Glucose.: 199 mg/dL (02-09-24 @ 18:34)  POCT Blood Glucose.: 182 mg/dL (02-09-24 @ 16:30)    dw pshyciatry team, pt is medically stable but requires pshyciatric inpt care and treatment,   dc to IPP   time spent 35 min

## 2024-02-10 NOTE — DISCHARGE NOTE PROVIDER - HOSPITAL COURSE
28 years old M with pmhx of type 1 DM and MDD who presents after intentional OD Insulin degludec 160 units around 8 pm SQ. His normal dose is 30 units daily. No other reported overdose. Pt states he has had 2 large meals at today and he usually takes his insulin regularly but he was feeling very depressed for the past couple of days de to some personal problems.  Patient admitted to the ICU for Intentional overdose on insulin   ICU COURSE:  Pt arrived last night with an intentional overdose of 160 units of insulin. Tox recommendations appreciated. 11am (2/8) glucose was >400, 10 units given, remains on sliding scale and currently eating lunch. Cleared from toxicology for discharge with medically appropriate. Pt responded to me in the am, said he had no complaints. Tele psych was consulted and is planing to call and speak with pt. 1:1 at bedside. Pt is cooperative. Drug screen negative. Pt started on 20 units of lanatus and 5 of lispro, this is increased to 24 lantas and 6 of lispro on 2/9. Tele pych recs appreciated,      1. We recommend starting Zoloft 50mg P.O Q AM after meals , please give first dose now   2. We recommend melatonin 5 –10 mg P.O bedtime to regulate sleep wake cycle   3. there is no acute indication for the use of ADHD medication for now   4. We recommend behavioral interventions, and environmental modifications to help patient's orientation and help her feel safe in addition to implementing delirium precautions as per nursing staff.   - Patient being discharged to [facility]    Patient is clinically and hemodynamically stable.  ASSESSMENT AND PLAN:  # Medication OD-resolved  -psych recs appreciated  -Atarax 50mg p.o Q 6 hours PRN for anxiety and insomnia  -Haldol 2 mg P.O Q 6 hrs PRN for agitation.   -Ensure that QTC is < 500    Tox recs appreciated, signed off  # Diabetes mellitus type 1  -Lantus 24 units in the morning  -Lispro 6 units TID   -Sliding scale   -Finger sticks q4 hours   - If hypoglycemic develops, give D50 bolus followed by D10 infusion.  -regular diet

## 2024-02-10 NOTE — DISCHARGE NOTE PROVIDER - NSDCFUSCHEDAPPT_GEN_ALL_CORE_FT
Jenny López  [facility] PreAdmits  Scheduled Appointment: 05/01/2024    [facility]  [address]  Scheduled Appointment: 05/01/2024

## 2024-02-10 NOTE — BH CONSULTATION LIAISON PROGRESS NOTE - NSBHCONSULTRECOMMENDOTHER_PSY_A_CORE FT
1. We recommend starting Zoloft 50mg P.O Q AM after meals , please give first dose now   2. We recommend melatonin 5 –10 mg P.O bedtime to regulate sleep wake cycle   3. there is no acute indication for the use of ADHD medication for now   4. We recommend behavioral interventions, and environmental modifications to help patient's orientation and help her feel safe in addition to implementing delirium precautions as per nursing staff.   5. Patient will need inpatient psychiatry hospitalization upon medical clearance ( at least 24 hours after downgrade from ICU ) and bed avalabiluity   6. The  psychiatry team will follow

## 2024-02-10 NOTE — DISCHARGE NOTE PROVIDER - NSDCCPCAREPLAN_GEN_ALL_CORE_FT
PRINCIPAL DISCHARGE DIAGNOSIS  Diagnosis: Intentional overdose of insulin  Assessment and Plan of Treatment: You presented to the hospital with insulin overdose, you were initially admitted to ICU for strict monitoring, You remained hemodynamically stable in the unit. You were transfered to the regular floor. Psychiatry was consulted.   Endocrinology was consulted for recommendations. Please follow the insulin regimen as mentioned.   Please follow up with endocrinologist and Psychiatrist as oupatient. Please strictly follow the insulin regimen, do not skip any doses and do not skip any meals after insulin.   Diabetes is a chronic condition caused by high blood sugar levels. Your blood sugar levels become high because your body does not have enough insulin. Insulin helps move sugar out of the blood so it can be used for energy. Increased sugar in your blood can cause problems in several organs of your body including but not limited to your blood vessels, your kidneys, your brain, and your eyes. The lack of insulin forces your body to use fat instead of sugar for energy. This can be dangerous if not controlled.  Seek Medical Attention If:  You have a seizure.  You begin to breathe fast, or are short of breath.  You become weak and confused.  You are more drowsy than usual.  You have fruity, sweet breath.  You have severe, new stomach pain and are vomiting.  Your blood sugar level is lower or higher than your healthcare provider says it should be.  You have ketones in your blood or urine.  You have a fever or chills.  You are more thirsty than usual.  You are urinating more often than usual.  You have questions or concerns about your condition or care.  Insulin and diabetes medicine decreases the amount of sugar in your blood.  The best way to prevent problems from Diabetes is to control your blood sugars.  Monitor your blood sugar levels closely. Administer Insulin as directed by your physician.   Speak with your doctor and/or a nutritionist about the best way to change your lifestyle and dietary habits to avoid any problems from Diabetes in the future.        SECONDARY DISCHARGE DIAGNOSES  Diagnosis: Suicidal behavior with attempted self-injury  Assessment and Plan of Treatment:

## 2024-02-10 NOTE — DISCHARGE NOTE PROVIDER - NSDCMRMEDTOKEN_GEN_ALL_CORE_FT
Admelog 100 units/mL injectable solution: 8 unit(s) subcutaneous 3 times a day (before meals) as per your own home regimen using calculations based on your fingersticks and the amount of carb intake per each meal.   HumaLOG KwikPen 100 units/mL injectable solution: 8 unit(s) injectable 3 times a day (before meals)  insulin glargine 100 units/mL subcutaneous solution: 22 unit(s) subcutaneous once a day (in the morning)  melatonin 3 mg oral tablet: 1 tab(s) orally once a day (at bedtime) as needed for Insomnia   HumaLOG KwikPen 100 units/mL injectable solution: 8 unit(s) injectable 3 times a day (before meals)  insulin glargine 100 units/mL subcutaneous solution: 22 unit(s) subcutaneous once a day (in the morning)  melatonin 3 mg oral tablet: 1 tab(s) orally once a day (at bedtime) as needed for Insomnia  Zoloft 50 mg oral tablet: 1 tab(s) orally once a day

## 2024-02-11 LAB
DRUG SCREEN, SERUM: SIGNIFICANT CHANGE UP
GLUCOSE BLDC GLUCOMTR-MCNC: 126 MG/DL — HIGH (ref 70–99)
GLUCOSE BLDC GLUCOMTR-MCNC: 217 MG/DL — HIGH (ref 70–99)
GLUCOSE BLDC GLUCOMTR-MCNC: 315 MG/DL — HIGH (ref 70–99)
GLUCOSE BLDC GLUCOMTR-MCNC: 320 MG/DL — HIGH (ref 70–99)

## 2024-02-11 PROCEDURE — 99232 SBSQ HOSP IP/OBS MODERATE 35: CPT

## 2024-02-11 PROCEDURE — 99231 SBSQ HOSP IP/OBS SF/LOW 25: CPT

## 2024-02-11 RX ORDER — INSULIN GLARGINE 100 [IU]/ML
22 INJECTION, SOLUTION SUBCUTANEOUS AT BEDTIME
Refills: 0 | Status: DISCONTINUED | OUTPATIENT
Start: 2024-02-11 | End: 2024-02-11

## 2024-02-11 RX ORDER — SERTRALINE 25 MG/1
50 TABLET, FILM COATED ORAL DAILY
Refills: 0 | Status: DISCONTINUED | OUTPATIENT
Start: 2024-02-11 | End: 2024-02-12

## 2024-02-11 RX ORDER — SOD,AMMONIUM,POTASSIUM LACTATE
1 CREAM (GRAM) TOPICAL
Refills: 0 | Status: DISCONTINUED | OUTPATIENT
Start: 2024-02-11 | End: 2024-02-11

## 2024-02-11 RX ORDER — SCOPALAMINE 1 MG/3D
1 PATCH, EXTENDED RELEASE TRANSDERMAL ONCE
Refills: 0 | Status: DISCONTINUED | OUTPATIENT
Start: 2024-02-11 | End: 2024-02-11

## 2024-02-11 RX ORDER — INSULIN GLARGINE 100 [IU]/ML
22 INJECTION, SOLUTION SUBCUTANEOUS EVERY MORNING
Refills: 0 | Status: DISCONTINUED | OUTPATIENT
Start: 2024-02-11 | End: 2024-02-12

## 2024-02-11 RX ORDER — METHOCARBAMOL 500 MG/1
500 TABLET, FILM COATED ORAL ONCE
Refills: 0 | Status: COMPLETED | OUTPATIENT
Start: 2024-02-11 | End: 2024-02-11

## 2024-02-11 RX ADMIN — Medication 8 UNIT(S): at 17:43

## 2024-02-11 RX ADMIN — Medication 8: at 17:42

## 2024-02-11 RX ADMIN — Medication 4: at 12:16

## 2024-02-11 RX ADMIN — Medication 8: at 08:35

## 2024-02-11 RX ADMIN — Medication 8 UNIT(S): at 08:36

## 2024-02-11 RX ADMIN — INSULIN GLARGINE 20 UNIT(S): 100 INJECTION, SOLUTION SUBCUTANEOUS at 08:10

## 2024-02-11 RX ADMIN — CHLORHEXIDINE GLUCONATE 1 APPLICATION(S): 213 SOLUTION TOPICAL at 07:21

## 2024-02-11 RX ADMIN — SERTRALINE 50 MILLIGRAM(S): 25 TABLET, FILM COATED ORAL at 14:01

## 2024-02-11 RX ADMIN — Medication 8 UNIT(S): at 12:17

## 2024-02-11 RX ADMIN — Medication 3 MILLIGRAM(S): at 20:44

## 2024-02-11 RX ADMIN — METHOCARBAMOL 500 MILLIGRAM(S): 500 TABLET, FILM COATED ORAL at 13:24

## 2024-02-11 NOTE — BH CONSULTATION LIAISON PROGRESS NOTE - NSBHCHARTREVIEWVS_PSY_A_CORE FT
Vital Signs Last 24 Hrs  T(C): 36.6 (09 Feb 2024 08:00), Max: 36.6 (09 Feb 2024 08:00)  T(F): 97.8 (09 Feb 2024 08:00), Max: 97.8 (09 Feb 2024 08:00)  HR: 100 (09 Feb 2024 10:00) (62 - 101)  BP: 102/66 (09 Feb 2024 09:00) (102/66 - 132/79)  BP(mean): 78 (09 Feb 2024 09:00) (78 - 100)  RR: 27 (09 Feb 2024 10:00) (6 - 28)  SpO2: 95% (09 Feb 2024 10:00) (95% - 98%)    Parameters below as of 09 Feb 2024 08:00  Patient On (Oxygen Delivery Method): room air    
Vital Signs Last 24 Hrs  T(C): 36.1 (10 Feb 2024 05:17), Max: 36.9 (09 Feb 2024 20:30)  T(F): 97 (10 Feb 2024 05:17), Max: 98.5 (09 Feb 2024 20:30)  HR: 67 (10 Feb 2024 05:17) (67 - 98)  BP: 107/61 (10 Feb 2024 05:17) (107/61 - 114/64)  BP(mean): --  RR: 18 (09 Feb 2024 20:30) (18 - 18)  SpO2: 98% (09 Feb 2024 20:30) (98% - 98%)    Parameters below as of 09 Feb 2024 20:30  Patient On (Oxygen Delivery Method): room air    
Vital Signs Last 24 Hrs  T(C): 36.6 (09 Feb 2024 08:00), Max: 36.6 (09 Feb 2024 08:00)  T(F): 97.8 (09 Feb 2024 08:00), Max: 97.8 (09 Feb 2024 08:00)  HR: 100 (09 Feb 2024 10:00) (62 - 101)  BP: 102/66 (09 Feb 2024 09:00) (102/66 - 132/79)  BP(mean): 78 (09 Feb 2024 09:00) (78 - 100)  RR: 27 (09 Feb 2024 10:00) (6 - 28)  SpO2: 95% (09 Feb 2024 10:00) (95% - 98%)    Parameters below as of 09 Feb 2024 08:00  Patient On (Oxygen Delivery Method): room air    
Vital Signs Last 24 Hrs  T(C): 36.8 (11 Feb 2024 14:07), Max: 36.8 (11 Feb 2024 14:07)  T(F): 98.3 (11 Feb 2024 14:07), Max: 98.3 (11 Feb 2024 14:07)  HR: 102 (11 Feb 2024 14:07) (67 - 102)  BP: 127/79 (11 Feb 2024 14:07) (108/66 - 127/79)  BP(mean): 78 (11 Feb 2024 03:52) (78 - 82)  RR: 18 (11 Feb 2024 03:52) (18 - 18)  SpO2: 98% (11 Feb 2024 03:52) (98% - 98%)    Parameters below as of 11 Feb 2024 03:52  Patient On (Oxygen Delivery Method): room air

## 2024-02-11 NOTE — PROGRESS NOTE ADULT - ASSESSMENT
28 year old with hx of dm type 1, sp od with insulin on 2/7,   endo and psychiatrist consults appreciated  insulin adjusted  dw pshyciatry team, pt is medically stable but requires pshyciatric inpt care and treatment,   dc to [facility]     # Misc  - DVT Prophylaxis: SCDs, lovenox 40 mg  - Diet: Regular   - Code Status: Full code   -Dispo: Floor     PENDINGS:  pending [facility] bed  28 year old with hx of dm type 1, sp od with insulin on 2/7,   endo and psychiatrist consults appreciated  insulin adjusted  dw psychiatry team, pt is medically stable but requires psychiatric inpt care and treatment,   dc to [facility]     # Misc  - DVT Prophylaxis: SCDs, lovenox 40 mg  - Diet: Regular   - Code Status: Full code   -Dispo: Floor     PENDINGS:  IPP bed available tomorrow 2/12 for early morning dc. discharge paperwork prepped.

## 2024-02-11 NOTE — PROGRESS NOTE ADULT - SUBJECTIVE AND OBJECTIVE BOX
SUBJECTIVE:    Patient is a 28y old Male who presents with a chief complaint of Medication OD (10 Feb 2024 12:39)    Currently admitted to medicine with the primary diagnosis of:    Today is hospital day 3d.     Overnight Events:     patient doing well.. sugar elevated    PAST MEDICAL & SURGICAL HISTORY  Type 1 diabetes mellitus  Diagnosed in , managed by Dr. Johnston    Hypercholesterolemia  currently on Lipitor       ALLERGIES:  No Known Allergies    MEDICATIONS:  STANDING MEDICATIONS  chlorhexidine 2% Cloths 1 Application(s) Topical <User Schedule>  dextrose 5%. 1000 milliLiter(s) IV Continuous <Continuous>  dextrose 5%. 1000 milliLiter(s) IV Continuous <Continuous>  dextrose 50% Injectable 25 Gram(s) IV Push once  dextrose 50% Injectable 25 Gram(s) IV Push once  dextrose 50% Injectable 12.5 Gram(s) IV Push once  enoxaparin Injectable 40 milliGRAM(s) SubCutaneous every 24 hours  glucagon  Injectable 1 milliGRAM(s) IntraMuscular once  influenza   Vaccine 0.5 milliLiter(s) IntraMuscular once  insulin glargine Injectable (LANTUS) 22 Unit(s) SubCutaneous at bedtime  insulin lispro (ADMELOG) corrective regimen sliding scale   SubCutaneous three times a day before meals  insulin lispro Injectable (ADMELOG) 8 Unit(s) SubCutaneous three times a day before meals    PRN MEDICATIONS  acetaminophen     Tablet .. 650 milliGRAM(s) Oral every 6 hours PRN  aluminum hydroxide/magnesium hydroxide/simethicone Suspension 30 milliLiter(s) Oral every 4 hours PRN  dextrose Oral Gel 15 Gram(s) Oral once PRN  haloperidol     Tablet 2 milliGRAM(s) Oral every 6 hours PRN  hydrOXYzine hydrochloride 50 milliGRAM(s) Oral every 6 hours PRN  melatonin 3 milliGRAM(s) Oral at bedtime PRN  ondansetron Injectable 4 milliGRAM(s) IV Push every 8 hours PRN    VITALS:   ICU Vital Signs Last 24 Hrs  T(C): 36.1 (2024 03:52), Max: 37 (10 Feb 2024 12:00)  T(F): 96.9 (2024 03:52), Max: 98.6 (10 Feb 2024 12:00)  HR: 67 (2024 03:52) (67 - 97)  BP: 118/67 (2024 03:52) (108/66 - 118/67)  BP(mean): 78 (2024 03:52) (78 - 82)     RR: 18 (2024 03:52) (18 - 19)  SpO2: 98% (2024 03:52) (96% - 98%)    O2 Parameters below as of 2024 03:52  Patient On (Oxygen Delivery Method): room air            LABS:            Urinalysis Basic - ( 2024 23:02 )    Color: Yellow / Appearance: Clear / S.026 / pH: x  Gluc: x / Ketone: Negative mg/dL  / Bili: Negative / Urobili: 0.2 mg/dL   Blood: x / Protein: Negative mg/dL / Nitrite: Negative   Leuk Esterase: Negative / RBC: x / WBC x   Sq Epi: x / Non Sq Epi: x / Bacteria: x                  RADIOLOGY:    PHYSICAL EXAM:    GENERAL: NAD, lying in bed comfortably  CHEST/LUNG: Clear to auscultation bilaterally; No rales, rhonchi, wheezing, or rubs. Unlabored respirations  HEART: Regular rate and rhythm; No murmurs, rubs, or gallops  ABDOMEN: Bowel sounds present; Soft, Nontender, Nondistended. No hepatomegally  EXTREMITIES:  2+ Peripheral Pulses, brisk capillary refill. No clubbing, cyanosis, or edema  NERVOUS SYSTEM:  Alert & Oriented X3, speech clear. No deficits   MSK: FROM all 4 extremities, full and equal strength  SKIN: No rashes or lesions

## 2024-02-11 NOTE — BH CONSULTATION LIAISON PROGRESS NOTE - NSBHCONSULTPRIMARYDISCUSSYES_PSY_A_CORE FT
above recommendations discussed with medical resident on the case 
above recommendations discussed with medical resident on the case 
Above recommendations discussed with medical resident and nursing staff on the case.

## 2024-02-11 NOTE — BH CONSULTATION LIAISON PROGRESS NOTE - CURRENT MEDICATION
MEDICATIONS  (STANDING):  chlorhexidine 2% Cloths 1 Application(s) Topical <User Schedule>  dextrose 5%. 1000 milliLiter(s) (50 mL/Hr) IV Continuous <Continuous>  dextrose 5%. 1000 milliLiter(s) (100 mL/Hr) IV Continuous <Continuous>  dextrose 50% Injectable 25 Gram(s) IV Push once  dextrose 50% Injectable 12.5 Gram(s) IV Push once  dextrose 50% Injectable 25 Gram(s) IV Push once  enoxaparin Injectable 40 milliGRAM(s) SubCutaneous every 24 hours  glucagon  Injectable 1 milliGRAM(s) IntraMuscular once  influenza   Vaccine 0.5 milliLiter(s) IntraMuscular once  insulin glargine Injectable (LANTUS) 20 Unit(s) SubCutaneous every morning  insulin lispro (ADMELOG) corrective regimen sliding scale   SubCutaneous three times a day before meals  insulin lispro Injectable (ADMELOG) 6 Unit(s) SubCutaneous three times a day before meals    MEDICATIONS  (PRN):  acetaminophen     Tablet .. 650 milliGRAM(s) Oral every 6 hours PRN Temp greater or equal to 38C (100.4F), Mild Pain (1 - 3)  aluminum hydroxide/magnesium hydroxide/simethicone Suspension 30 milliLiter(s) Oral every 4 hours PRN Dyspepsia  dextrose Oral Gel 15 Gram(s) Oral once PRN Blood Glucose LESS THAN 70 milliGRAM(s)/deciliter  haloperidol     Tablet 2 milliGRAM(s) Oral every 6 hours PRN Agitation  hydrOXYzine hydrochloride 50 milliGRAM(s) Oral every 6 hours PRN Agitation  melatonin 3 milliGRAM(s) Oral at bedtime PRN Insomnia  ondansetron Injectable 4 milliGRAM(s) IV Push every 8 hours PRN Nausea and/or Vomiting  
MEDICATIONS  (STANDING):  chlorhexidine 2% Cloths 1 Application(s) Topical <User Schedule>  dextrose 5%. 1000 milliLiter(s) (50 mL/Hr) IV Continuous <Continuous>  dextrose 5%. 1000 milliLiter(s) (100 mL/Hr) IV Continuous <Continuous>  dextrose 50% Injectable 25 Gram(s) IV Push once  dextrose 50% Injectable 25 Gram(s) IV Push once  dextrose 50% Injectable 12.5 Gram(s) IV Push once  enoxaparin Injectable 40 milliGRAM(s) SubCutaneous every 24 hours  glucagon  Injectable 1 milliGRAM(s) IntraMuscular once  influenza   Vaccine 0.5 milliLiter(s) IntraMuscular once  insulin glargine Injectable (LANTUS) 20 Unit(s) SubCutaneous every morning  insulin lispro (ADMELOG) corrective regimen sliding scale   SubCutaneous three times a day before meals  insulin lispro Injectable (ADMELOG) 6 Unit(s) SubCutaneous three times a day before meals    MEDICATIONS  (PRN):  acetaminophen     Tablet .. 650 milliGRAM(s) Oral every 6 hours PRN Temp greater or equal to 38C (100.4F), Mild Pain (1 - 3)  aluminum hydroxide/magnesium hydroxide/simethicone Suspension 30 milliLiter(s) Oral every 4 hours PRN Dyspepsia  dextrose Oral Gel 15 Gram(s) Oral once PRN Blood Glucose LESS THAN 70 milliGRAM(s)/deciliter  haloperidol     Tablet 2 milliGRAM(s) Oral every 6 hours PRN Agitation  hydrOXYzine hydrochloride 50 milliGRAM(s) Oral every 6 hours PRN Agitation  melatonin 3 milliGRAM(s) Oral at bedtime PRN Insomnia  ondansetron Injectable 4 milliGRAM(s) IV Push every 8 hours PRN Nausea and/or Vomiting  
MEDICATIONS  (STANDING):  chlorhexidine 2% Cloths 1 Application(s) Topical <User Schedule>  dextrose 5%. 1000 milliLiter(s) (100 mL/Hr) IV Continuous <Continuous>  dextrose 5%. 1000 milliLiter(s) (50 mL/Hr) IV Continuous <Continuous>  dextrose 50% Injectable 25 Gram(s) IV Push once  dextrose 50% Injectable 12.5 Gram(s) IV Push once  dextrose 50% Injectable 25 Gram(s) IV Push once  enoxaparin Injectable 40 milliGRAM(s) SubCutaneous every 24 hours  glucagon  Injectable 1 milliGRAM(s) IntraMuscular once  influenza   Vaccine 0.5 milliLiter(s) IntraMuscular once  insulin glargine Injectable (LANTUS) 20 Unit(s) SubCutaneous every morning  insulin lispro (ADMELOG) corrective regimen sliding scale   SubCutaneous three times a day before meals  insulin lispro Injectable (ADMELOG) 8 Unit(s) SubCutaneous three times a day before meals    MEDICATIONS  (PRN):  acetaminophen     Tablet .. 650 milliGRAM(s) Oral every 6 hours PRN Temp greater or equal to 38C (100.4F), Mild Pain (1 - 3)  aluminum hydroxide/magnesium hydroxide/simethicone Suspension 30 milliLiter(s) Oral every 4 hours PRN Dyspepsia  dextrose Oral Gel 15 Gram(s) Oral once PRN Blood Glucose LESS THAN 70 milliGRAM(s)/deciliter  haloperidol     Tablet 2 milliGRAM(s) Oral every 6 hours PRN Agitation  hydrOXYzine hydrochloride 50 milliGRAM(s) Oral every 6 hours PRN Agitation  melatonin 3 milliGRAM(s) Oral at bedtime PRN Insomnia  ondansetron Injectable 4 milliGRAM(s) IV Push every 8 hours PRN Nausea and/or Vomiting  
MEDICATIONS  (STANDING):  chlorhexidine 2% Cloths 1 Application(s) Topical <User Schedule>  dextrose 5%. 1000 milliLiter(s) (50 mL/Hr) IV Continuous <Continuous>  dextrose 5%. 1000 milliLiter(s) (100 mL/Hr) IV Continuous <Continuous>  dextrose 50% Injectable 12.5 Gram(s) IV Push once  dextrose 50% Injectable 25 Gram(s) IV Push once  dextrose 50% Injectable 25 Gram(s) IV Push once  enoxaparin Injectable 40 milliGRAM(s) SubCutaneous every 24 hours  glucagon  Injectable 1 milliGRAM(s) IntraMuscular once  influenza   Vaccine 0.5 milliLiter(s) IntraMuscular once  insulin glargine Injectable (LANTUS) 22 Unit(s) SubCutaneous every morning  insulin lispro (ADMELOG) corrective regimen sliding scale   SubCutaneous three times a day before meals  insulin lispro Injectable (ADMELOG) 8 Unit(s) SubCutaneous three times a day before meals  sertraline 50 milliGRAM(s) Oral daily    MEDICATIONS  (PRN):  acetaminophen     Tablet .. 650 milliGRAM(s) Oral every 6 hours PRN Temp greater or equal to 38C (100.4F), Mild Pain (1 - 3)  aluminum hydroxide/magnesium hydroxide/simethicone Suspension 30 milliLiter(s) Oral every 4 hours PRN Dyspepsia  dextrose Oral Gel 15 Gram(s) Oral once PRN Blood Glucose LESS THAN 70 milliGRAM(s)/deciliter  haloperidol     Tablet 2 milliGRAM(s) Oral every 6 hours PRN Agitation  hydrOXYzine hydrochloride 50 milliGRAM(s) Oral every 6 hours PRN Agitation  melatonin 3 milliGRAM(s) Oral at bedtime PRN Insomnia  ondansetron Injectable 4 milliGRAM(s) IV Push every 8 hours PRN Nausea and/or Vomiting

## 2024-02-11 NOTE — BH CONSULTATION LIAISON PROGRESS NOTE - NSBHCONSULTRECOMMENDOTHER_PSY_A_CORE FT
1. We recommend continuing Zoloft 50mg P.O Q AM after meals.  2. We recommend melatonin 5 –10 mg P.O bedtime to regulate sleep wake cycle   3. There is no acute indication for the use of ADHD medication for now   4. We recommend behavioral interventions, and environmental modifications to help patient's orientation and help her feel safe in addition to implementing delirium precautions as per nursing staff.  5. Patient will need inpatient psychiatry hospitalization upon bed availability.   6. The  psychiatry team will follow.

## 2024-02-11 NOTE — BH CONSULTATION LIAISON PROGRESS NOTE - NSBHATTESTBILLING_PSY_A_CORE
76135-Mbeorhnthx OBS or IP - low complexity OR 25-34 mins
33194-Ufkdpufgcg OBS or IP - moderate complexity OR 35-49 mins
47498-Ccewrrdwmm OBS or IP - moderate complexity OR 35-49 mins
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
## 2024-02-11 NOTE — PROGRESS NOTE ADULT - TIME BILLING
Patient seen at bedside, time spent evaluating and treating the patient's acute illness as well as time spent reviewing labs, radiology, discussing with patient and/or patient's family

## 2024-02-11 NOTE — BH CONSULTATION LIAISON PROGRESS NOTE - NSBHMSEMUSCLE_PSY_A_CORE
Normal muscle tone/strength
decreased ability to use arms for pushing/pulling/decreased ability to use legs for bridging/pushing

## 2024-02-11 NOTE — BH CONSULTATION LIAISON PROGRESS NOTE - NSBHCHARTREVIEWLAB_PSY_A_CORE FT
14.2   5.33  )-----------( 281      ( 09 Feb 2024 04:23 )             41.7       02-09    134<L>  |  100  |  18  ----------------------------<  317<H>  4.3   |  24  |  0.7    Ca    8.7      09 Feb 2024 04:23  Phos  4.3     02-09  Mg     1.9     02-09    TPro  6.6  /  Alb  4.2  /  TBili  0.4  /  DBili  x   /  AST  20  /  ALT  24  /  AlkPhos  85  02-08  
                      14.2   5.33  )-----------( 281      ( 09 Feb 2024 04:23 )             41.7       02-09    134<L>  |  100  |  18  ----------------------------<  317<H>  4.3   |  24  |  0.7    Ca    8.7      09 Feb 2024 04:23  Phos  4.3     02-09  Mg     1.9     02-09    TPro  6.6  /  Alb  4.2  /  TBili  0.4  /  DBili  x   /  AST  20  /  ALT  24  /  AlkPhos  85  02-08  
beer
                      14.2   5.33  )-----------( 281      ( 09 Feb 2024 04:23 )             41.7       02-09    134<L>  |  100  |  18  ----------------------------<  317<H>  4.3   |  24  |  0.7    Ca    8.7      09 Feb 2024 04:23  Phos  4.3     02-09  Mg     1.9     02-09    TPro  6.6  /  Alb  4.2  /  TBili  0.4  /  DBili  x   /  AST  20  /  ALT  24  /  AlkPhos  85  02-08

## 2024-02-11 NOTE — BH CONSULTATION LIAISON PROGRESS NOTE - NSBHFUPINTERVALHXFT_PSY_A_CORE
Pt was administered melatonin overnight.     Chart reviewed, patient seen and evaluated in AM. On approach, patient appears dejected, depressed, and withdrawn. Pt reports he is growing increasingly down waiting for the next stage of his treatment and feels depressed. Discussed in detail pt's thoughts about ADHD and depression, as well as therapy and pharmacological treatment for his Sx. Pt is tolerating zoloft well without AE. Reports complying with other medications, including insulin. Patient denies suicidal ideation, intent or plan. Denied auditory or visual hallucinations. Denied paranoia. Remains hopeful in making progress on the inpatient unit.

## 2024-02-12 ENCOUNTER — TRANSCRIPTION ENCOUNTER (OUTPATIENT)
Age: 29
End: 2024-02-12

## 2024-02-12 ENCOUNTER — INPATIENT (INPATIENT)
Facility: HOSPITAL | Age: 29
LOS: 16 days | Discharge: HOME | DRG: 751 | End: 2024-02-29
Attending: PSYCHIATRY & NEUROLOGY | Admitting: PSYCHIATRY & NEUROLOGY
Payer: MEDICAID

## 2024-02-12 VITALS
DIASTOLIC BLOOD PRESSURE: 74 MMHG | TEMPERATURE: 96 F | HEART RATE: 102 BPM | WEIGHT: 142.64 LBS | SYSTOLIC BLOOD PRESSURE: 123 MMHG | HEIGHT: 69 IN

## 2024-02-12 VITALS — OXYGEN SATURATION: 98 %

## 2024-02-12 DIAGNOSIS — F32.A DEPRESSION, UNSPECIFIED: ICD-10-CM

## 2024-02-12 DIAGNOSIS — F41.9 ANXIETY DISORDER, UNSPECIFIED: ICD-10-CM

## 2024-02-12 LAB
GLUCOSE BLDC GLUCOMTR-MCNC: 129 MG/DL — HIGH (ref 70–99)
GLUCOSE BLDC GLUCOMTR-MCNC: 150 MG/DL — HIGH (ref 70–99)
GLUCOSE BLDC GLUCOMTR-MCNC: 204 MG/DL — HIGH (ref 70–99)
GLUCOSE BLDC GLUCOMTR-MCNC: 217 MG/DL — HIGH (ref 70–99)
GLUCOSE BLDC GLUCOMTR-MCNC: 260 MG/DL — HIGH (ref 70–99)
SARS-COV-2 RNA SPEC QL NAA+PROBE: SIGNIFICANT CHANGE UP

## 2024-02-12 PROCEDURE — 99232 SBSQ HOSP IP/OBS MODERATE 35: CPT

## 2024-02-12 PROCEDURE — 84443 ASSAY THYROID STIM HORMONE: CPT

## 2024-02-12 PROCEDURE — 82962 GLUCOSE BLOOD TEST: CPT

## 2024-02-12 PROCEDURE — ZZZZZ: CPT

## 2024-02-12 RX ORDER — SERTRALINE 25 MG/1
100 TABLET, FILM COATED ORAL DAILY
Refills: 0 | Status: ACTIVE | OUTPATIENT
Start: 2024-02-13 | End: 2025-01-11

## 2024-02-12 RX ORDER — SODIUM CHLORIDE 9 MG/ML
1000 INJECTION, SOLUTION INTRAVENOUS
Refills: 0 | Status: DISCONTINUED | OUTPATIENT
Start: 2024-02-12 | End: 2024-02-29

## 2024-02-12 RX ORDER — DEXTROSE 50 % IN WATER 50 %
15 SYRINGE (ML) INTRAVENOUS ONCE
Refills: 0 | Status: DISCONTINUED | OUTPATIENT
Start: 2024-02-12 | End: 2024-02-29

## 2024-02-12 RX ORDER — SERTRALINE 25 MG/1
50 TABLET, FILM COATED ORAL DAILY
Refills: 0 | Status: DISCONTINUED | OUTPATIENT
Start: 2024-02-12 | End: 2024-02-12

## 2024-02-12 RX ORDER — HYDROXYZINE HCL 10 MG
50 TABLET ORAL EVERY 6 HOURS
Refills: 0 | Status: DISCONTINUED | OUTPATIENT
Start: 2024-02-12 | End: 2024-02-29

## 2024-02-12 RX ORDER — INSULIN LISPRO 100/ML
8 VIAL (ML) SUBCUTANEOUS
Refills: 0 | Status: DISCONTINUED | OUTPATIENT
Start: 2024-02-12 | End: 2024-02-28

## 2024-02-12 RX ORDER — DEXTROSE 50 % IN WATER 50 %
12.5 SYRINGE (ML) INTRAVENOUS ONCE
Refills: 0 | Status: DISCONTINUED | OUTPATIENT
Start: 2024-02-12 | End: 2024-02-29

## 2024-02-12 RX ORDER — DEXTROSE 50 % IN WATER 50 %
25 SYRINGE (ML) INTRAVENOUS ONCE
Refills: 0 | Status: DISCONTINUED | OUTPATIENT
Start: 2024-02-12 | End: 2024-02-29

## 2024-02-12 RX ORDER — TRAZODONE HCL 50 MG
50 TABLET ORAL AT BEDTIME
Refills: 0 | Status: DISCONTINUED | OUTPATIENT
Start: 2024-02-12 | End: 2024-02-21

## 2024-02-12 RX ORDER — INSULIN LISPRO 100/ML
VIAL (ML) SUBCUTANEOUS
Refills: 0 | Status: DISCONTINUED | OUTPATIENT
Start: 2024-02-12 | End: 2024-02-29

## 2024-02-12 RX ORDER — INSULIN GLARGINE 100 [IU]/ML
22 INJECTION, SOLUTION SUBCUTANEOUS EVERY MORNING
Refills: 0 | Status: DISCONTINUED | OUTPATIENT
Start: 2024-02-12 | End: 2024-02-20

## 2024-02-12 RX ORDER — HALOPERIDOL DECANOATE 100 MG/ML
5 INJECTION INTRAMUSCULAR EVERY 6 HOURS
Refills: 0 | Status: DISCONTINUED | OUTPATIENT
Start: 2024-02-12 | End: 2024-02-29

## 2024-02-12 RX ORDER — GLUCAGON INJECTION, SOLUTION 0.5 MG/.1ML
1 INJECTION, SOLUTION SUBCUTANEOUS ONCE
Refills: 0 | Status: DISCONTINUED | OUTPATIENT
Start: 2024-02-12 | End: 2024-02-29

## 2024-02-12 RX ADMIN — CHLORHEXIDINE GLUCONATE 1 APPLICATION(S): 213 SOLUTION TOPICAL at 05:54

## 2024-02-12 RX ADMIN — Medication 8 UNIT(S): at 16:23

## 2024-02-12 RX ADMIN — SERTRALINE 50 MILLIGRAM(S): 25 TABLET, FILM COATED ORAL at 11:19

## 2024-02-12 RX ADMIN — Medication 8 UNIT(S): at 12:20

## 2024-02-12 RX ADMIN — Medication 6: at 08:22

## 2024-02-12 RX ADMIN — Medication 8 UNIT(S): at 08:23

## 2024-02-12 RX ADMIN — INSULIN GLARGINE 22 UNIT(S): 100 INJECTION, SOLUTION SUBCUTANEOUS at 08:23

## 2024-02-12 NOTE — BH INPATIENT PSYCHIATRY ASSESSMENT NOTE - DESCRIPTION
Patient reports that he grew up in [place], completed high school . 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 he reports after he got better , the refused to let him back in school as it was considered that he dropped out .

## 2024-02-12 NOTE — BH INPATIENT PSYCHIATRY ASSESSMENT NOTE - NSBHCHARTREVIEWVS_PSY_A_CORE FT
Vital Signs Last 24 Hrs  T(C): 36.4 (02-12-24 @ 05:34), Max: 36.4 (02-12-24 @ 05:34)  T(F): 97.6 (02-12-24 @ 05:34), Max: 97.6 (02-12-24 @ 05:34)  HR: 79 (02-12-24 @ 05:34) (79 - 79)  BP: 108/68 (02-12-24 @ 05:34) (108/68 - 108/68)  BP(mean): --  RR: 17 (02-12-24 @ 05:34) (17 - 17)  SpO2: 98% (02-12-24 @ 08:04) (98% - 98%)     Vital Signs Last 24 Hrs  T(C): 35.8 (02-12-24 @ 14:55), Max: 36.4 (02-12-24 @ 05:34)  T(F): 96.4 (02-12-24 @ 14:55), Max: 97.6 (02-12-24 @ 05:34)  HR: 102 (02-12-24 @ 14:55) (79 - 102)  BP: 123/74 (02-12-24 @ 14:55) (108/68 - 123/74)  BP(mean): --  RR: 17 (02-12-24 @ 05:34) (17 - 17)  SpO2: 98% (02-12-24 @ 08:04) (98% - 98%)
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
## 2024-02-12 NOTE — DISCHARGE NOTE NURSING/CASE MANAGEMENT/SOCIAL WORK - PATIENT PORTAL LINK FT
You can access the FollowMyHealth Patient Portal offered by [facility] by registering at the following website: [email or web address]. By joining [facility]’s FollowMyHealth portal, you will also be able to view your health information using other applications (apps) compatible with our system.
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
## 2024-02-12 NOTE — BH INPATIENT PSYCHIATRY ASSESSMENT NOTE - CURRENT MEDICATION
MEDICATIONS  (STANDING):  dextrose 5%. 1000 milliLiter(s) (100 mL/Hr) IV Continuous <Continuous>  dextrose 5%. 1000 milliLiter(s) (50 mL/Hr) IV Continuous <Continuous>  dextrose 50% Injectable 12.5 Gram(s) IV Push once  dextrose 50% Injectable 25 Gram(s) IV Push once  dextrose 50% Injectable 25 Gram(s) IV Push once  glucagon  Injectable 1 milliGRAM(s) IntraMuscular once  insulin glargine Injectable (LANTUS) 22 Unit(s) SubCutaneous every morning  insulin lispro (ADMELOG) corrective regimen sliding scale   SubCutaneous three times a day before meals  insulin lispro Injectable (ADMELOG) 8 Unit(s) SubCutaneous three times a day before meals  sertraline 50 milliGRAM(s) Oral daily    MEDICATIONS  (PRN):  dextrose Oral Gel 15 Gram(s) Oral once PRN Blood Glucose LESS THAN 70 milliGRAM(s)/deciliter   MEDICATIONS  (STANDING):  dextrose 5%. 1000 milliLiter(s) (100 mL/Hr) IV Continuous <Continuous>  dextrose 5%. 1000 milliLiter(s) (50 mL/Hr) IV Continuous <Continuous>  dextrose 50% Injectable 12.5 Gram(s) IV Push once  dextrose 50% Injectable 25 Gram(s) IV Push once  dextrose 50% Injectable 25 Gram(s) IV Push once  glucagon  Injectable 1 milliGRAM(s) IntraMuscular once  insulin glargine Injectable (LANTUS) 22 Unit(s) SubCutaneous every morning  insulin lispro (ADMELOG) corrective regimen sliding scale   SubCutaneous three times a day before meals  insulin lispro Injectable (ADMELOG) 8 Unit(s) SubCutaneous three times a day before meals    MEDICATIONS  (PRN):  dextrose Oral Gel 15 Gram(s) Oral once PRN Blood Glucose LESS THAN 70 milliGRAM(s)/deciliter  haloperidol     Tablet 5 milliGRAM(s) Oral every 6 hours PRN agitation  hydrOXYzine hydrochloride 50 milliGRAM(s) Oral every 6 hours PRN anxiety  LORazepam     Tablet 2 milliGRAM(s) Oral every 6 hours PRN combative bx  traZODone 50 milliGRAM(s) Oral at bedtime PRN insomnia
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
## 2024-02-12 NOTE — BH INPATIENT PSYCHIATRY ASSESSMENT NOTE - NSBHCHARTREVIEWINVESTIGATE_PSY_A_CORE FT
< from: 12 Lead ECG (02.07.24 @ 22:12) >      Ventricular Rate 94 BPM    Atrial Rate 94 BPM    P-R Interval 130 ms    QRS Duration 70 ms    Q-T Interval 330 ms    QTC Calculation(Bazett) 412 ms    P Axis 86 degrees    R Axis 84 degrees    T Axis 78 degrees    Diagnosis Line Normal sinus rhythm  Right atrial enlargement  Early repolarization  Borderline ECG    < end of copied text >

## 2024-02-12 NOTE — BH PATIENT PROFILE - FALL HARM RISK - UNIVERSAL INTERVENTIONS
Bed in lowest position, wheels locked, appropriate side rails in place/Call bell, personal items and telephone in reach/Instruct patient to call for assistance before getting out of bed or chair/Non-slip footwear when patient is out of bed/[place] to call system/Physically safe environment - no spills, clutter or unnecessary equipment/Purposeful Proactive Rounding/Room/bathroom lighting operational, light cord in reach

## 2024-02-12 NOTE — BH INPATIENT PSYCHIATRY ASSESSMENT NOTE - NSBHATTESTBILLING_PSY_A_CORE
48297-Hbxnjzdlpo OBS or IP - moderate complexity OR 35-49 mins 03315-Hsufzzfayvb diagnostic evaluation with medical services

## 2024-02-12 NOTE — BH INPATIENT PSYCHIATRY ASSESSMENT NOTE - NSBHATTESTAPPBILLTIME_PSY_A_CORE
I attest my time as JAZMIN is greater than 50% of the total combined time spent on qualifying patient care activities. I have reviewed and verified the documentation.

## 2024-02-12 NOTE — BH INPATIENT PSYCHIATRY ASSESSMENT NOTE - NSBHMETABOLIC_PSY_ALL_CORE_FT
BMI: BMI (kg/m2): 19.6 (02-12-24 @ 14:07)  HbA1c: A1C with Estimated Average Glucose Result: 10.8 % (02-09-24 @ 04:23)    Glucose: POCT Blood Glucose.: 204 mg/dL (02-12-24 @ 12:17)    BP: --Vital Signs Last 24 Hrs  T(C): 36.4 (02-12-24 @ 05:34), Max: 36.4 (02-12-24 @ 05:34)  T(F): 97.6 (02-12-24 @ 05:34), Max: 97.6 (02-12-24 @ 05:34)  HR: 79 (02-12-24 @ 05:34) (79 - 79)  BP: 108/68 (02-12-24 @ 05:34) (108/68 - 108/68)  BP(mean): --  RR: 17 (02-12-24 @ 05:34) (17 - 17)  SpO2: 98% (02-12-24 @ 08:04) (98% - 98%)      Lipid Panel: Date/Time: 02-08-24 @ 04:30  Cholesterol, Serum: 191  LDL Cholesterol Calculated: 91  HDL Cholesterol, Serum: 49  Total Cholesterol/HDL Ration Measurement: --  Triglycerides, Serum: 252   BMI: BMI (kg/m2): 21.1 (02-12-24 @ 14:55)  HbA1c: A1C with Estimated Average Glucose Result: 10.8 % (02-09-24 @ 04:23)    Glucose: POCT Blood Glucose.: 217 mg/dL (02-12-24 @ 14:20)    BP: 123/74 (02-12-24 @ 14:55) (123/74 - 123/74)Vital Signs Last 24 Hrs  T(C): 35.8 (02-12-24 @ 14:55), Max: 36.4 (02-12-24 @ 05:34)  T(F): 96.4 (02-12-24 @ 14:55), Max: 97.6 (02-12-24 @ 05:34)  HR: 102 (02-12-24 @ 14:55) (79 - 102)  BP: 123/74 (02-12-24 @ 14:55) (108/68 - 123/74)  BP(mean): --  RR: 17 (02-12-24 @ 05:34) (17 - 17)  SpO2: 98% (02-12-24 @ 08:04) (98% - 98%)      Lipid Panel: Date/Time: 02-08-24 @ 04:30  Cholesterol, Serum: 191  LDL Cholesterol Calculated: 91  HDL Cholesterol, Serum: 49  Total Cholesterol/HDL Ration Measurement: --  Triglycerides, Serum: 252

## 2024-02-13 LAB
GLUCOSE BLDC GLUCOMTR-MCNC: 161 MG/DL — HIGH (ref 70–99)
GLUCOSE BLDC GLUCOMTR-MCNC: 198 MG/DL — HIGH (ref 70–99)
GLUCOSE BLDC GLUCOMTR-MCNC: 237 MG/DL — HIGH (ref 70–99)
GLUCOSE BLDC GLUCOMTR-MCNC: 288 MG/DL — HIGH (ref 70–99)
GLUCOSE BLDC GLUCOMTR-MCNC: 330 MG/DL — HIGH (ref 70–99)
TSH SERPL-MCNC: 1.03 UIU/ML — SIGNIFICANT CHANGE UP (ref 0.27–4.2)

## 2024-02-13 PROCEDURE — 99231 SBSQ HOSP IP/OBS SF/LOW 25: CPT

## 2024-02-13 RX ORDER — INSULIN LISPRO 100/ML
3 VIAL (ML) SUBCUTANEOUS ONCE
Refills: 0 | Status: COMPLETED | OUTPATIENT
Start: 2024-02-13 | End: 2024-02-13

## 2024-02-13 RX ADMIN — Medication 8 UNIT(S): at 16:34

## 2024-02-13 RX ADMIN — INSULIN GLARGINE 22 UNIT(S): 100 INJECTION, SOLUTION SUBCUTANEOUS at 09:04

## 2024-02-13 RX ADMIN — Medication 3: at 16:49

## 2024-02-13 RX ADMIN — Medication 1: at 11:40

## 2024-02-13 RX ADMIN — Medication 2: at 08:05

## 2024-02-13 RX ADMIN — Medication 8 UNIT(S): at 08:02

## 2024-02-13 RX ADMIN — Medication 3 UNIT(S): at 18:38

## 2024-02-13 RX ADMIN — Medication 8 UNIT(S): at 11:40

## 2024-02-13 RX ADMIN — Medication 50 MILLIGRAM(S): at 21:40

## 2024-02-13 RX ADMIN — SERTRALINE 100 MILLIGRAM(S): 25 TABLET, FILM COATED ORAL at 08:04

## 2024-02-13 NOTE — BH INPATIENT PSYCHIATRY PROGRESS NOTE - NSBHATTESTBILLING_PSY_A_CORE
81391-Zlqhkakexy OBS or IP - moderate complexity OR 35-49 mins 80993-Xodrfyhkti OBS or IP - low complexity OR 25-34 mins

## 2024-02-13 NOTE — UM REPORT PROGRESS NOTE - NSUMRMPROVIDER_GEN_A_CORE FT
Ruy Spivey  -  [facility]  [phone]  [id number]    2-13 azalia campbell generated under  # [id number] clinicals faxed to  awaiting determination  Ruy Spivey  -  [facility]  [phone]  [id number]    2-13 spoke w Mila campbell generated under  # [id number] clinicals faxed to  awaiting determination   2/16/24 - SW assistant called - they said they never received the fax - we have confirmation of receipt. Fax was resent this morning - we are still waiting for determination.  Ruy Spivey  -  [facility]  [phone]  [id number]    2-13 spoke w Mila campbell generated under  # [id number] clinicals faxed to  awaiting determination   2/16/24 - SW assistant called - they said they never received the fax - we have confirmation of receipt. Fax was resent this morning - we are still waiting for determination.   2/20/24 Marielena- received VM from Faye [phone]. Patient is approved for 30 days. 2/12- 3/12. Review on 3/12.

## 2024-02-13 NOTE — CHART NOTE - NSCHARTNOTEFT_GEN_A_CORE
was informed by RB pt with elevated   FS before eating 330  pt received 11 units of insulin lispro 1 hour ago - 8 from preprandial and 3 from ISS    not a significant increase in insulin compared to before dinner    Plan:   will recheck FS in half hour to see trending was informed by RN pt with elevated   FS before eating 330  pt received 11 units of insulin lispro 1 hour ago - 8 from preprandial and 3 from ISS    not a significant increase in insulin compared to before dinner    Plan:   will recheck FS in half hour to see trending was informed by RN pt with elevated   FS before eating 330  pt received 11 units of insulin lispro 1 hour ago - 8 from preprandial and 3 from ISS    not a significant increase in insulin compared to before dinner    Plan:   will recheck FS in half hour to see trending    addendum   FS trending down 388 > 288  will monitor

## 2024-02-13 NOTE — BH INPATIENT PSYCHIATRY PROGRESS NOTE - NSBHMETABOLIC_PSY_ALL_CORE_FT
BMI: BMI (kg/m2): 21.1 (02-12-24 @ 14:55)  HbA1c: A1C with Estimated Average Glucose Result: 10.8 % (02-09-24 @ 04:23)    Glucose: POCT Blood Glucose.: 198 mg/dL (02-13-24 @ 11:23)    BP: 116/58 (02-13-24 @ 07:59) (116/58 - 123/74)Vital Signs Last 24 Hrs  T(C): 35.6 (02-13-24 @ 07:59), Max: 35.8 (02-12-24 @ 14:55)  T(F): 96 (02-13-24 @ 07:59), Max: 96.4 (02-12-24 @ 14:55)  HR: 99 (02-13-24 @ 07:59) (99 - 102)  BP: 116/58 (02-13-24 @ 07:59) (116/58 - 123/74)  BP(mean): --  RR: 16 (02-13-24 @ 07:59) (16 - 16)  SpO2: --      Lipid Panel: Date/Time: 02-08-24 @ 04:30  Cholesterol, Serum: 191  LDL Cholesterol Calculated: 91  HDL Cholesterol, Serum: 49  Total Cholesterol/HDL Ration Measurement: --  Triglycerides, Serum: 252

## 2024-02-13 NOTE — BH INPATIENT PSYCHIATRY PROGRESS NOTE - NSBHFUPINTERVALHXFT_PSY_A_CORE
Pt seen and evaluated, chart reviewed. As per nursing report, no acute events overnight, no PRNs. On evaluation, pt presents calm and cooperative, well-groomed. He reports he is "ok" "neutral". He reports he was better able to sleep last night and is looking forward to join groups/DBT today. He endorses continued ruminations on events leading to admission, is with improving insight and verbalizes importance of building coping skills.     Attempted to speak with pt's mother, Araceli ([phone]) - left VM and callback # Pt seen and evaluated, chart reviewed. As per nursing report, no acute events overnight, no PRNs. On evaluation, pt presents calm and cooperative, well-groomed. He reports he is "ok" "neutral". He reports he was better able to sleep last night and is looking forward to join groups/DBT today. He endorses continued ruminations on events leading to admission, is with improving insight and verbalizes importance of building coping skills. He reports improving appetite, requesting for double portions; RD consult pending. He denies AVH, paranoia. Denies SI/HI, intent and plan. He is adherent to medications, denies negative side effects. Visible on unit and in behavioral control.   Attempted to speak with pt's mother, Araceli ([phone]) - left VM and callback #

## 2024-02-13 NOTE — BH INPATIENT PSYCHIATRY PROGRESS NOTE - NSBHCHARTREVIEWVS_PSY_A_CORE FT
Vital Signs Last 24 Hrs  T(C): 35.6 (02-13-24 @ 07:59), Max: 35.8 (02-12-24 @ 14:55)  T(F): 96 (02-13-24 @ 07:59), Max: 96.4 (02-12-24 @ 14:55)  HR: 99 (02-13-24 @ 07:59) (99 - 102)  BP: 116/58 (02-13-24 @ 07:59) (116/58 - 123/74)  BP(mean): --  RR: 16 (02-13-24 @ 07:59) (16 - 16)  SpO2: --

## 2024-02-13 NOTE — BH INPATIENT PSYCHIATRY PROGRESS NOTE - NSBHASSESSSUMMFT_PSY_ALL_CORE
29 y/o male, domiciled with mother, employed, single, no children, PMH of DM1, and PPH of ADHD and an unclear mood disorder, who was admitted to the ICU following an intentional overdose on his insulin in suicide attempt. He presented with ongoing symptoms of depression and anxiety, had not been taking care of himself and likely his health in the weeks leading up to the suicide attempt. Even if the overdose was impulsive, patient's premorbid functioning give the concern for worsening depression and anxiety. In addition, it is likely that his childhood experience of neglect has contributed to a maladaptive manner of dealing with relationships and concern for abandonment and a desire to be loved. He therefore seems to have significant difficulty coping with perceived rejection causing worsening depression and anxiety. Pt will likely benefit from IPP at this time.    #MDD  -titrate zoloft 100 mg daily  -start trazodone 50 mg qhs prn for insomnia  -start hydroxyzine 50 mg q6h prn for anxiety  -encourage groups/DBT    #DM1  -hospitalist consult pending  -c/w lantus 22 units qAM, lispro 8 units tid with meals, SSI  -c/w FS achs, consistent carbs diet    #Agitation  -for agitation not amenable to verbal redirection, may give haldol 3 mg q6h prn and ativan 1 mg q6h prn with escalation to IM if pt is refusing PO and is an acute danger to self or/and others with repeat EKG to ensure QTc <500 ms 29 y/o male, domiciled with mother, employed, single, no children, PMH of DM1, and PPH of ADHD and an unclear mood disorder, who was admitted to the ICU following an intentional overdose on his insulin in suicide attempt. He presented with ongoing symptoms of depression and anxiety, had not been taking care of himself and likely his health in the weeks leading up to the suicide attempt. Even if the overdose was impulsive, patient's premorbid functioning give the concern for worsening depression and anxiety. In addition, it is likely that his childhood experience of neglect has contributed to a maladaptive manner of dealing with relationships and concern for abandonment and a desire to be loved. He therefore seems to have significant difficulty coping with perceived rejection causing worsening depression and anxiety. Pt will likely benefit from IPP at this time.    #MDD  -c/w zoloft 100 mg daily  -c/w trazodone 50 mg qhs prn for insomnia  -c/w hydroxyzine 50 mg q6h prn for anxiety  -encourage groups/DBT    #DM1  -hospitalist consult pending  -c/w lantus 22 units qAM, lispro 8 units tid with meals, SSI  -c/w FS achs, consistent carbs diet    #Agitation  -for agitation not amenable to verbal redirection, may give haldol 3 mg q6h prn and ativan 1 mg q6h prn with escalation to IM if pt is refusing PO and is an acute danger to self or/and others with repeat EKG to ensure QTc <500 ms

## 2024-02-13 NOTE — BH INPATIENT PSYCHIATRY PROGRESS NOTE - CURRENT MEDICATION
MEDICATIONS  (STANDING):  dextrose 5%. 1000 milliLiter(s) (100 mL/Hr) IV Continuous <Continuous>  dextrose 5%. 1000 milliLiter(s) (50 mL/Hr) IV Continuous <Continuous>  dextrose 50% Injectable 12.5 Gram(s) IV Push once  dextrose 50% Injectable 25 Gram(s) IV Push once  dextrose 50% Injectable 25 Gram(s) IV Push once  glucagon  Injectable 1 milliGRAM(s) IntraMuscular once  insulin glargine Injectable (LANTUS) 22 Unit(s) SubCutaneous every morning  insulin lispro (ADMELOG) corrective regimen sliding scale   SubCutaneous three times a day before meals  insulin lispro Injectable (ADMELOG) 8 Unit(s) SubCutaneous three times a day before meals  sertraline 100 milliGRAM(s) Oral daily    MEDICATIONS  (PRN):  dextrose Oral Gel 15 Gram(s) Oral once PRN Blood Glucose LESS THAN 70 milliGRAM(s)/deciliter  haloperidol     Tablet 5 milliGRAM(s) Oral every 6 hours PRN agitation  hydrOXYzine hydrochloride 50 milliGRAM(s) Oral every 6 hours PRN anxiety  LORazepam     Tablet 2 milliGRAM(s) Oral every 6 hours PRN combative bx  traZODone 50 milliGRAM(s) Oral at bedtime PRN insomnia   MEDICATIONS  (STANDING):  dextrose 5%. 1000 milliLiter(s) (50 mL/Hr) IV Continuous <Continuous>  dextrose 5%. 1000 milliLiter(s) (100 mL/Hr) IV Continuous <Continuous>  dextrose 50% Injectable 12.5 Gram(s) IV Push once  dextrose 50% Injectable 25 Gram(s) IV Push once  dextrose 50% Injectable 25 Gram(s) IV Push once  glucagon  Injectable 1 milliGRAM(s) IntraMuscular once  insulin glargine Injectable (LANTUS) 22 Unit(s) SubCutaneous every morning  insulin lispro (ADMELOG) corrective regimen sliding scale   SubCutaneous three times a day before meals  insulin lispro Injectable (ADMELOG) 8 Unit(s) SubCutaneous three times a day before meals  sertraline 100 milliGRAM(s) Oral daily    MEDICATIONS  (PRN):  dextrose Oral Gel 15 Gram(s) Oral once PRN Blood Glucose LESS THAN 70 milliGRAM(s)/deciliter  haloperidol     Tablet 5 milliGRAM(s) Oral every 6 hours PRN agitation  hydrOXYzine hydrochloride 50 milliGRAM(s) Oral every 6 hours PRN anxiety  LORazepam     Tablet 2 milliGRAM(s) Oral every 6 hours PRN combative bx  traZODone 50 milliGRAM(s) Oral at bedtime PRN insomnia

## 2024-02-14 LAB
GLUCOSE BLDC GLUCOMTR-MCNC: 134 MG/DL — HIGH (ref 70–99)
GLUCOSE BLDC GLUCOMTR-MCNC: 147 MG/DL — HIGH (ref 70–99)
GLUCOSE BLDC GLUCOMTR-MCNC: 238 MG/DL — HIGH (ref 70–99)
GLUCOSE BLDC GLUCOMTR-MCNC: 241 MG/DL — HIGH (ref 70–99)

## 2024-02-14 PROCEDURE — 99231 SBSQ HOSP IP/OBS SF/LOW 25: CPT

## 2024-02-14 RX ORDER — SERTRALINE 25 MG/1
150 TABLET, FILM COATED ORAL DAILY
Refills: 0 | Status: DISCONTINUED | OUTPATIENT
Start: 2024-02-15 | End: 2024-02-15

## 2024-02-14 RX ADMIN — SERTRALINE 100 MILLIGRAM(S): 25 TABLET, FILM COATED ORAL at 07:55

## 2024-02-14 RX ADMIN — INSULIN GLARGINE 22 UNIT(S): 100 INJECTION, SOLUTION SUBCUTANEOUS at 07:50

## 2024-02-14 RX ADMIN — Medication 8 UNIT(S): at 07:49

## 2024-02-14 RX ADMIN — Medication 2: at 07:50

## 2024-02-14 RX ADMIN — Medication 8 UNIT(S): at 11:40

## 2024-02-14 RX ADMIN — Medication 50 MILLIGRAM(S): at 21:55

## 2024-02-14 RX ADMIN — Medication 8 UNIT(S): at 16:53

## 2024-02-14 NOTE — BH INPATIENT PSYCHIATRY PROGRESS NOTE - NSBHCHARTREVIEWVS_PSY_A_CORE FT
Vital Signs Last 24 Hrs  T(C): 35.7 (02-14-24 @ 08:06), Max: 36 (02-13-24 @ 15:58)  T(F): 96.2 (02-14-24 @ 08:06), Max: 96.8 (02-13-24 @ 15:58)  HR: 91 (02-14-24 @ 08:06) (91 - 94)  BP: 113/67 (02-14-24 @ 08:06) (113/67 - 134/82)  BP(mean): --  RR: 16 (02-14-24 @ 08:06) (16 - 16)  SpO2: --

## 2024-02-14 NOTE — BH DISCHARGE NOTE NURSING/SOCIAL WORK/PSYCH REHAB - NSDCADDINFO1FT_PSY_ALL_CORE
THIS IS AN IN-PERSON APPOINTMENT. PLEASE ARRIVE 30 MINUTES PRIOR TO YOUR APPOINTMENT  *YOU MUST ATTEND THE INITIAL INTAKE APPOINTMENT IN ORDER TO SEE YOUR PSYCHIATRIST. A MISSED INTAKE WILL CANCEL PSYCHIATRIST'S APPT AUTOMATICALLY! *

## 2024-02-14 NOTE — BH INPATIENT PSYCHIATRY PROGRESS NOTE - NSBHMSEMOOD_PSY_A_CORE
May 17, 2021      To Whom It May Concern:      Bri Daniels was seen in our Emergency Department today, 05/17/21.  I expect her condition to improve over the next 7 days. She has been sick over the last 5 days. She cannot return to school until Covid testing is negative and not running fevers. Anticipate this test should be back in the next 48 hours.      Sincerely,        Kiran Yusuf MD         Depressed/Anxious

## 2024-02-14 NOTE — BH INPATIENT PSYCHIATRY PROGRESS NOTE - NSBHFUPINTERVALHXFT_PSY_A_CORE
Pt seen and evaluated, chart reviewed. As per nursing report, pt with elevated FS overnight, anxious and c/o difficulty sleeping, seen by medical PA, PRN trazodone given. On evaluation, pt presents reading book in dayroom, agrees to interview privately in room. He reports he is the "same". He reports anxious ruminations and difficulty sleeping last night d/t elevated sugar overnight, reports worry on being given adequate insulin coverage. Emotional support provided, informed on medicine consult and recs. He reports improving appetite, requesting for double portions; RD consult pending. He denies AH and VH. Denies paranoia. Denies SI/HI, intent and plan. He is adherent to medications, denies negative side effects. Visible on unit and in behavioral control.   Spoke with pt's mother, Araceli ([phone]) - updated on POC. Pt seen and evaluated, chart reviewed. As per nursing report, pt with elevated FS overnight, anxious and c/o difficulty sleeping, seen by medical PA, PRN trazodone given. On evaluation, pt presents reading book in dayroom, agrees to interview privately in room. He reports he is the "same". He reports anxious ruminations and difficulty sleeping last night d/t concerns of elevated sugar and getting enough insulin coverage. Emotional support provided, informed on medicine consult and recs. He reports improving appetite, requesting for double portions; RD consult pending. He denies AH and VH. Denies paranoia. Denies SI/HI, intent and plan. He is adherent to medications, denies negative side effects. Visible on unit and in behavioral control. He appears more future-oriented, endorses plan to building coping skills and accepting breakup, and going home to go back to work.   Spoke with pt's mother, Araceli ([phone]) - updated on POC.

## 2024-02-14 NOTE — BH INPATIENT PSYCHIATRY PROGRESS NOTE - CURRENT MEDICATION
MEDICATIONS  (STANDING):  dextrose 5%. 1000 milliLiter(s) (50 mL/Hr) IV Continuous <Continuous>  dextrose 5%. 1000 milliLiter(s) (100 mL/Hr) IV Continuous <Continuous>  dextrose 50% Injectable 12.5 Gram(s) IV Push once  dextrose 50% Injectable 25 Gram(s) IV Push once  dextrose 50% Injectable 25 Gram(s) IV Push once  glucagon  Injectable 1 milliGRAM(s) IntraMuscular once  insulin glargine Injectable (LANTUS) 22 Unit(s) SubCutaneous every morning  insulin lispro (ADMELOG) corrective regimen sliding scale   SubCutaneous three times a day before meals  insulin lispro Injectable (ADMELOG) 8 Unit(s) SubCutaneous three times a day before meals    MEDICATIONS  (PRN):  dextrose Oral Gel 15 Gram(s) Oral once PRN Blood Glucose LESS THAN 70 milliGRAM(s)/deciliter  haloperidol     Tablet 5 milliGRAM(s) Oral every 6 hours PRN agitation  hydrOXYzine hydrochloride 50 milliGRAM(s) Oral every 6 hours PRN anxiety  LORazepam     Tablet 2 milliGRAM(s) Oral every 6 hours PRN combative bx  traZODone 50 milliGRAM(s) Oral at bedtime PRN insomnia

## 2024-02-14 NOTE — BH INPATIENT PSYCHIATRY PROGRESS NOTE - NSBHMETABOLIC_PSY_ALL_CORE_FT
BMI: BMI (kg/m2): 21.1 (02-12-24 @ 14:55)  HbA1c: A1C with Estimated Average Glucose Result: 10.8 % (02-09-24 @ 04:23)    Glucose: POCT Blood Glucose.: 238 mg/dL (02-14-24 @ 07:32)    BP: 113/67 (02-14-24 @ 08:06) (113/67 - 134/82)Vital Signs Last 24 Hrs  T(C): 35.7 (02-14-24 @ 08:06), Max: 36 (02-13-24 @ 15:58)  T(F): 96.2 (02-14-24 @ 08:06), Max: 96.8 (02-13-24 @ 15:58)  HR: 91 (02-14-24 @ 08:06) (91 - 94)  BP: 113/67 (02-14-24 @ 08:06) (113/67 - 134/82)  BP(mean): --  RR: 16 (02-14-24 @ 08:06) (16 - 16)  SpO2: --      Lipid Panel: Date/Time: 02-08-24 @ 04:30  Cholesterol, Serum: 191  LDL Cholesterol Calculated: 91  HDL Cholesterol, Serum: 49  Total Cholesterol/HDL Ration Measurement: --  Triglycerides, Serum: 252   BMI: BMI (kg/m2): 21.1 (02-12-24 @ 14:55)  HbA1c: A1C with Estimated Average Glucose Result: 10.8 % (02-09-24 @ 04:23)    Glucose: POCT Blood Glucose.: 147 mg/dL (02-14-24 @ 11:23)    BP: 113/67 (02-14-24 @ 08:06) (113/67 - 134/82)Vital Signs Last 24 Hrs  T(C): 35.7 (02-14-24 @ 08:06), Max: 36 (02-13-24 @ 15:58)  T(F): 96.2 (02-14-24 @ 08:06), Max: 96.8 (02-13-24 @ 15:58)  HR: 91 (02-14-24 @ 08:06) (91 - 94)  BP: 113/67 (02-14-24 @ 08:06) (113/67 - 134/82)  BP(mean): --  RR: 16 (02-14-24 @ 08:06) (16 - 16)  SpO2: --      Lipid Panel: Date/Time: 02-08-24 @ 04:30  Cholesterol, Serum: 191  LDL Cholesterol Calculated: 91  HDL Cholesterol, Serum: 49  Total Cholesterol/HDL Ration Measurement: --  Triglycerides, Serum: 252

## 2024-02-14 NOTE — BH INPATIENT PSYCHIATRY PROGRESS NOTE - NSBHASSESSSUMMFT_PSY_ALL_CORE
29 y/o male, domiciled with mother, employed, single, no children, PMH of DM1, and PPH of ADHD and an unclear mood disorder, who was admitted to the ICU following an intentional overdose on his insulin in suicide attempt. He presented with ongoing symptoms of depression and anxiety, had not been taking care of himself and likely his health in the weeks leading up to the suicide attempt. Even if the overdose was impulsive, patient's premorbid functioning give the concern for worsening depression and anxiety. In addition, it is likely that his childhood experience of neglect has contributed to a maladaptive manner of dealing with relationships and concern for abandonment and a desire to be loved. He therefore seems to have significant difficulty coping with perceived rejection causing worsening depression and anxiety.     On evaluation, pt presents dysphoric and anxious, endorses anxious ruminations on events leading to admission and current circumstances. He reports resolution of SI/I/P, appears more future-oriented and now endorsing concern on his current diabetes management. No overt psychosis noted. He is adherent to medications and has not been a behavioral concern.     #MDD  -titrate zoloft 150 mg daily (2/15)  -c/w trazodone 50 mg qhs prn for insomnia  -c/w hydroxyzine 50 mg q6h prn for anxiety  -encourage groups/DBT    #DM1  -hospitalist consult pending  -c/w lantus 22 units qAM, lispro 8 units tid with meals, SSI  -c/w FS achs, consistent carbs diet    #Agitation  -for agitation not amenable to verbal redirection, may give haldol 3 mg q6h prn and ativan 1 mg q6h prn with escalation to IM if pt is refusing PO and is an acute danger to self or/and others with repeat EKG to ensure QTc <500 ms

## 2024-02-14 NOTE — BH DISCHARGE NOTE NURSING/SOCIAL WORK/PSYCH REHAB - NSDCNEXTLEVEL_PSY_ALL_CORE
Prior Authorization Approval    Authorization Effective Date: 4/14/2021  Authorization Expiration Date: 5/13/2024  Medication: [facility] [address] [place]-APPROVED  Approved Dose/Quantity:   Reference #:     Insurance Company: [employer] - Phone [phone] Fax [phone]  Expected CoPay:       CoPay Card Available:      Foundation Assistance Needed:    Which Pharmacy is filling the prescription (Not needed for infusion/clinic administered): [facility] [address]  Pharmacy Notified: Yes  Patient Notified: No    Got verbal approval.  Pharmacy will notify patient when medication is ready.     Yes

## 2024-02-14 NOTE — BH DISCHARGE NOTE NURSING/SOCIAL WORK/PSYCH REHAB - PATIENT PORTAL LINK FT
You can access the FollowMyHealth Patient Portal offered by [facility] by registering at the following website: [email or web address]. By joining [facility]’s FollowMyHealth portal, you will also be able to view your health information using other applications (apps) compatible with our system.

## 2024-02-15 LAB
GLUCOSE BLDC GLUCOMTR-MCNC: 156 MG/DL — HIGH (ref 70–99)
GLUCOSE BLDC GLUCOMTR-MCNC: 250 MG/DL — HIGH (ref 70–99)
GLUCOSE BLDC GLUCOMTR-MCNC: 288 MG/DL — HIGH (ref 70–99)
GLUCOSE BLDC GLUCOMTR-MCNC: 77 MG/DL — SIGNIFICANT CHANGE UP (ref 70–99)

## 2024-02-15 PROCEDURE — 99231 SBSQ HOSP IP/OBS SF/LOW 25: CPT

## 2024-02-15 RX ORDER — SERTRALINE 25 MG/1
125 TABLET, FILM COATED ORAL DAILY
Refills: 0 | Status: DISCONTINUED | OUTPATIENT
Start: 2024-02-16 | End: 2024-02-20

## 2024-02-15 RX ADMIN — Medication 2: at 16:24

## 2024-02-15 RX ADMIN — Medication 8 UNIT(S): at 07:25

## 2024-02-15 RX ADMIN — Medication 8 UNIT(S): at 16:24

## 2024-02-15 RX ADMIN — Medication 50 MILLIGRAM(S): at 22:32

## 2024-02-15 RX ADMIN — Medication 1: at 07:26

## 2024-02-15 RX ADMIN — Medication 50 MILLIGRAM(S): at 22:33

## 2024-02-15 RX ADMIN — INSULIN GLARGINE 22 UNIT(S): 100 INJECTION, SOLUTION SUBCUTANEOUS at 08:15

## 2024-02-15 RX ADMIN — Medication 8 UNIT(S): at 11:45

## 2024-02-15 RX ADMIN — SERTRALINE 150 MILLIGRAM(S): 25 TABLET, FILM COATED ORAL at 08:18

## 2024-02-15 NOTE — BH INPATIENT PSYCHIATRY PROGRESS NOTE - NSBHASSESSSUMMFT_PSY_ALL_CORE
27 y/o male, domiciled with mother, employed, single, no children, PMH of DM1, and PPH of ADHD and an unclear mood disorder, who was admitted to the ICU following an intentional overdose on his insulin in suicide attempt. He presented with ongoing symptoms of depression and anxiety, had not been taking care of himself and likely his health in the weeks leading up to the suicide attempt. Even if the overdose was impulsive, patient's premorbid functioning give the concern for worsening depression and anxiety. In addition, it is likely that his childhood experience of neglect has contributed to a maladaptive manner of dealing with relationships and concern for abandonment and a desire to be loved. He therefore seems to have significant difficulty coping with perceived rejection causing worsening depression and anxiety.     On evaluation, pt presents dysphoric and anxious, endorses anxious ruminations on events leading to admission and current circumstances. He reports resolution of SI/I/P, appears more future-oriented and now endorsing concern on his current diabetes management. No overt psychosis noted. He is adherent to medications, today c/o new onset fine b/l hand tremors with zoloft titration. Pt has not been a behavioral concern.     #MDD  -taper zoloft 125 mg daily   -c/w trazodone 50 mg qhs prn for insomnia  -c/w hydroxyzine 50 mg q6h prn for anxiety  -encourage groups/DBT    #DM1  -hospitalist consult  -c/w lantus 22 units qAM, lispro 8 units tid with meals, SSI  -c/w FS achs, consistent carbs diet    #Agitation  -for agitation not amenable to verbal redirection, may give haldol 3 mg q6h prn and ativan 1 mg q6h prn with escalation to IM if pt is refusing PO and is an acute danger to self or/and others with repeat EKG to ensure QTc <500 ms 27 y/o male, domiciled with mother, employed, single, no children, PMH of DM1, and PPH of ADHD and an unclear mood disorder, who was admitted to the ICU following an intentional overdose on his insulin in suicide attempt. He presented with ongoing symptoms of depression and anxiety, had not been taking care of himself and likely his health in the weeks leading up to the suicide attempt. Even if the overdose was impulsive, patient's premorbid functioning give the concern for worsening depression and anxiety. In addition, it is likely that his childhood experience of neglect has contributed to a maladaptive manner of dealing with relationships and concern for abandonment and a desire to be loved. He therefore seems to have significant difficulty coping with perceived rejection causing worsening depression and anxiety.     On evaluation, pt presents dysphoric and anxious, endorses anxious ruminations on events leading to admission and current circumstances resulting in disrupted sleep. He reports resolution of SI/I/P, appears more future-oriented and now endorsing concern on his current diabetes management. No overt psychosis noted. He is adherent to medications, reported new onset fine b/l hand tremors with zoloft titration, somewhat improved with taper today; pt also reported spontaneous nosebleed with quick resolution. Discussed treatment options, pt agreed to continue tapered zoloft dose, and to consider alternatives if no improvement in side effects. Pt has not been a behavioral concern.     #MDD; r/o bipolar d/o  -c/w zoloft 125 mg daily (2/16) **consider alternative if side effects persist  -c/w trazodone 50 mg qhs prn for insomnia  -c/w hydroxyzine 50 mg q6h prn for anxiety  -encourage groups/DBT    #DM1  -hospitalist consult  -c/w lantus 22 units qAM, lispro 8 units tid with meals, SSI  -c/w FS achs, consistent carbs diet  -endocrinology consult pending     #Agitation  -for agitation not amenable to verbal redirection, may give haldol 3 mg q6h prn and ativan 1 mg q6h prn with escalation to IM if pt is refusing PO and is an acute danger to self or/and others with repeat EKG to ensure QTc <500 ms

## 2024-02-15 NOTE — BH INPATIENT PSYCHIATRY PROGRESS NOTE - NSBHCHARTREVIEWVS_PSY_A_CORE FT
Vital Signs Last 24 Hrs  T(C): 36.1 (02-15-24 @ 15:53), Max: 36.1 (02-15-24 @ 15:53)  T(F): 97 (02-15-24 @ 15:53), Max: 97 (02-15-24 @ 15:53)  HR: 95 (02-15-24 @ 15:53) (95 - 96)  BP: 139/83 (02-15-24 @ 15:53) (118/72 - 139/83)  BP(mean): --  RR: 16 (02-15-24 @ 15:53) (16 - 16)  SpO2: --     Vital Signs Last 24 Hrs  T(C): 35.4 (02-16-24 @ 08:15), Max: 35.8 (02-16-24 @ 05:59)  T(F): 95.7 (02-16-24 @ 08:15), Max: 96.4 (02-16-24 @ 05:59)  HR: 92 (02-16-24 @ 08:15) (92 - 99)  BP: 113/56 (02-16-24 @ 08:15) (113/56 - 131/81)  BP(mean): --  RR: 18 (02-16-24 @ 08:15) (18 - 20)  SpO2: --

## 2024-02-15 NOTE — BH INPATIENT PSYCHIATRY PROGRESS NOTE - NSBHFUPINTERVALHXFT_PSY_A_CORE
Pt seen and evaluated, chart reviewed. As per nursing report, pt c/o difficulty sleeping, PRN trazodone given. On evaluation, pt presents linear and well-groomed, playing chess with peer in dayroom. He reports difficulty falling asleep last night, denies racing thoughts, admits to anxious ruminations. Emotional support provided. Otherwise he some improvement in appetite and motivation, endorses plan to attend more groups/DBT today. He denies AH and VH. Denies paranoia. Denies SI/HI, intent and plan. He is adherent to medications, c/o mild b/l hand tremors, denies other negative side effects. Visible on unit and in behavioral control. He appears more future-oriented, endorses plan to building coping skills and accepting breakup, and going home to go back to work.

## 2024-02-15 NOTE — BH SAFETY PLAN - ENVIRONMENT SAFETY 1:
I feel my environment would be safer if I could build a better support system and expand new relationships and social circles.

## 2024-02-15 NOTE — BH INPATIENT PSYCHIATRY PROGRESS NOTE - CURRENT MEDICATION
MEDICATIONS  (STANDING):  dextrose 5%. 1000 milliLiter(s) (100 mL/Hr) IV Continuous <Continuous>  dextrose 5%. 1000 milliLiter(s) (50 mL/Hr) IV Continuous <Continuous>  dextrose 50% Injectable 12.5 Gram(s) IV Push once  dextrose 50% Injectable 25 Gram(s) IV Push once  dextrose 50% Injectable 25 Gram(s) IV Push once  glucagon  Injectable 1 milliGRAM(s) IntraMuscular once  insulin glargine Injectable (LANTUS) 22 Unit(s) SubCutaneous every morning  insulin lispro (ADMELOG) corrective regimen sliding scale   SubCutaneous three times a day before meals  insulin lispro Injectable (ADMELOG) 8 Unit(s) SubCutaneous three times a day before meals    MEDICATIONS  (PRN):  dextrose Oral Gel 15 Gram(s) Oral once PRN Blood Glucose LESS THAN 70 milliGRAM(s)/deciliter  haloperidol     Tablet 5 milliGRAM(s) Oral every 6 hours PRN agitation  hydrOXYzine hydrochloride 50 milliGRAM(s) Oral every 6 hours PRN anxiety  LORazepam     Tablet 2 milliGRAM(s) Oral every 6 hours PRN combative bx  traZODone 50 milliGRAM(s) Oral at bedtime PRN insomnia   MEDICATIONS  (STANDING):  dextrose 5%. 1000 milliLiter(s) (50 mL/Hr) IV Continuous <Continuous>  dextrose 5%. 1000 milliLiter(s) (100 mL/Hr) IV Continuous <Continuous>  dextrose 50% Injectable 12.5 Gram(s) IV Push once  dextrose 50% Injectable 25 Gram(s) IV Push once  dextrose 50% Injectable 25 Gram(s) IV Push once  glucagon  Injectable 1 milliGRAM(s) IntraMuscular once  insulin glargine Injectable (LANTUS) 22 Unit(s) SubCutaneous every morning  insulin lispro (ADMELOG) corrective regimen sliding scale   SubCutaneous three times a day before meals  insulin lispro Injectable (ADMELOG) 8 Unit(s) SubCutaneous three times a day before meals  sertraline 125 milliGRAM(s) Oral daily    MEDICATIONS  (PRN):  dextrose Oral Gel 15 Gram(s) Oral once PRN Blood Glucose LESS THAN 70 milliGRAM(s)/deciliter  haloperidol     Tablet 5 milliGRAM(s) Oral every 6 hours PRN agitation  hydrOXYzine hydrochloride 50 milliGRAM(s) Oral every 6 hours PRN anxiety  LORazepam     Tablet 2 milliGRAM(s) Oral every 6 hours PRN combative bx  traZODone 50 milliGRAM(s) Oral at bedtime PRN insomnia

## 2024-02-15 NOTE — BH INPATIENT PSYCHIATRY PROGRESS NOTE - NSBHMETABOLIC_PSY_ALL_CORE_FT
BMI: BMI (kg/m2): 21.1 (02-12-24 @ 14:55)  HbA1c: A1C with Estimated Average Glucose Result: 10.8 % (02-09-24 @ 04:23)    Glucose: POCT Blood Glucose.: 77 mg/dL (02-15-24 @ 10:36)    BP: 139/83 (02-15-24 @ 15:53) (113/67 - 139/83)Vital Signs Last 24 Hrs  T(C): 36.1 (02-15-24 @ 15:53), Max: 36.1 (02-15-24 @ 15:53)  T(F): 97 (02-15-24 @ 15:53), Max: 97 (02-15-24 @ 15:53)  HR: 95 (02-15-24 @ 15:53) (95 - 96)  BP: 139/83 (02-15-24 @ 15:53) (118/72 - 139/83)  BP(mean): --  RR: 16 (02-15-24 @ 15:53) (16 - 16)  SpO2: --      Lipid Panel: Date/Time: 02-08-24 @ 04:30  Cholesterol, Serum: 191  LDL Cholesterol Calculated: 91  HDL Cholesterol, Serum: 49  Total Cholesterol/HDL Ration Measurement: --  Triglycerides, Serum: 252   BMI: BMI (kg/m2): 21.1 (02-12-24 @ 14:55)  HbA1c: A1C with Estimated Average Glucose Result: 10.8 % (02-09-24 @ 04:23)    Glucose: POCT Blood Glucose.: 163 mg/dL (02-16-24 @ 16:05)    BP: 113/56 (02-16-24 @ 08:15) (113/56 - 139/83)Vital Signs Last 24 Hrs  T(C): 35.4 (02-16-24 @ 08:15), Max: 35.8 (02-16-24 @ 05:59)  T(F): 95.7 (02-16-24 @ 08:15), Max: 96.4 (02-16-24 @ 05:59)  HR: 92 (02-16-24 @ 08:15) (92 - 99)  BP: 113/56 (02-16-24 @ 08:15) (113/56 - 131/81)  BP(mean): --  RR: 18 (02-16-24 @ 08:15) (18 - 20)  SpO2: --      Lipid Panel: Date/Time: 02-08-24 @ 04:30  Cholesterol, Serum: 191  LDL Cholesterol Calculated: 91  HDL Cholesterol, Serum: 49  Total Cholesterol/HDL Ration Measurement: --  Triglycerides, Serum: 252

## 2024-02-16 DIAGNOSIS — F41.9 ANXIETY DISORDER, UNSPECIFIED: ICD-10-CM

## 2024-02-16 DIAGNOSIS — F17.210 NICOTINE DEPENDENCE, CIGARETTES, UNCOMPLICATED: ICD-10-CM

## 2024-02-16 DIAGNOSIS — F32.1 MAJOR DEPRESSIVE DISORDER, SINGLE EPISODE, MODERATE: ICD-10-CM
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
## 2024-02-16 DIAGNOSIS — E78.00 PURE HYPERCHOLESTEROLEMIA, UNSPECIFIED: ICD-10-CM

## 2024-02-16 DIAGNOSIS — E10.9 TYPE 1 DIABETES MELLITUS WITHOUT COMPLICATIONS: ICD-10-CM

## 2024-02-16 DIAGNOSIS — R45.851 SUICIDAL IDEATIONS: ICD-10-CM

## 2024-02-16 DIAGNOSIS — T38.3X2A POISONING BY INSULIN AND ORAL HYPOGLYCEMIC [ANTIDIABETIC] DRUGS, INTENTIONAL SELF-HARM, INITIAL ENCOUNTER: ICD-10-CM

## 2024-02-16 LAB
GLUCOSE BLDC GLUCOMTR-MCNC: 110 MG/DL — HIGH (ref 70–99)
GLUCOSE BLDC GLUCOMTR-MCNC: 163 MG/DL — HIGH (ref 70–99)
GLUCOSE BLDC GLUCOMTR-MCNC: 247 MG/DL — HIGH (ref 70–99)
GLUCOSE BLDC GLUCOMTR-MCNC: 254 MG/DL — HIGH (ref 70–99)

## 2024-02-16 PROCEDURE — 99231 SBSQ HOSP IP/OBS SF/LOW 25: CPT

## 2024-02-16 RX ADMIN — Medication 8 UNIT(S): at 16:35

## 2024-02-16 RX ADMIN — Medication 1: at 16:35

## 2024-02-16 RX ADMIN — SERTRALINE 125 MILLIGRAM(S): 25 TABLET, FILM COATED ORAL at 08:11

## 2024-02-16 RX ADMIN — Medication 8 UNIT(S): at 11:33

## 2024-02-16 RX ADMIN — Medication 8 UNIT(S): at 07:34

## 2024-02-16 RX ADMIN — Medication 50 MILLIGRAM(S): at 23:35

## 2024-02-16 RX ADMIN — Medication 2: at 07:35

## 2024-02-16 RX ADMIN — INSULIN GLARGINE 22 UNIT(S): 100 INJECTION, SOLUTION SUBCUTANEOUS at 07:36

## 2024-02-16 NOTE — BH INPATIENT PSYCHIATRY PROGRESS NOTE - CURRENT MEDICATION
MEDICATIONS  (STANDING):  dextrose 5%. 1000 milliLiter(s) (100 mL/Hr) IV Continuous <Continuous>  dextrose 5%. 1000 milliLiter(s) (50 mL/Hr) IV Continuous <Continuous>  dextrose 50% Injectable 12.5 Gram(s) IV Push once  dextrose 50% Injectable 25 Gram(s) IV Push once  dextrose 50% Injectable 25 Gram(s) IV Push once  glucagon  Injectable 1 milliGRAM(s) IntraMuscular once  insulin glargine Injectable (LANTUS) 22 Unit(s) SubCutaneous every morning  insulin lispro (ADMELOG) corrective regimen sliding scale   SubCutaneous three times a day before meals  insulin lispro Injectable (ADMELOG) 8 Unit(s) SubCutaneous three times a day before meals  sertraline 125 milliGRAM(s) Oral daily    MEDICATIONS  (PRN):  dextrose Oral Gel 15 Gram(s) Oral once PRN Blood Glucose LESS THAN 70 milliGRAM(s)/deciliter  haloperidol     Tablet 5 milliGRAM(s) Oral every 6 hours PRN agitation  hydrOXYzine hydrochloride 50 milliGRAM(s) Oral every 6 hours PRN anxiety  LORazepam     Tablet 2 milliGRAM(s) Oral every 6 hours PRN combative bx  traZODone 50 milliGRAM(s) Oral at bedtime PRN insomnia

## 2024-02-16 NOTE — BH INPATIENT PSYCHIATRY PROGRESS NOTE - NSBHFUPINTERVALHXFT_PSY_A_CORE
Pt seen and evaluated, chart reviewed. As per nursing report, pt c/o difficulty sleeping, PRN trazodone given. On evaluation, pt socializing with peers in hallway, well-related and well-groomed. He reports difficulty sleeping overnight d/t anxious ruminations on events leading to admission. He endorses some improvement in mood and anxiety, states he is motivated to attend more groups today and build coping skills. He denies AH and VH. Denies paranoia. Denies SI/HI, intent and plan. He is adherent to medications, reports some improvement in b/l hand tremors with zoloft taper, c/o one episode of spontaneous nosebleed this morning. Discussed alternative treatment options, pt agreed to continue tapered zoloft dose, and to consider alternatives if no improvement in side effects. He is visible on unit and in behavioral control.  0

## 2024-02-16 NOTE — BH INPATIENT PSYCHIATRY PROGRESS NOTE - NSBHASSESSSUMMFT_PSY_ALL_CORE
27 y/o male, domiciled with mother, employed, single, no children, PMH of DM1, and PPH of ADHD and an unclear mood disorder, who was admitted to the ICU following an intentional overdose on his insulin in suicide attempt. He presented with ongoing symptoms of depression and anxiety, had not been taking care of himself and likely his health in the weeks leading up to the suicide attempt. Even if the overdose was impulsive, patient's premorbid functioning give the concern for worsening depression and anxiety. In addition, it is likely that his childhood experience of neglect has contributed to a maladaptive manner of dealing with relationships and concern for abandonment and a desire to be loved. He therefore seems to have significant difficulty coping with perceived rejection causing worsening depression and anxiety.     On evaluation, pt presents dysphoric and anxious, endorses anxious ruminations on events leading to admission and current circumstances resulting in disrupted sleep. He reports resolution of SI/I/P, appears more future-oriented and now endorsing concern on his current diabetes management. No overt psychosis noted. He is adherent to medications, reported new onset fine b/l hand tremors with zoloft titration, somewhat improved with taper today; pt also reported spontaneous nosebleed with quick resolution. Discussed treatment options, pt agreed to continue tapered zoloft dose, and to consider alternatives if no improvement in side effects. Pt has not been a behavioral concern.     #MDD; r/o bipolar d/o  -c/w zoloft 125 mg daily (2/16) **consider alternative if side effects persist  -c/w trazodone 50 mg qhs prn for insomnia  -c/w hydroxyzine 50 mg q6h prn for anxiety  -encourage groups/DBT    #DM1  -hospitalist consult  -c/w lantus 22 units qAM, lispro 8 units tid with meals, SSI  -c/w FS achs, consistent carbs diet  -endocrinology consult pending     #Agitation  -for agitation not amenable to verbal redirection, may give haldol 3 mg q6h prn and ativan 1 mg q6h prn with escalation to IM if pt is refusing PO and is an acute danger to self or/and others with repeat EKG to ensure QTc <500 ms

## 2024-02-16 NOTE — BH INPATIENT PSYCHIATRY PROGRESS NOTE - NSBHMETABOLIC_PSY_ALL_CORE_FT
BMI: BMI (kg/m2): 21.1 (02-12-24 @ 14:55)  HbA1c: A1C with Estimated Average Glucose Result: 10.8 % (02-09-24 @ 04:23)    Glucose: POCT Blood Glucose.: 163 mg/dL (02-16-24 @ 16:05)    BP: 113/56 (02-16-24 @ 08:15) (113/56 - 139/83)Vital Signs Last 24 Hrs  T(C): 35.4 (02-16-24 @ 08:15), Max: 35.8 (02-16-24 @ 05:59)  T(F): 95.7 (02-16-24 @ 08:15), Max: 96.4 (02-16-24 @ 05:59)  HR: 92 (02-16-24 @ 08:15) (92 - 99)  BP: 113/56 (02-16-24 @ 08:15) (113/56 - 131/81)  BP(mean): --  RR: 18 (02-16-24 @ 08:15) (18 - 20)  SpO2: --      Lipid Panel: Date/Time: 02-08-24 @ 04:30  Cholesterol, Serum: 191  LDL Cholesterol Calculated: 91  HDL Cholesterol, Serum: 49  Total Cholesterol/HDL Ration Measurement: --  Triglycerides, Serum: 252

## 2024-02-16 NOTE — BH INPATIENT PSYCHIATRY PROGRESS NOTE - NSBHCHARTREVIEWVS_PSY_A_CORE FT
Vital Signs Last 24 Hrs  T(C): 35.4 (02-16-24 @ 08:15), Max: 35.8 (02-16-24 @ 05:59)  T(F): 95.7 (02-16-24 @ 08:15), Max: 96.4 (02-16-24 @ 05:59)  HR: 92 (02-16-24 @ 08:15) (92 - 99)  BP: 113/56 (02-16-24 @ 08:15) (113/56 - 131/81)  BP(mean): --  RR: 18 (02-16-24 @ 08:15) (18 - 20)  SpO2: --

## 2024-02-17 LAB
GLUCOSE BLDC GLUCOMTR-MCNC: 172 MG/DL — HIGH (ref 70–99)
GLUCOSE BLDC GLUCOMTR-MCNC: 176 MG/DL — HIGH (ref 70–99)
GLUCOSE BLDC GLUCOMTR-MCNC: 221 MG/DL — HIGH (ref 70–99)
GLUCOSE BLDC GLUCOMTR-MCNC: 401 MG/DL — HIGH (ref 70–99)

## 2024-02-17 PROCEDURE — 99231 SBSQ HOSP IP/OBS SF/LOW 25: CPT

## 2024-02-17 RX ADMIN — Medication 50 MILLIGRAM(S): at 20:18

## 2024-02-17 RX ADMIN — Medication 6: at 07:29

## 2024-02-17 RX ADMIN — Medication 1: at 16:09

## 2024-02-17 RX ADMIN — Medication 50 MILLIGRAM(S): at 01:12

## 2024-02-17 RX ADMIN — INSULIN GLARGINE 22 UNIT(S): 100 INJECTION, SOLUTION SUBCUTANEOUS at 07:31

## 2024-02-17 RX ADMIN — Medication 8 UNIT(S): at 07:28

## 2024-02-17 RX ADMIN — Medication 8 UNIT(S): at 11:10

## 2024-02-17 RX ADMIN — Medication 8 UNIT(S): at 16:08

## 2024-02-17 RX ADMIN — Medication 2: at 11:10

## 2024-02-17 RX ADMIN — SERTRALINE 125 MILLIGRAM(S): 25 TABLET, FILM COATED ORAL at 08:12

## 2024-02-17 NOTE — BH INPATIENT PSYCHIATRY PROGRESS NOTE - NSBHCHARTREVIEWVS_PSY_A_CORE FT
Vital Signs Last 24 Hrs  T(C): 35.8 (02-17-24 @ 08:53), Max: 36.1 (02-17-24 @ 06:10)  T(F): 96.4 (02-17-24 @ 08:53), Max: 96.9 (02-17-24 @ 06:10)  HR: 110 (02-17-24 @ 08:53) (99 - 110)  BP: 114/80 (02-17-24 @ 08:53) (114/80 - 131/81)  BP(mean): --  RR: 18 (02-17-24 @ 08:53) (18 - 20)  SpO2: --

## 2024-02-17 NOTE — BH INPATIENT PSYCHIATRY PROGRESS NOTE - NSBHASSESSSUMMFT_PSY_ALL_CORE
29 y/o male, domiciled with mother, employed, single, no children, PMH of DM1, and PPH of ADHD and an unclear mood disorder, who was admitted to the ICU following an intentional overdose on his insulin in suicide attempt. He presented with ongoing symptoms of depression and anxiety, had not been taking care of himself and likely his health in the weeks leading up to the suicide attempt. Even if the overdose was impulsive, patient's premorbid functioning give the concern for worsening depression and anxiety. In addition, it is likely that his childhood experience of neglect has contributed to a maladaptive manner of dealing with relationships and concern for abandonment and a desire to be loved. He therefore seems to have significant difficulty coping with perceived rejection causing worsening depression and anxiety.     On evaluation, pt presents dysphoric and anxious, endorses anxious ruminations on events leading to admission and current circumstances resulting in disrupted sleep. He reports resolution of SI/I/P, appears more future-oriented and now endorsing concern on his current diabetes management. No overt psychosis noted. He is adherent to medications, reported new onset fine b/l hand tremors with zoloft titration, somewhat improved with taper today; pt also reported spontaneous nosebleed with quick resolution. Discussed treatment options, pt agreed to continue tapered zoloft dose, and to consider alternatives if no improvement in side effects. Pt has not been a behavioral concern.     2/21/24  patient remains with significant ruminations.   experiencing tremor that may be side effect of his antidepressant medication.    #MDD; r/o bipolar d/o  -c/w zoloft 125 mg daily (2/16) **consider alternative if side effects persist  -c/w trazodone 50 mg qhs prn for insomnia  -c/w hydroxyzine 50 mg q6h prn for anxiety  -encourage groups/DBT    #DM1  -hospitalist consult  -c/w lantus 22 units qAM, lispro 8 units tid with meals, SSI  -c/w FS achs, consistent carbs diet  -endocrinology consult pending     #Agitation  -for agitation not amenable to verbal redirection, may give haldol 3 mg q6h prn and ativan 1 mg q6h prn with escalation to IM if pt is refusing PO and is an acute danger to self or/and others with repeat EKG to ensure QTc <500 ms

## 2024-02-17 NOTE — BH INPATIENT PSYCHIATRY PROGRESS NOTE - NSBHMETABOLIC_PSY_ALL_CORE_FT
BMI: BMI (kg/m2): 21.1 (02-12-24 @ 14:55)  HbA1c: A1C with Estimated Average Glucose Result: 10.8 % (02-09-24 @ 04:23)    Glucose: POCT Blood Glucose.: 221 mg/dL (02-17-24 @ 11:07)    BP: 114/80 (02-17-24 @ 08:53) (113/56 - 139/83)Vital Signs Last 24 Hrs  T(C): 35.8 (02-17-24 @ 08:53), Max: 36.1 (02-17-24 @ 06:10)  T(F): 96.4 (02-17-24 @ 08:53), Max: 96.9 (02-17-24 @ 06:10)  HR: 110 (02-17-24 @ 08:53) (99 - 110)  BP: 114/80 (02-17-24 @ 08:53) (114/80 - 131/81)  BP(mean): --  RR: 18 (02-17-24 @ 08:53) (18 - 20)  SpO2: --      Lipid Panel: Date/Time: 02-08-24 @ 04:30  Cholesterol, Serum: 191  LDL Cholesterol Calculated: 91  HDL Cholesterol, Serum: 49  Total Cholesterol/HDL Ration Measurement: --  Triglycerides, Serum: 252

## 2024-02-17 NOTE — BH INPATIENT PSYCHIATRY PROGRESS NOTE - NSBHFUPINTERVALHXFT_PSY_A_CORE
Pt seen and evaluated, chart reviewed. As per nursing report, pt c/o difficulty sleeping, PRN trazodone given.  Met with patient  who describes continuing ruminations. Feels badly about his breakup and  is highly self-critical of his behavior.  We note the fact that his antidepressant was reduced slightly and that he and his prescriber are in the process of considering another antidepressant.   He states he feels safe in the hospital but because of his ruminations and the way they "got out of hand" last time he is still concerned about his  ability to stay safe.   At the moment he denies and does not evidence combative hostile violent homicidal suicidal with tacitly bizarre ideas.   Mood is sad and anxious, affect mood congruent.  Intellectual insight is good.

## 2024-02-17 NOTE — CONSULT NOTE ADULT - ASSESSMENT
28-year-old male with a past medical history of type 1 diabetes mellitus comes in for suicidal ideation endocrinology was consulted for assistance with  management of hyperglycemia.  Mr. Spivey states that he does diagnosed with type 1 diabetes at age 9, and has been on insulin ever since.  He states that he was on his CGM and pump previously but states that his insurance stopped covering the same    # Type 1 diabetes mellitus  -Under poor control with A1c 10.8  -At home takes 26 units of Basaglar, insulin Admelog 1 unit for every 10 g of carbs and sensitivity of 25 for a target of 140  -Inpatient noted to be high in the a.m., receives Lantus in the morning  -Recommend to switch Lantus dose from 22 units once a day to 12 units twice a day can start tonight at bedtime  - few highs noted post dinner , can consider increasing lispro dinner dose to 10 units, also appears to snack in between meals  -On discharge can continue with his current regimen and follow-up with his outpatient endocrinologist would benefit from a CGM and may be insulin pump?

## 2024-02-18 LAB
GLUCOSE BLDC GLUCOMTR-MCNC: 138 MG/DL — HIGH (ref 70–99)
GLUCOSE BLDC GLUCOMTR-MCNC: 197 MG/DL — HIGH (ref 70–99)
GLUCOSE BLDC GLUCOMTR-MCNC: 205 MG/DL — HIGH (ref 70–99)
GLUCOSE BLDC GLUCOMTR-MCNC: 354 MG/DL — HIGH (ref 70–99)

## 2024-02-18 RX ORDER — ACETAMINOPHEN 500 MG
650 TABLET ORAL ONCE
Refills: 0 | Status: COMPLETED | OUTPATIENT
Start: 2024-02-18 | End: 2024-02-18

## 2024-02-18 RX ADMIN — Medication 650 MILLIGRAM(S): at 15:49

## 2024-02-18 RX ADMIN — INSULIN GLARGINE 22 UNIT(S): 100 INJECTION, SOLUTION SUBCUTANEOUS at 08:03

## 2024-02-18 RX ADMIN — SERTRALINE 125 MILLIGRAM(S): 25 TABLET, FILM COATED ORAL at 08:08

## 2024-02-18 RX ADMIN — Medication 2: at 16:01

## 2024-02-18 RX ADMIN — Medication 650 MILLIGRAM(S): at 14:13

## 2024-02-18 RX ADMIN — Medication 1: at 11:00

## 2024-02-18 RX ADMIN — Medication 8 UNIT(S): at 10:59

## 2024-02-18 RX ADMIN — Medication 5: at 08:04

## 2024-02-18 RX ADMIN — Medication 50 MILLIGRAM(S): at 20:27

## 2024-02-18 RX ADMIN — Medication 8 UNIT(S): at 16:01

## 2024-02-18 RX ADMIN — Medication 8 UNIT(S): at 07:43

## 2024-02-18 NOTE — DIETITIAN INITIAL EVALUATION ADULT - ORAL INTAKE PTA/DIET HISTORY
as per pt consumes a CHO consistent diet PTA with good po intake, denies any recent weight changes. NKFA or intolerances     presently on a CHO consistent diet tolerating well

## 2024-02-18 NOTE — DIETITIAN INITIAL EVALUATION ADULT - PERTINENT MEDS FT
MEDICATIONS  (STANDING):    insulin glargine Injectable (LANTUS) 22 Unit(s) SubCutaneous every morning  insulin lispro (ADMELOG) corrective regimen sliding scale   SubCutaneous three times a day before meals  insulin lispro Injectable (ADMELOG) 8 Unit(s) SubCutaneous three times a day before meals  sertraline 125 milliGRAM(s) Oral daily    MEDICATIONS  (PRN):  dextrose Oral Gel 15 Gram(s) Oral once PRN Blood Glucose LESS THAN 70 milliGRAM(s)/deciliter  haloperidol     Tablet 5 milliGRAM(s) Oral every 6 hours PRN agitation  hydrOXYzine hydrochloride 50 milliGRAM(s) Oral every 6 hours PRN anxiety  LORazepam     Tablet 2 milliGRAM(s) Oral every 6 hours PRN combative bx  traZODone 50 milliGRAM(s) Oral at bedtime PRN insomnia

## 2024-02-18 NOTE — DIETITIAN INITIAL EVALUATION ADULT - OTHER INFO
pt is  27 yo male with ADHD who was admitted to ICU after insulin overdose due to suicide attempt, once stabilized pt transferred to IPP. pt concerned with present intake of carbs to insulin ratio, s/p endocrinology consult insulin dosages adjusted.

## 2024-02-19 LAB
GLUCOSE BLDC GLUCOMTR-MCNC: 170 MG/DL — HIGH (ref 70–99)
GLUCOSE BLDC GLUCOMTR-MCNC: 184 MG/DL — HIGH (ref 70–99)
GLUCOSE BLDC GLUCOMTR-MCNC: 361 MG/DL — HIGH (ref 70–99)
GLUCOSE BLDC GLUCOMTR-MCNC: 367 MG/DL — HIGH (ref 70–99)

## 2024-02-19 RX ADMIN — Medication 5: at 07:27

## 2024-02-19 RX ADMIN — Medication 8 UNIT(S): at 07:27

## 2024-02-19 RX ADMIN — Medication 1: at 16:17

## 2024-02-19 RX ADMIN — INSULIN GLARGINE 22 UNIT(S): 100 INJECTION, SOLUTION SUBCUTANEOUS at 08:36

## 2024-02-19 RX ADMIN — Medication 5: at 11:01

## 2024-02-19 RX ADMIN — Medication 8 UNIT(S): at 16:17

## 2024-02-19 RX ADMIN — SERTRALINE 125 MILLIGRAM(S): 25 TABLET, FILM COATED ORAL at 08:37

## 2024-02-19 RX ADMIN — Medication 8 UNIT(S): at 11:00

## 2024-02-19 RX ADMIN — Medication 50 MILLIGRAM(S): at 20:16

## 2024-02-20 LAB
GLUCOSE BLDC GLUCOMTR-MCNC: 157 MG/DL — HIGH (ref 70–99)
GLUCOSE BLDC GLUCOMTR-MCNC: 175 MG/DL — HIGH (ref 70–99)
GLUCOSE BLDC GLUCOMTR-MCNC: 214 MG/DL — HIGH (ref 70–99)
GLUCOSE BLDC GLUCOMTR-MCNC: 342 MG/DL — HIGH (ref 70–99)

## 2024-02-20 PROCEDURE — 99231 SBSQ HOSP IP/OBS SF/LOW 25: CPT

## 2024-02-20 RX ORDER — ESCITALOPRAM OXALATE 10 MG/1
5 TABLET, FILM COATED ORAL DAILY
Refills: 0 | Status: DISCONTINUED | OUTPATIENT
Start: 2024-02-21 | End: 2024-02-21

## 2024-02-20 RX ORDER — INSULIN GLARGINE 100 [IU]/ML
12 INJECTION, SOLUTION SUBCUTANEOUS
Refills: 0 | Status: DISCONTINUED | OUTPATIENT
Start: 2024-02-20 | End: 2024-02-26

## 2024-02-20 RX ORDER — SERTRALINE 25 MG/1
50 TABLET, FILM COATED ORAL DAILY
Refills: 0 | Status: COMPLETED | OUTPATIENT
Start: 2024-02-21 | End: 2024-02-22

## 2024-02-20 RX ADMIN — Medication 8 UNIT(S): at 11:39

## 2024-02-20 RX ADMIN — Medication 4: at 07:16

## 2024-02-20 RX ADMIN — Medication 50 MILLIGRAM(S): at 22:51

## 2024-02-20 RX ADMIN — Medication 50 MILLIGRAM(S): at 20:24

## 2024-02-20 RX ADMIN — Medication 8 UNIT(S): at 16:20

## 2024-02-20 RX ADMIN — INSULIN GLARGINE 22 UNIT(S): 100 INJECTION, SOLUTION SUBCUTANEOUS at 07:26

## 2024-02-20 RX ADMIN — Medication 1: at 11:39

## 2024-02-20 RX ADMIN — Medication 1: at 16:21

## 2024-02-20 RX ADMIN — Medication 8 UNIT(S): at 06:43

## 2024-02-20 RX ADMIN — SERTRALINE 125 MILLIGRAM(S): 25 TABLET, FILM COATED ORAL at 08:08

## 2024-02-20 NOTE — BH INPATIENT PSYCHIATRY PROGRESS NOTE - CURRENT MEDICATION
MEDICATIONS  (STANDING):  dextrose 5%. 1000 milliLiter(s) (100 mL/Hr) IV Continuous <Continuous>  dextrose 5%. 1000 milliLiter(s) (50 mL/Hr) IV Continuous <Continuous>  dextrose 50% Injectable 12.5 Gram(s) IV Push once  dextrose 50% Injectable 25 Gram(s) IV Push once  dextrose 50% Injectable 25 Gram(s) IV Push once  glucagon  Injectable 1 milliGRAM(s) IntraMuscular once  insulin glargine Injectable (LANTUS) 12 Unit(s) SubCutaneous two times a day  insulin lispro (ADMELOG) corrective regimen sliding scale   SubCutaneous three times a day before meals  insulin lispro Injectable (ADMELOG) 8 Unit(s) SubCutaneous three times a day before meals    MEDICATIONS  (PRN):  dextrose Oral Gel 15 Gram(s) Oral once PRN Blood Glucose LESS THAN 70 milliGRAM(s)/deciliter  haloperidol     Tablet 5 milliGRAM(s) Oral every 6 hours PRN agitation  hydrOXYzine hydrochloride 50 milliGRAM(s) Oral every 6 hours PRN anxiety  traZODone 50 milliGRAM(s) Oral at bedtime PRN insomnia

## 2024-02-20 NOTE — BH INPATIENT PSYCHIATRY PROGRESS NOTE - NSBHASSESSSUMMFT_PSY_ALL_CORE
27 y/o male, domiciled with mother, employed, single, no children, PMH of DM1, and PPH of ADHD and an unclear mood disorder, who was admitted to the ICU following an intentional overdose on his insulin in suicide attempt. He presented with ongoing symptoms of depression and anxiety, had not been taking care of himself and likely his health in the weeks leading up to the suicide attempt. Even if the overdose was impulsive, patient's premorbid functioning give the concern for worsening depression and anxiety. In addition, it is likely that his childhood experience of neglect has contributed to a maladaptive manner of dealing with relationships and concern for abandonment and a desire to be loved. He therefore seems to have significant difficulty coping with perceived rejection causing worsening depression and anxiety.     On evaluation, pt presents dysphoric and anxious, endorses anxious ruminations on events leading to admission and current circumstances resulting in disrupted sleep and low energy. He reports resolution of SI/I/P, appears more future-oriented and now endorsing concern on his current diabetes management. No overt psychosis noted. Pt initially started on zoloft, however during titration, pt reported new onset fine b/l hand tremors and daily nosebleeds. Discussed treatment options, pt agreed to crosstaper to lexapro. Pt has not been a behavioral concern, visible on unit and in groups/DBT.     #MDD; r/o bipolar d/o  -taper zoloft 50 mg daily **goal to crosstaper to lexapro  -start lexapro 5 mg daily  -c/w trazodone 50 mg qhs prn for insomnia  -c/w hydroxyzine 50 mg q6h prn for anxiety  -encourage groups/DBT    #DM1  -hospitalist consult  -endocrinology consult --> titrate lantus 12 units bid, c/w lispro 8 units tid with meals **consider lispro 10 units at dinner, SSI  -c/w FS achs, consistent carbs diet    #Agitation  -for agitation not amenable to verbal redirection, may give haldol 3 mg q6h prn and ativan 1 mg q6h prn with escalation to IM if pt is refusing PO and is an acute danger to self or/and others with repeat EKG to ensure QTc <500 ms

## 2024-02-20 NOTE — BH INPATIENT PSYCHIATRY PROGRESS NOTE - NSBHCHARTREVIEWVS_PSY_A_CORE FT
Vital Signs Last 24 Hrs  T(C): 36.2 (02-20-24 @ 08:30), Max: 36.2 (02-20-24 @ 08:30)  T(F): 97.1 (02-20-24 @ 08:30), Max: 97.1 (02-20-24 @ 08:30)  HR: 105 (02-20-24 @ 08:30) (102 - 105)  BP: 123/73 (02-20-24 @ 08:30) (116/81 - 123/73)  BP(mean): --  RR: 18 (02-20-24 @ 08:30) (16 - 18)  SpO2: --

## 2024-02-20 NOTE — BH INPATIENT PSYCHIATRY PROGRESS NOTE - NSBHFUPINTERVALHXFT_PSY_A_CORE
Pt seen and evaluated, chart reviewed. As per nursing report, pt c/o difficulty sleeping, PRN trazodone given. On evaluation, pt presents anxious and cooperative. He reports he continues with distressing b/l hand tremors as well as daily nosebleeds. Discussed current medication regimen and alternative treatments, pt agreeable to crosstaper to lexapro, RABs and side effects discussed. He reports continued anxious ruminations with disrupted sleep and low mood in context of events leading to admission. He endorses motivation to attend more groups/DBT and work on coping skills. He denies AVH, paranoia. Denies SI/HI, intent and plan. Visible on unit and in behavioral control.

## 2024-02-21 LAB
GLUCOSE BLDC GLUCOMTR-MCNC: 154 MG/DL — HIGH (ref 70–99)
GLUCOSE BLDC GLUCOMTR-MCNC: 201 MG/DL — HIGH (ref 70–99)
GLUCOSE BLDC GLUCOMTR-MCNC: 239 MG/DL — HIGH (ref 70–99)
GLUCOSE BLDC GLUCOMTR-MCNC: 296 MG/DL — HIGH (ref 70–99)

## 2024-02-21 PROCEDURE — 99221 1ST HOSP IP/OBS SF/LOW 40: CPT | Mod: 25

## 2024-02-21 PROCEDURE — 11056 PARNG/CUTG B9 HYPRKR LES 2-4: CPT

## 2024-02-21 PROCEDURE — 99231 SBSQ HOSP IP/OBS SF/LOW 25: CPT

## 2024-02-21 PROCEDURE — 11721 DEBRIDE NAIL 6 OR MORE: CPT | Mod: 59

## 2024-02-21 RX ORDER — ESCITALOPRAM OXALATE 10 MG/1
10 TABLET, FILM COATED ORAL DAILY
Refills: 0 | Status: DISCONTINUED | OUTPATIENT
Start: 2024-02-22 | End: 2024-02-29

## 2024-02-21 RX ORDER — TRAZODONE HCL 50 MG
100 TABLET ORAL AT BEDTIME
Refills: 0 | Status: DISCONTINUED | OUTPATIENT
Start: 2024-02-21 | End: 2024-02-22

## 2024-02-21 RX ADMIN — ESCITALOPRAM OXALATE 5 MILLIGRAM(S): 10 TABLET, FILM COATED ORAL at 08:01

## 2024-02-21 RX ADMIN — Medication 1: at 11:36

## 2024-02-21 RX ADMIN — Medication 3: at 16:39

## 2024-02-21 RX ADMIN — Medication 8 UNIT(S): at 07:55

## 2024-02-21 RX ADMIN — INSULIN GLARGINE 12 UNIT(S): 100 INJECTION, SOLUTION SUBCUTANEOUS at 20:35

## 2024-02-21 RX ADMIN — Medication 8 UNIT(S): at 16:38

## 2024-02-21 RX ADMIN — Medication 8 UNIT(S): at 11:36

## 2024-02-21 RX ADMIN — Medication 2: at 07:55

## 2024-02-21 RX ADMIN — Medication 100 MILLIGRAM(S): at 20:36

## 2024-02-21 RX ADMIN — Medication 50 MILLIGRAM(S): at 20:36

## 2024-02-21 RX ADMIN — INSULIN GLARGINE 12 UNIT(S): 100 INJECTION, SOLUTION SUBCUTANEOUS at 07:59

## 2024-02-21 RX ADMIN — SERTRALINE 50 MILLIGRAM(S): 25 TABLET, FILM COATED ORAL at 08:01

## 2024-02-21 NOTE — BH INPATIENT PSYCHIATRY PROGRESS NOTE - NSBHFUPINTERVALHXFT_PSY_A_CORE
Pt seen and evaluated, chart reviewed. As per nursing report, pt c/o difficulty sleeping, several PRNs given. On evaluation, pt presents drowsy and in bed. He reports he had difficulty falling asleep overnight 2/2 anxious ruminations of current circumstances, states PRNs were helpful and is still feeling tired this morning. Otherwise he states he is the "same". Reports some improvement in mood and anxiety since admission. He also reports some improvement in hand tremors and nosebleeds with crosstaper of medications. He endorses motivation to attend more groups/DBT and work on coping skills. He denies AVH, paranoia. Denies SI/HI, intent and plan. Visible on unit and in behavioral control.   Spoke with pt's mother- updated on POC.

## 2024-02-21 NOTE — BH INPATIENT PSYCHIATRY PROGRESS NOTE - NSBHASSESSSUMMFT_PSY_ALL_CORE
27 y/o male, domiciled with mother, employed, single, no children, PMH of DM1, and PPH of ADHD and an unclear mood disorder, who was admitted to the ICU following an intentional overdose on his insulin in suicide attempt. He presented with ongoing symptoms of depression and anxiety, had not been taking care of himself and likely his health in the weeks leading up to the suicide attempt. Even if the overdose was impulsive, patient's premorbid functioning give the concern for worsening depression and anxiety. In addition, it is likely that his childhood experience of neglect has contributed to a maladaptive manner of dealing with relationships and concern for abandonment and a desire to be loved. He therefore seems to have significant difficulty coping with perceived rejection causing worsening depression and anxiety.     On evaluation, pt presents dysphoric and anxious, endorses anxious ruminations on events leading to admission and current circumstances resulting in disrupted sleep and low energy. He reports resolution of SI/I/P, appears more future-oriented and now endorsing concern on his current diabetes management. No overt psychosis noted. Pt initially started on zoloft, however during titration, pt reported new onset fine b/l hand tremors and daily nosebleeds. Discussed treatment options, pt agreed to crosstaper to lexapro. Pt has not been a behavioral concern, visible on unit and in groups/DBT.     #MDD; r/o bipolar d/o  -taper zoloft 50 mg daily **goal to crosstaper to lexapro  -titrate lexapro 10 mg daily   -titrate trazodone 100 mg qhs prn for insomnia  -c/w hydroxyzine 50 mg q6h prn for anxiety  -encourage groups/DBT    #DM1  -hospitalist consult  -endocrinology consult --> titrate lantus 12 units bid, c/w lispro 8 units tid with meals **consider lispro 10 units at dinner, SSI  -c/w FS achs, consistent carbs diet    #Agitation  -for agitation not amenable to verbal redirection, may give haldol 3 mg q6h prn and ativan 1 mg q6h prn with escalation to IM if pt is refusing PO and is an acute danger to self or/and others with repeat EKG to ensure QTc <500 ms 29 y/o male, domiciled with mother, employed, single, no children, PMH of DM1, and PPH of ADHD and an unclear mood disorder, who was admitted to the ICU following an intentional overdose on his insulin in suicide attempt. He presented with ongoing symptoms of depression and anxiety, had not been taking care of himself and likely his health in the weeks leading up to the suicide attempt. Even if the overdose was impulsive, patient's premorbid functioning give the concern for worsening depression and anxiety. In addition, it is likely that his childhood experience of neglect has contributed to a maladaptive manner of dealing with relationships and concern for abandonment and a desire to be loved. He therefore seems to have significant difficulty coping with perceived rejection causing worsening depression and anxiety.     On evaluation, pt presents dysphoric and anxious, endorses anxious ruminations on events leading to admission and current circumstances resulting in disrupted sleep and low energy. He reports resolution of SI/I/P, appears more future-oriented and now endorsing concern on his current diabetes management. No overt psychosis noted. Pt initially started on zoloft, however during titration, pt reported new onset fine b/l hand tremors and daily nosebleeds. Discussed treatment options, pt agreed to crosstaper to lexapro. Pt has not been a behavioral concern, visible on unit and in groups/DBT. PHQ9 from 14 on admission to 7 today.    #MDD; r/o bipolar d/o  -taper zoloft 50 mg daily **goal to crosstaper to lexapro  -titrate lexapro 10 mg daily   -titrate trazodone 100 mg qhs prn for insomnia  -c/w hydroxyzine 50 mg q6h prn for anxiety  -encourage groups/DBT    #DM1  -hospitalist consult  -endocrinology consult --> titrate lantus 12 units bid, c/w lispro 8 units tid with meals **consider lispro 10 units at dinner, SSI  -c/w FS achs, consistent carbs diet    #Agitation  -for agitation not amenable to verbal redirection, may give haldol 3 mg q6h prn and ativan 1 mg q6h prn with escalation to IM if pt is refusing PO and is an acute danger to self or/and others with repeat EKG to ensure QTc <500 ms

## 2024-02-21 NOTE — BH INPATIENT PSYCHIATRY PROGRESS NOTE - CURRENT MEDICATION
MEDICATIONS  (STANDING):  dextrose 5%. 1000 milliLiter(s) (50 mL/Hr) IV Continuous <Continuous>  dextrose 5%. 1000 milliLiter(s) (100 mL/Hr) IV Continuous <Continuous>  dextrose 50% Injectable 25 Gram(s) IV Push once  dextrose 50% Injectable 25 Gram(s) IV Push once  dextrose 50% Injectable 12.5 Gram(s) IV Push once  glucagon  Injectable 1 milliGRAM(s) IntraMuscular once  insulin glargine Injectable (LANTUS) 12 Unit(s) SubCutaneous two times a day  insulin lispro (ADMELOG) corrective regimen sliding scale   SubCutaneous three times a day before meals  insulin lispro Injectable (ADMELOG) 8 Unit(s) SubCutaneous three times a day before meals  sertraline 50 milliGRAM(s) Oral daily    MEDICATIONS  (PRN):  dextrose Oral Gel 15 Gram(s) Oral once PRN Blood Glucose LESS THAN 70 milliGRAM(s)/deciliter  haloperidol     Tablet 5 milliGRAM(s) Oral every 6 hours PRN agitation  hydrOXYzine hydrochloride 50 milliGRAM(s) Oral every 6 hours PRN anxiety  traZODone 100 milliGRAM(s) Oral at bedtime PRN insomnia   MEDICATIONS  (STANDING):  dextrose 5%. 1000 milliLiter(s) (50 mL/Hr) IV Continuous <Continuous>  dextrose 5%. 1000 milliLiter(s) (100 mL/Hr) IV Continuous <Continuous>  dextrose 50% Injectable 12.5 Gram(s) IV Push once  dextrose 50% Injectable 25 Gram(s) IV Push once  dextrose 50% Injectable 25 Gram(s) IV Push once  glucagon  Injectable 1 milliGRAM(s) IntraMuscular once  insulin glargine Injectable (LANTUS) 12 Unit(s) SubCutaneous two times a day  insulin lispro (ADMELOG) corrective regimen sliding scale   SubCutaneous three times a day before meals  insulin lispro Injectable (ADMELOG) 8 Unit(s) SubCutaneous three times a day before meals  sertraline 50 milliGRAM(s) Oral daily    MEDICATIONS  (PRN):  dextrose Oral Gel 15 Gram(s) Oral once PRN Blood Glucose LESS THAN 70 milliGRAM(s)/deciliter  haloperidol     Tablet 5 milliGRAM(s) Oral every 6 hours PRN agitation  hydrOXYzine hydrochloride 50 milliGRAM(s) Oral every 6 hours PRN anxiety  traZODone 100 milliGRAM(s) Oral at bedtime PRN insomnia

## 2024-02-21 NOTE — CONSULT NOTE ADULT - SUBJECTIVE AND OBJECTIVE BOX
24H events:    Patient is a 28y old Male  with Type 1 DM who presents with a chief complaint of suicide ideation (13 Feb 2024 14:55)    Primary diagnosis of    Today is hospital day 5d. This morning patient was seen and examined at bedside, resting comfortably in bed.    No acute or major events overnight.    PAST MEDICAL & SURGICAL HISTORY  Type 1 diabetes mellitus  Diagnosed in 2005, managed by Dr. Johnston    Hypercholesterolemia  currently on Lipitor      SOCIAL HISTORY:  Social History:      ALLERGIES:  No Known Allergies    MEDICATIONS:  STANDING MEDICATIONS  dextrose 5%. 1000 milliLiter(s) IV Continuous <Continuous>  dextrose 5%. 1000 milliLiter(s) IV Continuous <Continuous>  dextrose 50% Injectable 12.5 Gram(s) IV Push once  dextrose 50% Injectable 25 Gram(s) IV Push once  dextrose 50% Injectable 25 Gram(s) IV Push once  glucagon  Injectable 1 milliGRAM(s) IntraMuscular once  insulin glargine Injectable (LANTUS) 22 Unit(s) SubCutaneous every morning  insulin lispro (ADMELOG) corrective regimen sliding scale   SubCutaneous three times a day before meals  insulin lispro Injectable (ADMELOG) 8 Unit(s) SubCutaneous three times a day before meals  sertraline 125 milliGRAM(s) Oral daily    PRN MEDICATIONS  dextrose Oral Gel 15 Gram(s) Oral once PRN  haloperidol     Tablet 5 milliGRAM(s) Oral every 6 hours PRN  hydrOXYzine hydrochloride 50 milliGRAM(s) Oral every 6 hours PRN  LORazepam     Tablet 2 milliGRAM(s) Oral every 6 hours PRN  traZODone 50 milliGRAM(s) Oral at bedtime PRN    VITALS:   T(F): 96.6  HR: 96  BP: 133/85  RR: 18  SpO2: --    PHYSICAL EXAM:  GENERAL: NAD, well-groomed, well-developed  HEAD:  Atraumatic, Normocephalic  EYES: EOMI  NECK: Supple  NERVOUS SYSTEM:  Alert & Oriented X3, non focal   CHEST/LUNG: Clear to auscultation bilaterally; No rales, rhonchi, wheezing, or rubs  HEART: Regular rate and rhythm; No murmurs, rubs, or gallops  ABDOMEN: Soft, Nontender, Nondistended; Bowel sounds present  EXTREMITIES:  2+ Peripheral Pulses, No clubbing, cyanosis, or edema  LYMPH: No lymphadenopathy noted  SKIN: No rashes or lesions  LABS:                        RADIOLOGY:          
Podiatry Consult Note    Subjective:  CARA HUITRON is a 28 yr/o male who was seen bedside this morning for elongated nails and L sub hallux callus.   Patient is a T1 DM. States that he normally applies urea cream to the callus at home which helps.   Denies any tingling, numbness, or burning sensations to the bilateral lower extremities.       HPI:      Past Medical History and Surgical History  PAST MEDICAL & SURGICAL HISTORY:  Type 1 diabetes mellitus  Diagnosed in 2005, managed by Dr. Johnston      Hypercholesterolemia  currently on Lipitor           Review of Systems:  [X] Ten point review of systems is otherwise negative except as noted     Objective:  Vital Signs Last 24 Hrs  T(C): 36.1 (20 Feb 2024 16:00), Max: 36.1 (20 Feb 2024 15:00)  T(F): 97 (20 Feb 2024 16:00), Max: 97 (20 Feb 2024 15:00)  HR: 103 (20 Feb 2024 16:00) (103 - 103)  BP: 141/88 (20 Feb 2024 16:00) (141/88 - 142/88)  BP(mean): --  RR: 16 (20 Feb 2024 16:00) (16 - 16)  SpO2: --                              Physical Exam - Lower Extremity Focused:   Derm: Elongated nails x10. Callus on the sub IPJ L hallux with minimal bleeding, no signs of infection.   Vascular: DP and PT Pulses 2/4; Foot is Warm to Warm to the touch; Capillary Refill Time < 3 Seconds;    Neuro: Protective Sensation Intact  MSK: No Pain On Palpation at Wound Site     Assessment:  Elongated Nails - x10  HPK - L hallux    Plan:  Chart reviewed and Patient evaluated. All Questions and Concerns Addressed and Answered  Aseptic debridement of nails with sterile nail nipper x10, WOI.   Trimming of thickened skin with sterile nippers, WOI. Dressed area with bacitracin and bandaid. No signs of infection.   Weight Bearing Status; WBAT   Patient stable from podiatry standpoint.   Patient to follow up in Dr. Cox's outpatient clinic.   Discussed Plan w/ Dr. Cox    Podiatry 
S :   28y year old Male seen at bedside for diabetic risk foot assessment with a chief complaint of   painful thickened, dystrophic, ingrowing  and long toenails digits 1-5 bilaterally  and preventative foot examination Patient denies any history o f trauma to both feet.  Patient has no other pedal complaints.  Patient is experiencing pain while standing, walking and in shoe gear.       Patient admits to  (-) Fevers, (-) Chills, (-) Nausea, (-) Vomiting, (-) Shortness of Breath (-) calf pain (-) chest pain       PMH: Type 1 diabetes mellitus    Hypercholesterolemia      PSH:    Allergies:No Known Allergies      Labs:      WBC Trend  5.33 Date (02-09 @ 04:23)  4.88 Date (02-08 @ 11:47)  4.51<L> Date (02-08 @ 04:30)  6.82 Date (02-07 @ 21:42)      Chem              T(F): 97 (02-20-24 @ 16:00), Max: 97.1 (02-20-24 @ 08:30)  HR: 103 (02-20-24 @ 16:00) (103 - 105)  BP: 141/88 (02-20-24 @ 16:00) (123/73 - 142/88)  RR: 16 (02-20-24 @ 16:00) (16 - 18)  SpO2: --  Wt(kg): --    O:   Integument:  Skin warm, dry and supple bilateral.  No open lesions or inter-digital macerations noted bilateral.   Toenails 1-5 Right and Left feet thickened, elongated, discolored, and dystrophic with subungual debris. There is pain upon palpation of all fungal and ingrowing nails 1-5 bilaterally.   Calluses x2   Vascular: Dorsalis Pedis and Posterior Tibial pulses 2/4.  Capillary re-fill time less than 3 seconds digits 1-5 bilateral.   Neuro: Protective sensation intact to the level of the digits bilateral.  MSK: Muscle strength 5/5 all major muscle groups bilateral. No structural abnormality, bilaterally      A:   1. bilateral hypertrohic Onychomycosis digits 1-5   2. Pain from Elongated nails, ingrowing  and dystrophic nails  3 Calluses x2   P: Discussed diagnosis and treatment with patient  Aseptic debridement and curretage  of all fungal and ingrowing  nails 1-5 bilateral with sterile nail pack  Dbx of calluses   Discussed importance of daily foot examinations and proper shoe gear  Please re-consult as needed.

## 2024-02-21 NOTE — BH INPATIENT PSYCHIATRY PROGRESS NOTE - NSBHCHARTREVIEWVS_PSY_A_CORE FT
Vital Signs Last 24 Hrs  T(C): 36.1 (02-20-24 @ 16:00), Max: 36.1 (02-20-24 @ 15:00)  T(F): 97 (02-20-24 @ 16:00), Max: 97 (02-20-24 @ 15:00)  HR: 76 (02-21-24 @ 08:56) (76 - 103)  BP: 117/76 (02-21-24 @ 08:56) (117/76 - 142/88)  BP(mean): --  RR: 16 (02-21-24 @ 08:56) (16 - 16)  SpO2: --

## 2024-02-21 NOTE — CONSULT NOTE ADULT - CONSULT REQUESTED DATE/TIME
17-Feb-2024 16:12
21-Feb-2024 08:09
21-Feb-2024 08:50
E: PRN; replete K<4, Mg <2  N: Regular  DVT: Lovenox  Full Code  Dispo: [facility]

## 2024-02-22 DIAGNOSIS — T50.901A POISONING BY UNSPECIFIED DRUGS, MEDICAMENTS AND BIOLOGICAL SUBSTANCES, ACCIDENTAL (UNINTENTIONAL), INITIAL ENCOUNTER: ICD-10-CM

## 2024-02-22 LAB
GLUCOSE BLDC GLUCOMTR-MCNC: 140 MG/DL — HIGH (ref 70–99)
GLUCOSE BLDC GLUCOMTR-MCNC: 151 MG/DL — HIGH (ref 70–99)
GLUCOSE BLDC GLUCOMTR-MCNC: 240 MG/DL — HIGH (ref 70–99)
GLUCOSE BLDC GLUCOMTR-MCNC: 242 MG/DL — HIGH (ref 70–99)

## 2024-02-22 PROCEDURE — 99231 SBSQ HOSP IP/OBS SF/LOW 25: CPT

## 2024-02-22 RX ORDER — MIRTAZAPINE 45 MG/1
7.5 TABLET, ORALLY DISINTEGRATING ORAL AT BEDTIME
Refills: 0 | Status: DISCONTINUED | OUTPATIENT
Start: 2024-02-22 | End: 2024-02-23

## 2024-02-22 RX ADMIN — Medication 8 UNIT(S): at 16:37

## 2024-02-22 RX ADMIN — ESCITALOPRAM OXALATE 10 MILLIGRAM(S): 10 TABLET, FILM COATED ORAL at 09:40

## 2024-02-22 RX ADMIN — Medication 8 UNIT(S): at 11:50

## 2024-02-22 RX ADMIN — Medication 2: at 07:28

## 2024-02-22 RX ADMIN — MIRTAZAPINE 7.5 MILLIGRAM(S): 45 TABLET, ORALLY DISINTEGRATING ORAL at 21:02

## 2024-02-22 RX ADMIN — INSULIN GLARGINE 12 UNIT(S): 100 INJECTION, SOLUTION SUBCUTANEOUS at 07:31

## 2024-02-22 RX ADMIN — Medication 50 MILLIGRAM(S): at 21:03

## 2024-02-22 RX ADMIN — Medication 8 UNIT(S): at 07:28

## 2024-02-22 RX ADMIN — SERTRALINE 50 MILLIGRAM(S): 25 TABLET, FILM COATED ORAL at 09:40

## 2024-02-22 RX ADMIN — INSULIN GLARGINE 12 UNIT(S): 100 INJECTION, SOLUTION SUBCUTANEOUS at 21:03

## 2024-02-22 RX ADMIN — Medication 2: at 11:50

## 2024-02-22 NOTE — BH INPATIENT PSYCHIATRY PROGRESS NOTE - NSBHMETABOLIC_PSY_ALL_CORE_FT
BMI: BMI (kg/m2): 21.1 (02-12-24 @ 14:55)  HbA1c: A1C with Estimated Average Glucose Result: 10.8 % (02-09-24 @ 04:23)    Glucose: POCT Blood Glucose.: 242 mg/dL (02-22-24 @ 11:25)    BP: 120/72 (02-22-24 @ 08:43) (116/81 - 145/89)Vital Signs Last 24 Hrs  T(C): 36 (02-22-24 @ 08:43), Max: 36 (02-21-24 @ 15:35)  T(F): 96.8 (02-22-24 @ 08:43), Max: 96.8 (02-21-24 @ 15:35)  HR: 97 (02-22-24 @ 08:43) (94 - 97)  BP: 120/72 (02-22-24 @ 08:43) (120/72 - 145/89)  BP(mean): --  RR: 18 (02-22-24 @ 08:43) (16 - 18)  SpO2: --      Lipid Panel: Date/Time: 02-08-24 @ 04:30  Cholesterol, Serum: 191  LDL Cholesterol Calculated: 91  HDL Cholesterol, Serum: 49  Total Cholesterol/HDL Ration Measurement: --  Triglycerides, Serum: 252

## 2024-02-22 NOTE — BH INPATIENT PSYCHIATRY PROGRESS NOTE - CURRENT MEDICATION
MEDICATIONS  (STANDING):  dextrose 5%. 1000 milliLiter(s) (100 mL/Hr) IV Continuous <Continuous>  dextrose 5%. 1000 milliLiter(s) (50 mL/Hr) IV Continuous <Continuous>  dextrose 50% Injectable 12.5 Gram(s) IV Push once  dextrose 50% Injectable 25 Gram(s) IV Push once  dextrose 50% Injectable 25 Gram(s) IV Push once  escitalopram 10 milliGRAM(s) Oral daily  glucagon  Injectable 1 milliGRAM(s) IntraMuscular once  insulin glargine Injectable (LANTUS) 12 Unit(s) SubCutaneous two times a day  insulin lispro (ADMELOG) corrective regimen sliding scale   SubCutaneous three times a day before meals  insulin lispro Injectable (ADMELOG) 8 Unit(s) SubCutaneous three times a day before meals  mirtazapine 7.5 milliGRAM(s) Oral at bedtime    MEDICATIONS  (PRN):  dextrose Oral Gel 15 Gram(s) Oral once PRN Blood Glucose LESS THAN 70 milliGRAM(s)/deciliter  haloperidol     Tablet 5 milliGRAM(s) Oral every 6 hours PRN agitation  hydrOXYzine hydrochloride 50 milliGRAM(s) Oral every 6 hours PRN anxiety

## 2024-02-22 NOTE — BH INPATIENT PSYCHIATRY PROGRESS NOTE - ATTENDING COMMENTS
I have reviewed case with PNP, made any necessary revisions and concur with findings and plans. 
I have reviewed case with PNP, made any necessary revisions and concur with findings and plans. 
I have reviewed case with PNP, made any necessary revisions and concur with findings and plans
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
## 2024-02-22 NOTE — BH INPATIENT PSYCHIATRY PROGRESS NOTE - NSBHFUPINTERVALHXFT_PSY_A_CORE
Pt seen and evaluated, chart reviewed. As per nursing report, pt c/o difficulty sleeping, several PRNs given. On evaluation, pt presents drowsy and cooperative. He reports he had difficulty falling asleep overnight 2/2 anxious ruminations of current circumstances, states PRNs were only a little helpful and now feeling tired. Pt requesting for alternative sleep aid; discussed indication and RABs of remeron, pt agreeable. Otherwise he states he is feels "the same". He reports some improvement in mood and anxiety, although continues with disrupted sleep and appetite. He endorses motivation to attend more groups/DBT and work on coping skills. He denies AVH, paranoia. Denies SI/HI, intent and plan. Visible on unit and in behavioral control. He is adherent to medications, denies negative side effects.   Spoke with pt's mother- updated on POC.  Pt seen and evaluated, chart reviewed. As per nursing report, pt c/o difficulty sleeping, several PRNs given. On evaluation, pt presents drowsy and cooperative. He reports he had difficulty falling asleep overnight 2/2 anxious ruminations of current circumstances, states PRNs were only a little helpful and currently feeling tired. Pt requesting for alternative sleep aid; discussed indication and RABs of remeron, pt agreeable. Otherwise he states he is feels "the same". He reports some improvement in mood and anxiety, although continues with disrupted sleep and appetite. He endorses motivation to attend more groups/DBT and work on coping skills. He denies AVH, paranoia. Denies SI/HI, intent and plan. Visible on unit and in behavioral control. He is adherent to medications, denies negative side effects.

## 2024-02-22 NOTE — BH INPATIENT PSYCHIATRY PROGRESS NOTE - NSBHCHARTREVIEWVS_PSY_A_CORE FT
Vital Signs Last 24 Hrs  T(C): 36 (02-22-24 @ 08:43), Max: 36 (02-21-24 @ 15:35)  T(F): 96.8 (02-22-24 @ 08:43), Max: 96.8 (02-21-24 @ 15:35)  HR: 97 (02-22-24 @ 08:43) (94 - 97)  BP: 120/72 (02-22-24 @ 08:43) (120/72 - 145/89)  BP(mean): --  RR: 18 (02-22-24 @ 08:43) (16 - 18)  SpO2: --

## 2024-02-23 LAB
GLUCOSE BLDC GLUCOMTR-MCNC: 107 MG/DL — HIGH (ref 70–99)
GLUCOSE BLDC GLUCOMTR-MCNC: 268 MG/DL — HIGH (ref 70–99)
GLUCOSE BLDC GLUCOMTR-MCNC: 323 MG/DL — HIGH (ref 70–99)
GLUCOSE BLDC GLUCOMTR-MCNC: 78 MG/DL — SIGNIFICANT CHANGE UP (ref 70–99)

## 2024-02-23 PROCEDURE — 99231 SBSQ HOSP IP/OBS SF/LOW 25: CPT

## 2024-02-23 RX ORDER — MIRTAZAPINE 45 MG/1
15 TABLET, ORALLY DISINTEGRATING ORAL AT BEDTIME
Refills: 0 | Status: DISCONTINUED | OUTPATIENT
Start: 2024-02-23 | End: 2024-02-29

## 2024-02-23 RX ADMIN — Medication 8 UNIT(S): at 07:26

## 2024-02-23 RX ADMIN — Medication 4: at 07:26

## 2024-02-23 RX ADMIN — MIRTAZAPINE 15 MILLIGRAM(S): 45 TABLET, ORALLY DISINTEGRATING ORAL at 20:13

## 2024-02-23 RX ADMIN — INSULIN GLARGINE 12 UNIT(S): 100 INJECTION, SOLUTION SUBCUTANEOUS at 07:27

## 2024-02-23 RX ADMIN — Medication 8 UNIT(S): at 16:13

## 2024-02-23 RX ADMIN — INSULIN GLARGINE 12 UNIT(S): 100 INJECTION, SOLUTION SUBCUTANEOUS at 21:04

## 2024-02-23 RX ADMIN — ESCITALOPRAM OXALATE 10 MILLIGRAM(S): 10 TABLET, FILM COATED ORAL at 08:18

## 2024-02-23 RX ADMIN — Medication 8 UNIT(S): at 11:51

## 2024-02-23 RX ADMIN — Medication 3: at 16:13

## 2024-02-23 NOTE — BH INPATIENT PSYCHIATRY PROGRESS NOTE - NSBHADMITMEDEDUDETAILS_A_CORE FT
Educated on risks and benefits, and side effects profile, of lexapro. Pt verbalized understanding.  Educated on risks and benefits, and side effects profile, of remeron. Pt verbalized understanding.

## 2024-02-23 NOTE — BH INPATIENT PSYCHIATRY PROGRESS NOTE - NSBHFUPINTERVALHXFT_PSY_A_CORE
Pt seen and evaluated, chart reviewed. As per nursing report, pt c/o difficulty sleeping, PRN hydroxyzine given. On evaluation, pt presents with hickman affect than prior evaluations, calm and well-related. He reports he is feeling "somewhat better". He reports some improvement in ability to fall and maintain sleep last night. He reports some improvement in anxious ruminations, states effective coping skills include working out and talking to others. He endorses motivation to attend more groups/DBT and work on coping skills. He denies AVH, paranoia. Denies SI/HI, intent and plan. He is adherent to medications, denies negative side effects. Visible on unit and in behavioral control.

## 2024-02-23 NOTE — BH INPATIENT PSYCHIATRY PROGRESS NOTE - NSBHMETABOLIC_PSY_ALL_CORE_FT
BMI: BMI (kg/m2): 21.1 (02-12-24 @ 14:55)  HbA1c: A1C with Estimated Average Glucose Result: 10.8 % (02-09-24 @ 04:23)    Glucose: POCT Blood Glucose.: 323 mg/dL (02-23-24 @ 06:42)    BP: 129/81 (02-23-24 @ 08:08) (117/76 - 145/89)Vital Signs Last 24 Hrs  T(C): 35.6 (02-23-24 @ 08:08), Max: 35.7 (02-22-24 @ 15:55)  T(F): 96 (02-23-24 @ 08:08), Max: 96.3 (02-22-24 @ 15:55)  HR: 79 (02-23-24 @ 08:08) (79 - 95)  BP: 129/81 (02-23-24 @ 08:08) (124/94 - 129/81)  BP(mean): --  RR: 16 (02-23-24 @ 08:08) (16 - 18)  SpO2: --      Lipid Panel: Date/Time: 02-08-24 @ 04:30  Cholesterol, Serum: 191  LDL Cholesterol Calculated: 91  HDL Cholesterol, Serum: 49  Total Cholesterol/HDL Ration Measurement: --  Triglycerides, Serum: 252   BMI: BMI (kg/m2): 21.1 (02-12-24 @ 14:55)  HbA1c: A1C with Estimated Average Glucose Result: 10.8 % (02-09-24 @ 04:23)    Glucose: POCT Blood Glucose.: 78 mg/dL (02-23-24 @ 11:18)    BP: 129/81 (02-23-24 @ 08:08) (117/76 - 145/89)Vital Signs Last 24 Hrs  T(C): 35.6 (02-23-24 @ 08:08), Max: 35.7 (02-22-24 @ 15:55)  T(F): 96 (02-23-24 @ 08:08), Max: 96.3 (02-22-24 @ 15:55)  HR: 79 (02-23-24 @ 08:08) (79 - 95)  BP: 129/81 (02-23-24 @ 08:08) (124/94 - 129/81)  BP(mean): --  RR: 16 (02-23-24 @ 08:08) (16 - 18)  SpO2: --      Lipid Panel: Date/Time: 02-08-24 @ 04:30  Cholesterol, Serum: 191  LDL Cholesterol Calculated: 91  HDL Cholesterol, Serum: 49  Total Cholesterol/HDL Ration Measurement: --  Triglycerides, Serum: 252
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
## 2024-02-23 NOTE — BH INPATIENT PSYCHIATRY PROGRESS NOTE - CURRENT MEDICATION
MEDICATIONS  (STANDING):  dextrose 5%. 1000 milliLiter(s) (50 mL/Hr) IV Continuous <Continuous>  dextrose 5%. 1000 milliLiter(s) (100 mL/Hr) IV Continuous <Continuous>  dextrose 50% Injectable 25 Gram(s) IV Push once  dextrose 50% Injectable 25 Gram(s) IV Push once  dextrose 50% Injectable 12.5 Gram(s) IV Push once  escitalopram 10 milliGRAM(s) Oral daily  glucagon  Injectable 1 milliGRAM(s) IntraMuscular once  insulin glargine Injectable (LANTUS) 12 Unit(s) SubCutaneous two times a day  insulin lispro (ADMELOG) corrective regimen sliding scale   SubCutaneous three times a day before meals  insulin lispro Injectable (ADMELOG) 8 Unit(s) SubCutaneous three times a day before meals  mirtazapine 15 milliGRAM(s) Oral at bedtime    MEDICATIONS  (PRN):  dextrose Oral Gel 15 Gram(s) Oral once PRN Blood Glucose LESS THAN 70 milliGRAM(s)/deciliter  haloperidol     Tablet 5 milliGRAM(s) Oral every 6 hours PRN agitation  hydrOXYzine hydrochloride 50 milliGRAM(s) Oral every 6 hours PRN anxiety   MEDICATIONS  (STANDING):  dextrose 5%. 1000 milliLiter(s) (100 mL/Hr) IV Continuous <Continuous>  dextrose 5%. 1000 milliLiter(s) (50 mL/Hr) IV Continuous <Continuous>  dextrose 50% Injectable 12.5 Gram(s) IV Push once  dextrose 50% Injectable 25 Gram(s) IV Push once  dextrose 50% Injectable 25 Gram(s) IV Push once  escitalopram 10 milliGRAM(s) Oral daily  glucagon  Injectable 1 milliGRAM(s) IntraMuscular once  insulin glargine Injectable (LANTUS) 12 Unit(s) SubCutaneous two times a day  insulin lispro (ADMELOG) corrective regimen sliding scale   SubCutaneous three times a day before meals  insulin lispro Injectable (ADMELOG) 8 Unit(s) SubCutaneous three times a day before meals  mirtazapine 15 milliGRAM(s) Oral at bedtime    MEDICATIONS  (PRN):  dextrose Oral Gel 15 Gram(s) Oral once PRN Blood Glucose LESS THAN 70 milliGRAM(s)/deciliter  haloperidol     Tablet 5 milliGRAM(s) Oral every 6 hours PRN agitation  hydrOXYzine hydrochloride 50 milliGRAM(s) Oral every 6 hours PRN anxiety

## 2024-02-23 NOTE — BH INPATIENT PSYCHIATRY PROGRESS NOTE - NSBHCHARTREVIEWVS_PSY_A_CORE FT
Vital Signs Last 24 Hrs  T(C): 35.6 (02-23-24 @ 08:08), Max: 35.7 (02-22-24 @ 15:55)  T(F): 96 (02-23-24 @ 08:08), Max: 96.3 (02-22-24 @ 15:55)  HR: 79 (02-23-24 @ 08:08) (79 - 95)  BP: 129/81 (02-23-24 @ 08:08) (124/94 - 129/81)  BP(mean): --  RR: 16 (02-23-24 @ 08:08) (16 - 18)  SpO2: --

## 2024-02-24 LAB
GLUCOSE BLDC GLUCOMTR-MCNC: 126 MG/DL — HIGH (ref 70–99)
GLUCOSE BLDC GLUCOMTR-MCNC: 136 MG/DL — HIGH (ref 70–99)
GLUCOSE BLDC GLUCOMTR-MCNC: 172 MG/DL — HIGH (ref 70–99)
GLUCOSE BLDC GLUCOMTR-MCNC: 249 MG/DL — HIGH (ref 70–99)

## 2024-02-24 RX ORDER — INSULIN LISPRO 100/ML
3 VIAL (ML) SUBCUTANEOUS ONCE
Refills: 0 | Status: COMPLETED | OUTPATIENT
Start: 2024-02-24 | End: 2024-02-24

## 2024-02-24 RX ADMIN — Medication 3 UNIT(S): at 20:43

## 2024-02-24 RX ADMIN — Medication 8 UNIT(S): at 16:39

## 2024-02-24 RX ADMIN — Medication 8 UNIT(S): at 11:46

## 2024-02-24 RX ADMIN — INSULIN GLARGINE 12 UNIT(S): 100 INJECTION, SOLUTION SUBCUTANEOUS at 21:08

## 2024-02-24 RX ADMIN — ESCITALOPRAM OXALATE 10 MILLIGRAM(S): 10 TABLET, FILM COATED ORAL at 08:05

## 2024-02-24 RX ADMIN — Medication 8 UNIT(S): at 07:45

## 2024-02-24 RX ADMIN — INSULIN GLARGINE 12 UNIT(S): 100 INJECTION, SOLUTION SUBCUTANEOUS at 07:45

## 2024-02-24 RX ADMIN — MIRTAZAPINE 15 MILLIGRAM(S): 45 TABLET, ORALLY DISINTEGRATING ORAL at 20:41

## 2024-02-24 RX ADMIN — Medication 1: at 16:40

## 2024-02-25 LAB
GLUCOSE BLDC GLUCOMTR-MCNC: 154 MG/DL — HIGH (ref 70–99)
GLUCOSE BLDC GLUCOMTR-MCNC: 159 MG/DL — HIGH (ref 70–99)
GLUCOSE BLDC GLUCOMTR-MCNC: 215 MG/DL — HIGH (ref 70–99)
GLUCOSE BLDC GLUCOMTR-MCNC: 287 MG/DL — HIGH (ref 70–99)
GLUCOSE BLDC GLUCOMTR-MCNC: 74 MG/DL — SIGNIFICANT CHANGE UP (ref 70–99)

## 2024-02-25 RX ADMIN — INSULIN GLARGINE 12 UNIT(S): 100 INJECTION, SOLUTION SUBCUTANEOUS at 21:00

## 2024-02-25 RX ADMIN — Medication 3: at 07:42

## 2024-02-25 RX ADMIN — INSULIN GLARGINE 12 UNIT(S): 100 INJECTION, SOLUTION SUBCUTANEOUS at 07:43

## 2024-02-25 RX ADMIN — MIRTAZAPINE 15 MILLIGRAM(S): 45 TABLET, ORALLY DISINTEGRATING ORAL at 20:30

## 2024-02-25 RX ADMIN — Medication 1: at 11:56

## 2024-02-25 RX ADMIN — Medication 1: at 16:38

## 2024-02-25 RX ADMIN — Medication 8 UNIT(S): at 07:42

## 2024-02-25 RX ADMIN — Medication 8 UNIT(S): at 16:37

## 2024-02-25 RX ADMIN — Medication 8 UNIT(S): at 11:56

## 2024-02-25 RX ADMIN — ESCITALOPRAM OXALATE 10 MILLIGRAM(S): 10 TABLET, FILM COATED ORAL at 08:01

## 2024-02-26 LAB
GLUCOSE BLDC GLUCOMTR-MCNC: 149 MG/DL — HIGH (ref 70–99)
GLUCOSE BLDC GLUCOMTR-MCNC: 211 MG/DL — HIGH (ref 70–99)
GLUCOSE BLDC GLUCOMTR-MCNC: 220 MG/DL — HIGH (ref 70–99)
GLUCOSE BLDC GLUCOMTR-MCNC: 337 MG/DL — HIGH (ref 70–99)

## 2024-02-26 PROCEDURE — 99231 SBSQ HOSP IP/OBS SF/LOW 25: CPT

## 2024-02-26 RX ORDER — INSULIN GLARGINE 100 [IU]/ML
16 INJECTION, SOLUTION SUBCUTANEOUS
Refills: 0 | Status: DISCONTINUED | OUTPATIENT
Start: 2024-02-26 | End: 2024-02-27

## 2024-02-26 RX ADMIN — MIRTAZAPINE 15 MILLIGRAM(S): 45 TABLET, ORALLY DISINTEGRATING ORAL at 21:07

## 2024-02-26 RX ADMIN — Medication 2: at 16:04

## 2024-02-26 RX ADMIN — INSULIN GLARGINE 16 UNIT(S): 100 INJECTION, SOLUTION SUBCUTANEOUS at 21:06

## 2024-02-26 RX ADMIN — Medication 8 UNIT(S): at 16:04

## 2024-02-26 RX ADMIN — ESCITALOPRAM OXALATE 10 MILLIGRAM(S): 10 TABLET, FILM COATED ORAL at 08:37

## 2024-02-26 RX ADMIN — Medication 8 UNIT(S): at 07:29

## 2024-02-26 RX ADMIN — Medication 4: at 07:30

## 2024-02-26 RX ADMIN — Medication 8 UNIT(S): at 11:29

## 2024-02-26 RX ADMIN — INSULIN GLARGINE 12 UNIT(S): 100 INJECTION, SOLUTION SUBCUTANEOUS at 07:31

## 2024-02-26 RX ADMIN — Medication 2: at 11:29

## 2024-02-26 NOTE — BH INPATIENT PSYCHIATRY DISCHARGE NOTE - NSBHFUPINTERVALHXFT_PSY_A_CORE
Pt seen and evaluated, chart reviewed. As per nursing report, no acute events overnight, no PRNs. On evaluation, pt presents pleasant and cooperative, with hickman affect, calm and well-related. He reports he is feeling better. He attributes improvement to decrease in anxious ruminations and improved sleep, states he is more easily able to fall and maintain sleep. He endorses improved energy and motivation, reports plan to attend more groups/DBT and work on coping skills. He denies AVH, paranoia. Denies SI/HI, intent and plan. 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 He is adherent to medications, denies negative side effects. Visible on unit and in behavioral control.  Spoke with pt's mother, Araceli- updated on POC, she denies safety concerns on discharge planning.

## 2024-02-26 NOTE — BH INPATIENT PSYCHIATRY DISCHARGE NOTE - NSBHDCMEDICALFT_PSY_A_CORE
#DM1  -hospitalist consult  -endocrinology consult --> titrate lantus 12 units bid, c/w lispro 8 units tid with meals **reconsulted, titrated lantus 16 units bid (2/26)  -c/w FS achs, consistent carbs diet #DM1  -hospitalist consult  -endocrinology consult --> titrate lantus 12 units bid, c/w lispro 8 units tid with meals **2/23, titrated lantus 16 units bid **2/27 "Increase Lantus to 20 units q12hrs; no change in Lispro 8 units tid ac.  Can go home on this regimen and f/u with dr Neal as outpatient in Endo clinic" **on 2/28, reconsulted for fluctuating FS, c/w lantus 20 mg bid and decrease lispro 6 units tid with meals, f/u OP  -c/w FS achs, consistent carbs diet

## 2024-02-26 NOTE — BH INPATIENT PSYCHIATRY DISCHARGE NOTE - NSDCCPCAREPLAN_GEN_ALL_CORE_FT
PRINCIPAL DISCHARGE DIAGNOSIS  Diagnosis: MDD (major depressive disorder), recurrent episode  Assessment and Plan of Treatment:      PRINCIPAL DISCHARGE DIAGNOSIS  Diagnosis: MDD (major depressive disorder), recurrent episode  Assessment and Plan of Treatment: Continue current medication regimen and follow up with aftercare appointments
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
## 2024-02-26 NOTE — BH INPATIENT PSYCHIATRY DISCHARGE NOTE - HOSPITAL COURSE
Pt has made significant progress over the course of hospitalization. With continuous psychotherapy from the treatment team and the medications, he reports feeling better, sleeping and eating well. Medications adjusted as follows- initially started on zoloft and titrated to 150 mg daily, however pt with nosebleeds and hand tremors with titration; crosstapered from zoloft to lexapro 10 mg daily with resolution of ADRs. Initially started on trazodone 100 mg qhs for insomnia with limited response, discontinued and changed to remeron 15 mg qhs with improved response. Pt tolerated medications well, denied negative side effects. Thought process and insight improved. Pt was calm and cooperative and was in good behavioral control. Denied any suicidal or homicidal ideations. Denied any auditory or visual hallucinations. Pt with good ADLs.  Pt able to vocalize and utilize prosocial behaviors to address needs, and engage appropriately with staff and group milieu. Abnormalities in mental status improved sufficiently to permit outgoing care in the outpatient setting. Pt was evaluated by treatment team, pt is stable for discharge and shows no imminent danger to self, others or property at this time. He understands and agrees with treatment plan and following up with outpatient. Psychoeducation provided regarding diagnosis, medications, treatment and follow up. Risks, benefits, alternatives discussed, all questions and concerns addressed and answered. Reviewed crisis intervention with verbalized understanding. Pt has made significant progress over the course of hospitalization. With continuous psychotherapy from the treatment team and the medications, he reports feeling better, sleeping and eating well. Medications adjusted as follows- initially started on zoloft and titrated to 150 mg daily, however pt with nosebleeds and hand tremors with titration; crosstapered from zoloft to lexapro 10 mg daily with resolution of ADRs. Initially started on trazodone 100 mg qhs for insomnia with limited response, discontinued and changed to remeron 15 mg qhs for insomnia and augmentation with improved response. Pt tolerated medications well, denied negative side effects. Thought process and insight improved. Pt was calm and cooperative and was in good behavioral control. Denied any suicidal or homicidal ideations. Denied any auditory or visual hallucinations. Pt with good ADLs.  Pt able to vocalize and utilize prosocial behaviors to address needs, and engage appropriately with staff and group milieu. Abnormalities in mental status improved sufficiently to permit outgoing care in the outpatient setting. Pt was evaluated by treatment team, pt is stable for discharge and shows no imminent danger to self, others or property at this time. He understands and agrees with treatment plan and following up with outpatient. Psychoeducation provided regarding diagnosis, medications, treatment and follow up. Risks, benefits, alternatives discussed, all questions and concerns addressed and answered. Reviewed crisis intervention with verbalized understanding.

## 2024-02-26 NOTE — BH INPATIENT PSYCHIATRY PROGRESS NOTE - NSBHCHARTREVIEWVS_PSY_A_CORE FT
Vital Signs Last 24 Hrs  T(C): 35.9 (02-26-24 @ 08:00), Max: 35.9 (02-26-24 @ 08:00)  T(F): 96.6 (02-26-24 @ 08:00), Max: 96.6 (02-26-24 @ 08:00)  HR: 106 (02-26-24 @ 08:00) (99 - 106)  BP: 136/83 (02-26-24 @ 08:00) (136/83 - 136/83)  BP(mean): --  RR: 16 (02-26-24 @ 08:00) (16 - 16)  SpO2: --
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
## 2024-02-26 NOTE — BH INPATIENT PSYCHIATRY DISCHARGE NOTE - DESCRIPTION
Patient reports that he grew up in [place], completed high school . 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 he reports after he got better , the refused to let him back in school as it was considered that he dropped out .

## 2024-02-26 NOTE — BH INPATIENT PSYCHIATRY PROGRESS NOTE - NSBHMETABOLIC_PSY_ALL_CORE_FT
BMI: BMI (kg/m2): 21.1 (02-12-24 @ 14:55)  HbA1c: A1C with Estimated Average Glucose Result: 10.8 % (02-09-24 @ 04:23)    Glucose: POCT Blood Glucose.: 337 mg/dL (02-26-24 @ 06:35)    BP: 136/83 (02-26-24 @ 08:00) (117/78 - 137/80)Vital Signs Last 24 Hrs  T(C): 35.9 (02-26-24 @ 08:00), Max: 35.9 (02-26-24 @ 08:00)  T(F): 96.6 (02-26-24 @ 08:00), Max: 96.6 (02-26-24 @ 08:00)  HR: 106 (02-26-24 @ 08:00) (99 - 106)  BP: 136/83 (02-26-24 @ 08:00) (136/83 - 136/83)  BP(mean): --  RR: 16 (02-26-24 @ 08:00) (16 - 16)  SpO2: --      Lipid Panel: Date/Time: 02-08-24 @ 04:30  Cholesterol, Serum: 191  LDL Cholesterol Calculated: 91  HDL Cholesterol, Serum: 49  Total Cholesterol/HDL Ration Measurement: --  Triglycerides, Serum: 252   BMI: BMI (kg/m2): 21.1 (02-12-24 @ 14:55)  HbA1c: A1C with Estimated Average Glucose Result: 10.8 % (02-09-24 @ 04:23)    Glucose: POCT Blood Glucose.: 211 mg/dL (02-26-24 @ 11:22)    BP: 136/83 (02-26-24 @ 08:00) (117/78 - 137/80)Vital Signs Last 24 Hrs  T(C): 35.9 (02-26-24 @ 08:00), Max: 35.9 (02-26-24 @ 08:00)  T(F): 96.6 (02-26-24 @ 08:00), Max: 96.6 (02-26-24 @ 08:00)  HR: 106 (02-26-24 @ 08:00) (99 - 106)  BP: 136/83 (02-26-24 @ 08:00) (136/83 - 136/83)  BP(mean): --  RR: 16 (02-26-24 @ 08:00) (16 - 16)  SpO2: --      Lipid Panel: Date/Time: 02-08-24 @ 04:30  Cholesterol, Serum: 191  LDL Cholesterol Calculated: 91  HDL Cholesterol, Serum: 49  Total Cholesterol/HDL Ration Measurement: --  Triglycerides, Serum: 252

## 2024-02-26 NOTE — BH INPATIENT PSYCHIATRY PROGRESS NOTE - NSBHFUPINTERVALHXFT_PSY_A_CORE
Pt seen and evaluated, chart reviewed. As per nursing report, no acute events overnight, no PRNs. On evaluation, pt presents pleasant and cooperative, with hickman affect, calm and well-related. He reports he is feeling better. He attributes improvement to decrease in anxious ruminations and improved sleep, states he is more easily able to fall and maintain sleep. He endorses improved energy and motivation, reports plan to attend more groups/DBT and work on coping skills. He denies AVH, paranoia. Denies SI/HI, intent and plan. 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 He is adherent to medications, denies negative side effects. Visible on unit and in behavioral control.  Spoke with pt's mother- updated on POC.  Pt seen and evaluated, chart reviewed. As per nursing report, no acute events overnight, no PRNs. On evaluation, pt presents pleasant and cooperative, with hickman affect, calm and well-related. He reports he is feeling better. He attributes improvement to decrease in anxious ruminations and improved sleep, states he is more easily able to fall and maintain sleep. He endorses improved energy and motivation, reports plan to attend more groups/DBT and work on coping skills. He denies AVH, paranoia. Denies SI/HI, intent and plan. 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 He is adherent to medications, denies negative side effects. Visible on unit and in behavioral control.  Spoke with pt's mother, Araceli- updated on POC, she denies safety concerns on discharge planning.

## 2024-02-26 NOTE — BH INPATIENT PSYCHIATRY DISCHARGE NOTE - ABUSE / TRAUMA HISTORY
Patient to ED c/o asthma / bronchitis. Self dx with bronchitis 2 weeks ago, was feeling better 1 week ago. Reports yesterday was bad with wheezing and coughing, albuterol inhaler isn't working per patient.   Last used her nebulizer at 630 this morning   s
Yes

## 2024-02-26 NOTE — BH INPATIENT PSYCHIATRY DISCHARGE NOTE - NSBHASSESSSUMMFT_PSY_ALL_CORE
29 y/o male, domiciled with mother, employed, single, no children, PMH of DM1, and PPH of ADHD and an unclear mood disorder, who was admitted to the ICU following an intentional overdose on his insulin in suicide attempt. He presented with ongoing symptoms of depression and anxiety, had not been taking care of himself and likely his health in the weeks leading up to the suicide attempt. Even if the overdose was impulsive, patient's premorbid functioning give the concern for worsening depression and anxiety. In addition, it is likely that his childhood experience of neglect has contributed to a maladaptive manner of dealing with relationships and concern for abandonment and a desire to be loved. He therefore seems to have significant difficulty coping with perceived rejection causing worsening depression and anxiety.     On evaluation, pt presents pleasant and well-related, with hickman affect than prior evaluations, well-groomed. He endorses feeling better which he attributes to decrease in anxious ruminations and improved sleep. He endorses continued resolution of SI/I/P, appears more future-oriented and endorses motivation to attend groups/DBT to work on coping skills. No overt psychosis noted. Pt initially started on zoloft, however during titration, pt reported new onset fine b/l hand tremors and daily nosebleeds. Discussed treatment options, pt agreed to crosstaper to lexapro; tolerating well. Pt has not been a behavioral concern, visible on unit and in groups/DBT. PHQ9 from 14 on admission to 7.    #MDD; r/o bipolar d/o  -c/w lexapro 10 mg daily   -c/w remeron 15 mg qhs for insomnia and augmentation  -c/w hydroxyzine 50 mg q6h prn for anxiety  -encourage groups/DBT    #DM1  -hospitalist consult  -endocrinology consult --> titrate lantus 12 units bid, c/w lispro 8 units tid with meals **reconsult on 2/23, titrated lantus 16 units bid  -c/w FS achs, consistent carbs diet    #Agitation  -for agitation not amenable to verbal redirection, may give haldol 3 mg q6h prn and ativan 1 mg q6h prn with escalation to IM if pt is refusing PO and is an acute danger to self or/and others with repeat EKG to ensure QTc <500 ms 29 y/o male, domiciled with mother, employed, single, no children, PMH of DM1, and PPH of ADHD and an unclear mood disorder, who was admitted to the ICU following an intentional overdose on his insulin in suicide attempt. He presented with ongoing symptoms of depression and anxiety, had not been taking care of himself and likely his health in the weeks leading up to the suicide attempt. Even if the overdose was impulsive, patient's premorbid functioning give the concern for worsening depression and anxiety. In addition, it is likely that his childhood experience of neglect has contributed to a maladaptive manner of dealing with relationships and concern for abandonment and a desire to be loved. He therefore seems to have significant difficulty coping with perceived rejection causing worsening depression and anxiety.     Pt initially started on zoloft, however during titration, pt reported new onset fine b/l hand tremors and daily nosebleeds. Discussed treatment options, pt agreed to crosstaper to lexapro; tolerating well with PHQ9 change from 14 on admission to 7. On evaluation, pt presents pleasant and well-related, with hickman affect, well-groomed, socializing appropriately with group milieu. He reports he is feeling better with improved mood and anxiety. He reports he is eating and sleeping well. 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 No overt psychosis noted. Pt has not been a behavioral concern, visible on unit and in groups/DBT. Anticipated discharge for tomorrow.     #MDD; r/o bipolar d/o  -c/w lexapro 10 mg daily   -c/w remeron 15 mg qhs for insomnia and augmentation  -c/w hydroxyzine 50 mg q6h prn for anxiety  -encourage groups/DBT    #DM1  -hospitalist consult  -endocrinology consult --> titrate lantus 12 units bid, c/w lispro 8 units tid with meals **2/23, titrated lantus 16 units bid **2/27 "Increase Lantus to 20 units q12hrs; no change in Lispro 8 units tid ac.  Can go home on this regimen and f/u with dr Neal as outpatient in Endo clinic" **on 2/28, reconsulted for fluctuating FS, c/w lantus 20 mg bid and decrease lispro 6 units tid with meals, f/u OP  -c/w FS achs, consistent carbs diet    #Agitation  -for agitation not amenable to verbal redirection, may give haldol 3 mg q6h prn and ativan 1 mg q6h prn with escalation to IM if pt is refusing PO and is an acute danger to self or/and others with repeat EKG to ensure QTc <500 ms

## 2024-02-26 NOTE — BH INPATIENT PSYCHIATRY DISCHARGE NOTE - NSDCMRMEDTOKEN_GEN_ALL_CORE_FT
HumaLOG KwikPen 100 units/mL injectable solution: 8 unit(s) injectable 3 times a day (before meals)  insulin glargine 100 units/mL subcutaneous solution: 22 unit(s) subcutaneous once a day (in the morning)  melatonin 3 mg oral tablet: 1 tab(s) orally once a day (at bedtime) as needed for Insomnia  Zoloft 50 mg oral tablet: 1 tab(s) orally once a day   alcohol swabs: Apply topically to affected area 4 times a day  escitalopram 10 mg oral tablet: 1 tab(s) orally once a day x 30 days Continue to take as prescribed until told otherwise by your providers  glucose tablets: Follow instructions on bottle when sugar is low.  insulin glargine 100 units/mL subcutaneous solution: 20 unit(s) subcutaneous 2 times a day Continue to take as prescribed until told otherwise by your providers  insulin lispro 100 units/mL injectable solution: 6 unit(s) subcutaneous 3 times a day (with meals) Continue to take as prescribed until told otherwise by your providers  lancets: 1 application subcutaneously 4 times a day  mirtazapine 15 mg oral tablet: 1 tab(s) orally once a day (at bedtime) x 30 days Continue to take as prescribed until told otherwise by your providers  test strips (per patient&#x27;s insurance): 1 application subcutaneously 4 times a day. ** Compatible with patient&#x27;s glucometer **

## 2024-02-26 NOTE — BH INPATIENT PSYCHIATRY DISCHARGE NOTE - NSBHMETABOLIC_PSY_ALL_CORE_FT
BMI: BMI (kg/m2): 21.1 (02-12-24 @ 14:55)  HbA1c: A1C with Estimated Average Glucose Result: 10.8 % (02-09-24 @ 04:23)    Glucose: POCT Blood Glucose.: 211 mg/dL (02-26-24 @ 11:22)    BP: 136/83 (02-26-24 @ 08:00) (117/78 - 137/80)Vital Signs Last 24 Hrs  T(C): 35.9 (02-26-24 @ 08:00), Max: 35.9 (02-26-24 @ 08:00)  T(F): 96.6 (02-26-24 @ 08:00), Max: 96.6 (02-26-24 @ 08:00)  HR: 106 (02-26-24 @ 08:00) (99 - 106)  BP: 136/83 (02-26-24 @ 08:00) (136/83 - 136/83)  BP(mean): --  RR: 16 (02-26-24 @ 08:00) (16 - 16)  SpO2: --      Lipid Panel: Date/Time: 02-08-24 @ 04:30  Cholesterol, Serum: 191  LDL Cholesterol Calculated: 91  HDL Cholesterol, Serum: 49  Total Cholesterol/HDL Ration Measurement: --  Triglycerides, Serum: 252

## 2024-02-26 NOTE — BH INPATIENT PSYCHIATRY PROGRESS NOTE - CURRENT MEDICATION
MEDICATIONS  (STANDING):  dextrose 5%. 1000 milliLiter(s) (100 mL/Hr) IV Continuous <Continuous>  dextrose 5%. 1000 milliLiter(s) (50 mL/Hr) IV Continuous <Continuous>  dextrose 50% Injectable 12.5 Gram(s) IV Push once  dextrose 50% Injectable 25 Gram(s) IV Push once  dextrose 50% Injectable 25 Gram(s) IV Push once  escitalopram 10 milliGRAM(s) Oral daily  glucagon  Injectable 1 milliGRAM(s) IntraMuscular once  insulin glargine Injectable (LANTUS) 16 Unit(s) SubCutaneous two times a day  insulin lispro (ADMELOG) corrective regimen sliding scale   SubCutaneous three times a day before meals  insulin lispro Injectable (ADMELOG) 8 Unit(s) SubCutaneous three times a day before meals  mirtazapine 15 milliGRAM(s) Oral at bedtime    MEDICATIONS  (PRN):  dextrose Oral Gel 15 Gram(s) Oral once PRN Blood Glucose LESS THAN 70 milliGRAM(s)/deciliter  haloperidol     Tablet 5 milliGRAM(s) Oral every 6 hours PRN agitation  hydrOXYzine hydrochloride 50 milliGRAM(s) Oral every 6 hours PRN anxiety   MEDICATIONS  (STANDING):  dextrose 5%. 1000 milliLiter(s) (50 mL/Hr) IV Continuous <Continuous>  dextrose 5%. 1000 milliLiter(s) (100 mL/Hr) IV Continuous <Continuous>  dextrose 50% Injectable 25 Gram(s) IV Push once  dextrose 50% Injectable 25 Gram(s) IV Push once  dextrose 50% Injectable 12.5 Gram(s) IV Push once  escitalopram 10 milliGRAM(s) Oral daily  glucagon  Injectable 1 milliGRAM(s) IntraMuscular once  insulin glargine Injectable (LANTUS) 16 Unit(s) SubCutaneous two times a day  insulin lispro (ADMELOG) corrective regimen sliding scale   SubCutaneous three times a day before meals  insulin lispro Injectable (ADMELOG) 8 Unit(s) SubCutaneous three times a day before meals  mirtazapine 15 milliGRAM(s) Oral at bedtime    MEDICATIONS  (PRN):  dextrose Oral Gel 15 Gram(s) Oral once PRN Blood Glucose LESS THAN 70 milliGRAM(s)/deciliter  haloperidol     Tablet 5 milliGRAM(s) Oral every 6 hours PRN agitation  hydrOXYzine hydrochloride 50 milliGRAM(s) Oral every 6 hours PRN anxiety

## 2024-02-27 LAB
GLUCOSE BLDC GLUCOMTR-MCNC: 216 MG/DL — HIGH (ref 70–99)
GLUCOSE BLDC GLUCOMTR-MCNC: 253 MG/DL — HIGH (ref 70–99)
GLUCOSE BLDC GLUCOMTR-MCNC: 309 MG/DL — HIGH (ref 70–99)
GLUCOSE BLDC GLUCOMTR-MCNC: 80 MG/DL — SIGNIFICANT CHANGE UP (ref 70–99)

## 2024-02-27 PROCEDURE — 99231 SBSQ HOSP IP/OBS SF/LOW 25: CPT

## 2024-02-27 RX ORDER — INSULIN GLARGINE 100 [IU]/ML
20 INJECTION, SOLUTION SUBCUTANEOUS
Refills: 0 | Status: DISCONTINUED | OUTPATIENT
Start: 2024-02-27 | End: 2024-02-29

## 2024-02-27 RX ADMIN — INSULIN GLARGINE 20 UNIT(S): 100 INJECTION, SOLUTION SUBCUTANEOUS at 20:03

## 2024-02-27 RX ADMIN — Medication 4: at 07:40

## 2024-02-27 RX ADMIN — INSULIN GLARGINE 16 UNIT(S): 100 INJECTION, SOLUTION SUBCUTANEOUS at 07:44

## 2024-02-27 RX ADMIN — Medication 8 UNIT(S): at 11:41

## 2024-02-27 RX ADMIN — MIRTAZAPINE 15 MILLIGRAM(S): 45 TABLET, ORALLY DISINTEGRATING ORAL at 20:03

## 2024-02-27 RX ADMIN — Medication 8 UNIT(S): at 07:37

## 2024-02-27 RX ADMIN — Medication 8 UNIT(S): at 16:37

## 2024-02-27 RX ADMIN — ESCITALOPRAM OXALATE 10 MILLIGRAM(S): 10 TABLET, FILM COATED ORAL at 08:24

## 2024-02-27 RX ADMIN — Medication 3: at 16:38

## 2024-02-27 NOTE — BH INPATIENT PSYCHIATRY PROGRESS NOTE - NSBHASSESSSUMMFT_PSY_ALL_CORE
29 y/o male, domiciled with mother, employed, single, no children, PMH of DM1, and PPH of ADHD and an unclear mood disorder, who was admitted to the ICU following an intentional overdose on his insulin in suicide attempt. He presented with ongoing symptoms of depression and anxiety, had not been taking care of himself and likely his health in the weeks leading up to the suicide attempt. Even if the overdose was impulsive, patient's premorbid functioning give the concern for worsening depression and anxiety. In addition, it is likely that his childhood experience of neglect has contributed to a maladaptive manner of dealing with relationships and concern for abandonment and a desire to be loved. He therefore seems to have significant difficulty coping with perceived rejection causing worsening depression and anxiety.     Pt initially started on zoloft, however during titration, pt reported new onset fine b/l hand tremors and daily nosebleeds. Discussed treatment options, pt agreed to crosstaper to lexapro; tolerating well with PHQ9 change from 14 on admission to 7. On evaluation, pt presents pleasant and well-related, with hickman affect, well-groomed, socializing appropriately with group milieu. He reports he is feeling better with improved mood and anxiety. He reports he is eating and sleeping well. 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 No overt psychosis noted. Pt has not been a behavioral concern, visible on unit and in groups/DBT.     #MDD; r/o bipolar d/o  -c/w lexapro 10 mg daily   -c/w remeron 15 mg qhs for insomnia and augmentation  -c/w hydroxyzine 50 mg q6h prn for anxiety  -encourage groups/DBT    #DM1  -hospitalist consult  -endocrinology consult --> titrate lantus 12 units bid, c/w lispro 8 units tid with meals **reconsult on 2/23, titrated lantus 16 units bid  -c/w FS achs, consistent carbs diet    #Agitation  -for agitation not amenable to verbal redirection, may give haldol 3 mg q6h prn and ativan 1 mg q6h prn with escalation to IM if pt is refusing PO and is an acute danger to self or/and others with repeat EKG to ensure QTc <500 ms 29 y/o male, domiciled with mother, employed, single, no children, PMH of DM1, and PPH of ADHD and an unclear mood disorder, who was admitted to the ICU following an intentional overdose on his insulin in suicide attempt. He presented with ongoing symptoms of depression and anxiety, had not been taking care of himself and likely his health in the weeks leading up to the suicide attempt. Even if the overdose was impulsive, patient's premorbid functioning give the concern for worsening depression and anxiety. In addition, it is likely that his childhood experience of neglect has contributed to a maladaptive manner of dealing with relationships and concern for abandonment and a desire to be loved. He therefore seems to have significant difficulty coping with perceived rejection causing worsening depression and anxiety.     Pt initially started on zoloft, however during titration, pt reported new onset fine b/l hand tremors and daily nosebleeds. Discussed treatment options, pt agreed to crosstaper to lexapro; tolerating well with PHQ9 change from 14 on admission to 7. On evaluation, pt presents pleasant and well-related, with hickman affect, well-groomed, socializing appropriately with group milieu. He reports he is feeling better with improved mood and anxiety. He reports he is eating and sleeping well. 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 No overt psychosis noted. Pt has not been a behavioral concern, visible on unit and in groups/DBT.     #MDD; r/o bipolar d/o  -c/w lexapro 10 mg daily   -c/w remeron 15 mg qhs for insomnia and augmentation  -c/w hydroxyzine 50 mg q6h prn for anxiety  -encourage groups/DBT    #DM1  -hospitalist consult  -endocrinology consult --> titrate lantus 12 units bid, c/w lispro 8 units tid with meals **reconsult on 2/23, titrated lantus 16 units bid **reconsult on 2/27 "Increase Lantus to 20 units q12hrs; no change in Lispro 8 units tid ac.  Can go home on this regimen and f/u with dr Neal as outpatient in Endo clinic"  -c/w FS achs, consistent carbs diet    #Agitation  -for agitation not amenable to verbal redirection, may give haldol 3 mg q6h prn and ativan 1 mg q6h prn with escalation to IM if pt is refusing PO and is an acute danger to self or/and others with repeat EKG to ensure QTc <500 ms

## 2024-02-27 NOTE — BH TREATMENT PLAN - NSTXANXINTERPR_PSY_ALL_CORE
Rt will continue to offer support and encouragement .
Rt will offer support and encouragement .
cxr: no i/e, + port

## 2024-02-27 NOTE — PROGRESS NOTE ADULT - ASSESSMENT
Type 1 diabetes - on lantus 22units and can probably be increased to 24 units as his home dose was 300   cont coverage and pre meal insulin   Adjust his diet to decrease snacks  please let medicine know if he still has FS higher than 200  
poorly controlled type 1 diabetes, try increasing glargine to 16 units twice daily, continue current lispro dosage, does need outpatient follow up in clinic extn. [phone]
Increase Lantus to 20 units q12hrs; no change in Lispro 8 units tid ac.  Can go home on this regimen and f/u with dr Neal as outpatient in Endo clinic

## 2024-02-27 NOTE — PROGRESS NOTE ADULT - SUBJECTIVE AND OBJECTIVE BOX
FBG > 200-300
27 yo male with ADHD presented to ICU after insulin overdose due to suicide attempt   patient seen and examined   feels fine with no complaints   FS still go up to 200s but overall improved   His last a1c was 10.2 february   vss  labs reviewed   On exam   AAOX3  CVS S1S2 RRR  LUNG CTA   ABD NT ND  EXT NO EDEMA      
Reason for Endocrinology Consult: Diabetes    HPI: 28y Male      PAST MEDICAL & SURGICAL HISTORY:  Type 1 diabetes mellitus  Diagnosed in 2005, managed by Dr. Johnston      Hypercholesterolemia  currently on Lipitor        FAMILY HISTORY:      SH:  Smoking  Etoh:  Recreational Drugs:    Home Medications:  insulin glargine 100 units/mL subcutaneous solution: 22 unit(s) subcutaneous once a day (in the morning) (10 Feb 2024 12:56)  Zoloft 50 mg oral tablet: 1 tab(s) orally once a day (10 Feb 2024 13:11)      Current (Non-Endocrine) Meds:  dextrose 5%. 1000 milliLiter(s) IV Continuous <Continuous>  dextrose 5%. 1000 milliLiter(s) IV Continuous <Continuous>  escitalopram 10 milliGRAM(s) Oral daily  haloperidol     Tablet 5 milliGRAM(s) Oral every 6 hours PRN  hydrOXYzine hydrochloride 50 milliGRAM(s) Oral every 6 hours PRN  mirtazapine 15 milliGRAM(s) Oral at bedtime      Current Endocrine Meds:   dextrose 50% Injectable 12.5 Gram(s) IV Push once  dextrose 50% Injectable 25 Gram(s) IV Push once  dextrose 50% Injectable 25 Gram(s) IV Push once  dextrose Oral Gel 15 Gram(s) Oral once PRN  glucagon  Injectable 1 milliGRAM(s) IntraMuscular once  insulin glargine Injectable (LANTUS) 12 Unit(s) SubCutaneous two times a day  insulin lispro (ADMELOG) corrective regimen sliding scale   SubCutaneous three times a day before meals  insulin lispro Injectable (ADMELOG) 8 Unit(s) SubCutaneous three times a day before meals      Allergies:  No Known Allergies      ROS:  Denies the following except as indicated.    General: weight loss/weight gain, decreased appetite, fatigue, fever  Eyes: blurry vision, double vision  ENT: neck swelling, dysphagia, voice changes   CV: palpitations, SOB, chest pain, cough  GI: nausea, vomiting, diarrhea, constipation, abdominal pain  : nocturia,  polyuria, dysuria  Endo: decreased libido, heat/cold intolerance, jitteriness  MSK: arthralgias, myalgias  Skin: rash, dryness, diaphoresis  Neuro: pedal numbness,pedal paresthesias, pedal pain        Vital Signs Last 24 Hrs  T(C): 35.9 (23 Feb 2024 15:27), Max: 35.9 (23 Feb 2024 15:27)  T(F): 96.6 (23 Feb 2024 15:27), Max: 96.6 (23 Feb 2024 15:27)  HR: 104 (23 Feb 2024 15:27) (79 - 104)  BP: 137/80 (23 Feb 2024 15:27) (129/81 - 137/80)  BP(mean): --  RR: 18 (23 Feb 2024 15:27) (16 - 18)  SpO2: --      Constitutional: WN/WD in NAD.   Neck: no thyromegaly or palpable thyroid nodules   Respiratory: lungs CTAB.  Cardiovascular: regular rate and rhythm, normal S1 and S2, no audible murmurs  GI: soft, NT/ND, no masses/HSM appreciated.  Ext: no edema, no ulcers, pedal pulses palpable bilaterally  Neurology: no tremor, monofilament sensation intact in feet  Psychiatric: A&O x 3, normal affect/mood.        LABS:                                     Thyroid Stimulating Hormone, Serum: 1.03 (02-13 @ 09:26)          RADIOLOGY & ADDITIONAL STUDIES:    A/P:28yMale

## 2024-02-27 NOTE — BH TREATMENT PLAN - NSTXANXGOAL_PSY_ALL_CORE
Be able to perform ADLs and maintain safety despite anxiety/panic daily
Identify and practice 3 coping skills to manage anxiety
Identify and practice 3 coping skills to manage anxiety

## 2024-02-27 NOTE — BH TREATMENT PLAN - NSTXDIABGOAL_PSY_ALL_CORE
Will identify modifiable risk factors and strategies to counteract them
Will identify modifiable risk factors and strategies to counteract them
Will verbalize 2 signs and symptoms of hypo and hyperglycemia

## 2024-02-27 NOTE — BH TREATMENT PLAN - NSCMSPTSTRENGTHS_PSY_ALL_CORE
Compliance to treatment/Expressive of emotions/Highly motivated for treatment/Motivated/Supportive family
Assertive/Compliance to treatment/Expressive of emotions/Future/goal oriented/Highly motivated for treatment/Motivated/Self confidence/Supportive family
Assertive/Compliance to treatment/Expressive of emotions/Future/goal oriented/Motivated/Supportive family

## 2024-02-27 NOTE — BH TREATMENT PLAN - ANXIETY/PANIC/FEAR NURSING INTERVENTIONS/RECOMMENDATIONS
RN to encourage medication compliance and provide support and education as needed on Dx, coping skills, medication, and safety planning  RN to observe for increasing anxiety and maintain a calm environment.  RN to monitor patient for s/s of anxiety.  RN will assess and intervene for any disruptive behaviors  RN to encourage daily ADL's  RN to encourage group attendance  RN will assess and intervene for any disruptive behaviors

## 2024-02-27 NOTE — BH TREATMENT PLAN - NSBHPRIMARYDX_PSY_ALL_CORE
MDD (major depressive disorder), recurrent episode    

## 2024-02-27 NOTE — BH INPATIENT PSYCHIATRY PROGRESS NOTE - NSBHFUPINTERVALHXFT_PSY_A_CORE
Pt seen and evaluated, chart reviewed. As per nursing report, no acute events overnight, no PRNs. On evaluation, pt presents pleasant and cooperative, calm and well-related, socializing appropriately with group milieu. He reports he is feeling better and is ready for discharge tomorrow. He endorses improved mood with decrease in anxious ruminations. He endorses good sleep and appetite. He denies AVH, paranoia. Denies passive and active SI/HI, intent and plan. 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 He is adherent to medications, denies negative side effects. Visible on unit and in behavioral control. Discharge planning started.

## 2024-02-27 NOTE — BH INPATIENT PSYCHIATRY PROGRESS NOTE - NSBHMETABOLIC_PSY_ALL_CORE_FT
BMI: BMI (kg/m2): 21.1 (02-12-24 @ 14:55)  HbA1c: A1C with Estimated Average Glucose Result: 10.8 % (02-09-24 @ 04:23)    Glucose: POCT Blood Glucose.: 80 mg/dL (02-27-24 @ 11:15)    BP: 133/90 (02-27-24 @ 08:31) (117/78 - 138/93)Vital Signs Last 24 Hrs  T(C): 35.8 (02-27-24 @ 08:31), Max: 36.1 (02-26-24 @ 16:00)  T(F): 96.5 (02-27-24 @ 08:31), Max: 96.9 (02-26-24 @ 16:00)  HR: 92 (02-27-24 @ 08:31) (92 - 111)  BP: 133/90 (02-27-24 @ 08:31) (133/90 - 138/93)  BP(mean): --  RR: 16 (02-27-24 @ 08:31) (16 - 18)  SpO2: --      Lipid Panel: Date/Time: 02-08-24 @ 04:30  Cholesterol, Serum: 191  LDL Cholesterol Calculated: 91  HDL Cholesterol, Serum: 49  Total Cholesterol/HDL Ration Measurement: --  Triglycerides, Serum: 252

## 2024-02-27 NOTE — BH INPATIENT PSYCHIATRY PROGRESS NOTE - NSBHCHARTREVIEWVS_PSY_A_CORE FT
Vital Signs Last 24 Hrs  T(C): 35.8 (02-27-24 @ 08:31), Max: 36.1 (02-26-24 @ 16:00)  T(F): 96.5 (02-27-24 @ 08:31), Max: 96.9 (02-26-24 @ 16:00)  HR: 92 (02-27-24 @ 08:31) (92 - 111)  BP: 133/90 (02-27-24 @ 08:31) (133/90 - 138/93)  BP(mean): --  RR: 16 (02-27-24 @ 08:31) (16 - 18)  SpO2: --

## 2024-02-27 NOTE — BH TREATMENT PLAN - NSTXDIABINTERRN_PSY_ALL_CORE
monitor blood glucose and provide education regarding proper diet and excercise as well as monitoring of blood sugar levels with signs and symptoms of hyper/hypo glycemia
Monitor blood glucose levels  Administer medication as ordered  Diabetes education to be provided
Monitor blood glucose levels  Administer medication as ordered  Diabetes education to be provided

## 2024-02-27 NOTE — BH TREATMENT PLAN - NSTXDIABINTERMD_PSY_ALL_CORE
Psychoeducation, RD consult, hospitalist consult

## 2024-02-27 NOTE — BH TREATMENT PLAN - NSTXSUICIDGOAL_PSY_ALL_CORE
Be able to state 3 reasons for living
Be able to state 3 reasons for living
Will verbalize a decrease in preoccupation with suicidal thoughts and / or intent to commit suicide to 2 on a 10-point scale

## 2024-02-27 NOTE — BH TREATMENT PLAN - NSTXSUICIDINTERMD_PSY_ALL_CORE
Medications management, encourage groups/DBT, suicide precautions, routine checks

## 2024-02-27 NOTE — BH TREATMENT PLAN - NSTXDEPRESGOAL_PSY_ALL_CORE
Will identify 2 coping skills that assist in improving mood
Report using a coping skill to overcome sadness and worry in order to socialize with peers daily
Will identify 2 coping skills that assist in improving mood

## 2024-02-27 NOTE — BH TREATMENT PLAN - NSTXDEPRESINTERSW_PSY_ALL_CORE
SW will meet with patient daily to provide support, education, collateral and referrals. Patient will be encouraged to attend groups while on the unit to gain coping skills for depressed mood and anxiety.

## 2024-02-27 NOTE — BH TREATMENT PLAN - NSTXSUICIDINTERRN_PSY_ALL_CORE
RN to assess patients behavior and be alert for increased signs of impulsivity/agitation.  RN will monitor patient and provide support and comfort and provedie safety on unit.    RN to encourage medication compliance and provide support and education as needed on Dx, coping skills, medication, and safety planning.  RN to Encourage daily ADL's  RN to Encourage group attendance  RN will assess and intervene for any suicidal ideations
RN to assess patients behavior and be alert for increased signs of impulsivity/agitation.  RN will monitor patient and provide support and comfort and provedie safety on unit.    RN to encourage medication compliance and provide support and education as needed on Dx, coping skills, medication, and safety planning.  RN to Encourage daily ADL's  RN to Encourage group attendance  RN will assess and intervene for any suicidal ideations
RN to assess patients behavior and be alert for increased signs of worsening mood, impulsivity and agitation. RN will monitor patient and provide support and comfort and provedie safety on unit. RN to encourage medication compliance. Provide support and education as needed on Dx, coping skills, medication, and safety planning. RN to Encourage daily ADL's. RN to Encourage group attendance  RN will assess and intervene for any suicidal ideations. Safety planning

## 2024-02-27 NOTE — BH INPATIENT PSYCHIATRY PROGRESS NOTE - CURRENT MEDICATION
MEDICATIONS  (STANDING):  dextrose 5%. 1000 milliLiter(s) (50 mL/Hr) IV Continuous <Continuous>  dextrose 5%. 1000 milliLiter(s) (100 mL/Hr) IV Continuous <Continuous>  dextrose 50% Injectable 12.5 Gram(s) IV Push once  dextrose 50% Injectable 25 Gram(s) IV Push once  dextrose 50% Injectable 25 Gram(s) IV Push once  escitalopram 10 milliGRAM(s) Oral daily  glucagon  Injectable 1 milliGRAM(s) IntraMuscular once  insulin glargine Injectable (LANTUS) 16 Unit(s) SubCutaneous two times a day  insulin lispro (ADMELOG) corrective regimen sliding scale   SubCutaneous three times a day before meals  insulin lispro Injectable (ADMELOG) 8 Unit(s) SubCutaneous three times a day before meals  mirtazapine 15 milliGRAM(s) Oral at bedtime    MEDICATIONS  (PRN):  dextrose Oral Gel 15 Gram(s) Oral once PRN Blood Glucose LESS THAN 70 milliGRAM(s)/deciliter  haloperidol     Tablet 5 milliGRAM(s) Oral every 6 hours PRN agitation  hydrOXYzine hydrochloride 50 milliGRAM(s) Oral every 6 hours PRN anxiety   MEDICATIONS  (STANDING):  dextrose 5%. 1000 milliLiter(s) (50 mL/Hr) IV Continuous <Continuous>  dextrose 5%. 1000 milliLiter(s) (100 mL/Hr) IV Continuous <Continuous>  dextrose 50% Injectable 12.5 Gram(s) IV Push once  dextrose 50% Injectable 25 Gram(s) IV Push once  dextrose 50% Injectable 25 Gram(s) IV Push once  escitalopram 10 milliGRAM(s) Oral daily  glucagon  Injectable 1 milliGRAM(s) IntraMuscular once  insulin glargine Injectable (LANTUS) 20 Unit(s) SubCutaneous two times a day  insulin lispro (ADMELOG) corrective regimen sliding scale   SubCutaneous three times a day before meals  insulin lispro Injectable (ADMELOG) 8 Unit(s) SubCutaneous three times a day before meals  mirtazapine 15 milliGRAM(s) Oral at bedtime    MEDICATIONS  (PRN):  dextrose Oral Gel 15 Gram(s) Oral once PRN Blood Glucose LESS THAN 70 milliGRAM(s)/deciliter  haloperidol     Tablet 5 milliGRAM(s) Oral every 6 hours PRN agitation  hydrOXYzine hydrochloride 50 milliGRAM(s) Oral every 6 hours PRN anxiety

## 2024-02-27 NOTE — BH TREATMENT PLAN - NSTXDEPRESINTERMD_PSY_ALL_CORE
Medications management, encourage groups/DBT

## 2024-02-28 LAB
GLUCOSE BLDC GLUCOMTR-MCNC: 245 MG/DL — HIGH (ref 70–99)
GLUCOSE BLDC GLUCOMTR-MCNC: 261 MG/DL — HIGH (ref 70–99)
GLUCOSE BLDC GLUCOMTR-MCNC: 290 MG/DL — HIGH (ref 70–99)
GLUCOSE BLDC GLUCOMTR-MCNC: 89 MG/DL — SIGNIFICANT CHANGE UP (ref 70–99)

## 2024-02-28 PROCEDURE — 99231 SBSQ HOSP IP/OBS SF/LOW 25: CPT

## 2024-02-28 RX ORDER — ESCITALOPRAM OXALATE 10 MG/1
1 TABLET, FILM COATED ORAL
Qty: 30 | Refills: 0
Start: 2024-02-28 | End: 2024-03-28

## 2024-02-28 RX ORDER — SERTRALINE 25 MG/1
1 TABLET, FILM COATED ORAL
Qty: 0 | Refills: 0 | DISCHARGE

## 2024-02-28 RX ORDER — MIRTAZAPINE 45 MG/1
1 TABLET, ORALLY DISINTEGRATING ORAL
Qty: 30 | Refills: 0
Start: 2024-02-28 | End: 2024-03-28

## 2024-02-28 RX ORDER — INSULIN GLARGINE 100 [IU]/ML
20 INJECTION, SOLUTION SUBCUTANEOUS
Qty: 0 | Refills: 0 | DISCHARGE
Start: 2024-02-28

## 2024-02-28 RX ORDER — INSULIN LISPRO 100/ML
6 VIAL (ML) SUBCUTANEOUS
Refills: 0 | Status: DISCONTINUED | OUTPATIENT
Start: 2024-02-28 | End: 2024-02-29

## 2024-02-28 RX ORDER — INSULIN LISPRO 100/ML
6 VIAL (ML) SUBCUTANEOUS
Qty: 0 | Refills: 0 | DISCHARGE
Start: 2024-02-28

## 2024-02-28 RX ORDER — ISOPROPYL ALCOHOL, BENZOCAINE .7; .06 ML/ML; ML/ML
0 SWAB TOPICAL
Qty: 100 | Refills: 1
Start: 2024-02-28

## 2024-02-28 RX ADMIN — INSULIN GLARGINE 20 UNIT(S): 100 INJECTION, SOLUTION SUBCUTANEOUS at 08:16

## 2024-02-28 RX ADMIN — MIRTAZAPINE 15 MILLIGRAM(S): 45 TABLET, ORALLY DISINTEGRATING ORAL at 20:20

## 2024-02-28 RX ADMIN — Medication 6 UNIT(S): at 16:31

## 2024-02-28 RX ADMIN — Medication 8 UNIT(S): at 11:45

## 2024-02-28 RX ADMIN — Medication 8 UNIT(S): at 07:46

## 2024-02-28 RX ADMIN — INSULIN GLARGINE 20 UNIT(S): 100 INJECTION, SOLUTION SUBCUTANEOUS at 20:20

## 2024-02-28 RX ADMIN — ESCITALOPRAM OXALATE 10 MILLIGRAM(S): 10 TABLET, FILM COATED ORAL at 08:52

## 2024-02-28 RX ADMIN — Medication 2: at 16:30

## 2024-02-28 RX ADMIN — Medication 3: at 07:57

## 2024-02-28 NOTE — BH INPATIENT PSYCHIATRY PROGRESS NOTE - NSBHFUPINTERVALHXFT_PSY_A_CORE
Pt seen and evaluated, chart reviewed. As per nursing report, no acute events overnight, no PRNs. On evaluation, pt presents linear, calm and well-related, socializing appropriately with group milieu. He reports he is feeling better and is ready for discharge tomorrow. He reports improved mood with overall decrease in anxious ruminations. He endorses good sleep and appetite. He denies AVH, paranoia. Denies passive and active SI/HI, intent and plan. 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 He is adherent to medications, denies negative side effects. Visible on unit and in behavioral control. Anticipated discharge for tomorrow.  Pt seen and evaluated, chart reviewed. Pt noted with fluctuating FS, reconsulted with endocrinologist and insulin adjusted as per recs for discharge. As per nursing report, no acute events overnight, no PRNs. On evaluation, pt presents linear, calm and well-related, socializing appropriately with group milieu. He reports he is feeling better and is ready for discharge tomorrow. He reports improved mood with overall decrease in anxious ruminations. He endorses good sleep and appetite. He denies AVH, paranoia. Denies passive and active SI/HI, intent and plan. 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 He is adherent to medications, denies negative side effects. Visible on unit and in behavioral control. Anticipated discharge for tomorrow.

## 2024-02-28 NOTE — BH INPATIENT PSYCHIATRY PROGRESS NOTE - CURRENT MEDICATION
MEDICATIONS  (STANDING):  dextrose 5%. 1000 milliLiter(s) (50 mL/Hr) IV Continuous <Continuous>  dextrose 5%. 1000 milliLiter(s) (100 mL/Hr) IV Continuous <Continuous>  dextrose 50% Injectable 12.5 Gram(s) IV Push once  dextrose 50% Injectable 25 Gram(s) IV Push once  dextrose 50% Injectable 25 Gram(s) IV Push once  escitalopram 10 milliGRAM(s) Oral daily  glucagon  Injectable 1 milliGRAM(s) IntraMuscular once  insulin glargine Injectable (LANTUS) 20 Unit(s) SubCutaneous two times a day  insulin lispro (ADMELOG) corrective regimen sliding scale   SubCutaneous three times a day before meals  insulin lispro Injectable (ADMELOG) 8 Unit(s) SubCutaneous three times a day before meals  mirtazapine 15 milliGRAM(s) Oral at bedtime    MEDICATIONS  (PRN):  dextrose Oral Gel 15 Gram(s) Oral once PRN Blood Glucose LESS THAN 70 milliGRAM(s)/deciliter  haloperidol     Tablet 5 milliGRAM(s) Oral every 6 hours PRN agitation  hydrOXYzine hydrochloride 50 milliGRAM(s) Oral every 6 hours PRN anxiety   MEDICATIONS  (STANDING):  dextrose 5%. 1000 milliLiter(s) (50 mL/Hr) IV Continuous <Continuous>  dextrose 5%. 1000 milliLiter(s) (100 mL/Hr) IV Continuous <Continuous>  dextrose 50% Injectable 12.5 Gram(s) IV Push once  dextrose 50% Injectable 25 Gram(s) IV Push once  dextrose 50% Injectable 25 Gram(s) IV Push once  escitalopram 10 milliGRAM(s) Oral daily  glucagon  Injectable 1 milliGRAM(s) IntraMuscular once  insulin glargine Injectable (LANTUS) 20 Unit(s) SubCutaneous two times a day  insulin lispro (ADMELOG) corrective regimen sliding scale   SubCutaneous three times a day before meals  insulin lispro Injectable (ADMELOG) 6 Unit(s) SubCutaneous three times a day before meals  mirtazapine 15 milliGRAM(s) Oral at bedtime    MEDICATIONS  (PRN):  dextrose Oral Gel 15 Gram(s) Oral once PRN Blood Glucose LESS THAN 70 milliGRAM(s)/deciliter  haloperidol     Tablet 5 milliGRAM(s) Oral every 6 hours PRN agitation  hydrOXYzine hydrochloride 50 milliGRAM(s) Oral every 6 hours PRN anxiety

## 2024-02-28 NOTE — BH INPATIENT PSYCHIATRY PROGRESS NOTE - NSBHCHARTREVIEWVS_PSY_A_CORE FT
Vital Signs Last 24 Hrs  T(C): 35.7 (02-28-24 @ 09:16), Max: 35.8 (02-27-24 @ 15:00)  T(F): 96.3 (02-28-24 @ 09:16), Max: 96.4 (02-27-24 @ 15:00)  HR: 99 (02-28-24 @ 09:16) (99 - 99)  BP: 126/89 (02-28-24 @ 09:16) (126/89 - 129/80)  BP(mean): --  RR: 18 (02-28-24 @ 09:16) (16 - 18)  SpO2: --     Vital Signs Last 24 Hrs  T(C): 35.7 (02-28-24 @ 09:16), Max: 35.7 (02-28-24 @ 09:16)  T(F): 96.3 (02-28-24 @ 09:16), Max: 96.3 (02-28-24 @ 09:16)  HR: 99 (02-28-24 @ 09:16) (99 - 99)  BP: 126/89 (02-28-24 @ 09:16) (126/89 - 126/89)  BP(mean): --  RR: 18 (02-28-24 @ 09:16) (18 - 18)  SpO2: --
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
## 2024-02-28 NOTE — BH INPATIENT PSYCHIATRY PROGRESS NOTE - NSBHMETABOLIC_PSY_ALL_CORE_FT
BMI: BMI (kg/m2): 21.1 (02-12-24 @ 14:55)  HbA1c: A1C with Estimated Average Glucose Result: 10.8 % (02-09-24 @ 04:23)    Glucose: POCT Blood Glucose.: 89 mg/dL (02-28-24 @ 10:56)    BP: 126/89 (02-28-24 @ 09:16) (126/89 - 138/93)Vital Signs Last 24 Hrs  T(C): 35.7 (02-28-24 @ 09:16), Max: 35.8 (02-27-24 @ 15:00)  T(F): 96.3 (02-28-24 @ 09:16), Max: 96.4 (02-27-24 @ 15:00)  HR: 99 (02-28-24 @ 09:16) (99 - 99)  BP: 126/89 (02-28-24 @ 09:16) (126/89 - 129/80)  BP(mean): --  RR: 18 (02-28-24 @ 09:16) (16 - 18)  SpO2: --      Lipid Panel: Date/Time: 02-08-24 @ 04:30  Cholesterol, Serum: 191  LDL Cholesterol Calculated: 91  HDL Cholesterol, Serum: 49  Total Cholesterol/HDL Ration Measurement: --  Triglycerides, Serum: 252   BMI: BMI (kg/m2): 21.1 (02-12-24 @ 14:55)  HbA1c: A1C with Estimated Average Glucose Result: 10.8 % (02-09-24 @ 04:23)    Glucose: POCT Blood Glucose.: 245 mg/dL (02-28-24 @ 15:49)    BP: 126/89 (02-28-24 @ 09:16) (126/89 - 138/93)Vital Signs Last 24 Hrs  T(C): 35.7 (02-28-24 @ 09:16), Max: 35.7 (02-28-24 @ 09:16)  T(F): 96.3 (02-28-24 @ 09:16), Max: 96.3 (02-28-24 @ 09:16)  HR: 99 (02-28-24 @ 09:16) (99 - 99)  BP: 126/89 (02-28-24 @ 09:16) (126/89 - 126/89)  BP(mean): --  RR: 18 (02-28-24 @ 09:16) (18 - 18)  SpO2: --      Lipid Panel: Date/Time: 02-08-24 @ 04:30  Cholesterol, Serum: 191  LDL Cholesterol Calculated: 91  HDL Cholesterol, Serum: 49  Total Cholesterol/HDL Ration Measurement: --  Triglycerides, Serum: 252

## 2024-02-29 VITALS
TEMPERATURE: 96 F | SYSTOLIC BLOOD PRESSURE: 139 MMHG | DIASTOLIC BLOOD PRESSURE: 89 MMHG | RESPIRATION RATE: 18 BRPM | HEART RATE: 98 BPM

## 2024-02-29 LAB
GLUCOSE BLDC GLUCOMTR-MCNC: 139 MG/DL — HIGH (ref 70–99)
GLUCOSE BLDC GLUCOMTR-MCNC: 340 MG/DL — HIGH (ref 70–99)

## 2024-02-29 PROCEDURE — 99238 HOSP IP/OBS DSCHRG MGMT 30/<: CPT

## 2024-02-29 PROCEDURE — 99232 SBSQ HOSP IP/OBS MODERATE 35: CPT

## 2024-02-29 RX ADMIN — Medication 4: at 08:10

## 2024-02-29 RX ADMIN — INSULIN GLARGINE 20 UNIT(S): 100 INJECTION, SOLUTION SUBCUTANEOUS at 08:17

## 2024-02-29 RX ADMIN — ESCITALOPRAM OXALATE 10 MILLIGRAM(S): 10 TABLET, FILM COATED ORAL at 08:13

## 2024-02-29 RX ADMIN — Medication 6 UNIT(S): at 11:08

## 2024-02-29 RX ADMIN — Medication 6 UNIT(S): at 08:11

## 2024-02-29 NOTE — BH INPATIENT PSYCHIATRY PROGRESS NOTE - NSTXDEPRESGOAL_PSY_ALL_CORE
Will identify 2 coping skills that assist in improving mood
Report using a coping skill to overcome sadness and worry in order to socialize with peers daily
Will identify 2 coping skills that assist in improving mood
Report using a coping skill to overcome sadness and worry in order to socialize with peers daily
Will identify 2 coping skills that assist in improving mood
Report using a coping skill to overcome sadness and worry in order to socialize with peers daily
Will identify 2 coping skills that assist in improving mood

## 2024-02-29 NOTE — BH INPATIENT PSYCHIATRY PROGRESS NOTE - NSBHMETABOLIC_PSY_ALL_CORE_FT
BMI: BMI (kg/m2): 21.1 (02-12-24 @ 14:55)  HbA1c: A1C with Estimated Average Glucose Result: 10.8 % (02-09-24 @ 04:23)    Glucose: POCT Blood Glucose.: 139 mg/dL (02-29-24 @ 11:03)    BP: 139/89 (02-29-24 @ 10:49) (126/89 - 142/90)Vital Signs Last 24 Hrs  T(C): 35.8 (02-29-24 @ 10:49), Max: 36.2 (02-28-24 @ 16:10)  T(F): 96.5 (02-29-24 @ 10:49), Max: 97.2 (02-28-24 @ 16:10)  HR: 98 (02-29-24 @ 10:49) (92 - 98)  BP: 139/89 (02-29-24 @ 10:49) (139/89 - 142/90)  BP(mean): --  RR: 18 (02-29-24 @ 10:49) (16 - 18)  SpO2: --      Lipid Panel: Date/Time: 02-08-24 @ 04:30  Cholesterol, Serum: 191  LDL Cholesterol Calculated: 91  HDL Cholesterol, Serum: 49  Total Cholesterol/HDL Ration Measurement: --  Triglycerides, Serum: 252

## 2024-02-29 NOTE — BH INPATIENT PSYCHIATRY PROGRESS NOTE - NSTXPROBANX_PSY_ALL_CORE
ANXIETY/PANIC/FEAR

## 2024-02-29 NOTE — BH INPATIENT PSYCHIATRY PROGRESS NOTE - NSICDXBHPRIMARYDX_PSY_ALL_CORE
MDD (major depressive disorder), recurrent episode   F33.9  

## 2024-02-29 NOTE — BH INPATIENT PSYCHIATRY PROGRESS NOTE - NSBHFUPINTERVALHXFT_PSY_A_CORE
Pt seen and evaluated, chart reviewed. As per nursing report, no acute events overnight, no PRNs. On evaluation, pt presents linear, calm and well-related, socializing appropriately with group milieu. He reports he is feeling better and is ready for discharge today. He reports fair mood, sleep and appetite. He reports decrease in anxious ruminations and able to verbalize effective coping skills. He denies AVH, paranoia. Denies passive and active SI/HI, intent and plan. 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 He is adherent to medications, denies negative side effects. Visible on unit and in groups/DBT. Has not been a behavioral concern. Discharge today. Pt verbalizes understanding and agrees to discharge instructions.

## 2024-02-29 NOTE — BH INPATIENT PSYCHIATRY PROGRESS NOTE - NSTXANXGOAL_PSY_ALL_CORE
Be able to perform ADLs and maintain safety despite anxiety/panic daily
Be able to perform ADLs and maintain safety despite anxiety/panic daily
Identify and practice 3 coping skills to manage anxiety
Identify and practice 3 coping skills to manage anxiety
Be able to perform ADLs and maintain safety despite anxiety/panic daily
Identify and practice 3 coping skills to manage anxiety
Be able to perform ADLs and maintain safety despite anxiety/panic daily
Be able to participate in activities despite lingering anxiety/panic
Identify and practice 3 coping skills to manage anxiety

## 2024-02-29 NOTE — BH INPATIENT PSYCHIATRY PROGRESS NOTE - CURRENT MEDICATION
MEDICATIONS  (STANDING):  dextrose 5%. 1000 milliLiter(s) (100 mL/Hr) IV Continuous <Continuous>  dextrose 5%. 1000 milliLiter(s) (50 mL/Hr) IV Continuous <Continuous>  dextrose 50% Injectable 12.5 Gram(s) IV Push once  dextrose 50% Injectable 25 Gram(s) IV Push once  dextrose 50% Injectable 25 Gram(s) IV Push once  escitalopram 10 milliGRAM(s) Oral daily  glucagon  Injectable 1 milliGRAM(s) IntraMuscular once  insulin glargine Injectable (LANTUS) 20 Unit(s) SubCutaneous two times a day  insulin lispro (ADMELOG) corrective regimen sliding scale   SubCutaneous three times a day before meals  insulin lispro Injectable (ADMELOG) 6 Unit(s) SubCutaneous three times a day before meals  mirtazapine 15 milliGRAM(s) Oral at bedtime    MEDICATIONS  (PRN):  dextrose Oral Gel 15 Gram(s) Oral once PRN Blood Glucose LESS THAN 70 milliGRAM(s)/deciliter  haloperidol     Tablet 5 milliGRAM(s) Oral every 6 hours PRN agitation  hydrOXYzine hydrochloride 50 milliGRAM(s) Oral every 6 hours PRN anxiety

## 2024-02-29 NOTE — BH INPATIENT PSYCHIATRY PROGRESS NOTE - NSTXSUICIDINTERMD_PSY_ALL_CORE
Medications management, encourage groups/DBT, suicide precautions, routine checks

## 2024-02-29 NOTE — BH INPATIENT PSYCHIATRY PROGRESS NOTE - NSBHATTESTBILLONSITE_PSY_A_CORE
JAZMIN to bill

## 2024-02-29 NOTE — BH INPATIENT PSYCHIATRY PROGRESS NOTE - NSTXSUICIDDATETRGT_PSY_ALL_CORE
27-Feb-2024
19-Feb-2024
19-Feb-2024
27-Feb-2024
05-Mar-2024
27-Feb-2024
19-Feb-2024
05-Mar-2024
19-Feb-2024
27-Feb-2024
27-Feb-2024
19-Feb-2024
05-Mar-2024

## 2024-02-29 NOTE — BH INPATIENT PSYCHIATRY PROGRESS NOTE - NSTXDEPRESPROGRES_PSY_ALL_CORE
Met - goal discontinued
Met - goal discontinued
Improving
Met - goal discontinued
Met - goal discontinued
Improving
Improving

## 2024-02-29 NOTE — BH INPATIENT PSYCHIATRY PROGRESS NOTE - NSTXANXDATEEST_PSY_ALL_CORE
12-Feb-2024

## 2024-02-29 NOTE — BH INPATIENT PSYCHIATRY PROGRESS NOTE - NSTXANXDATETRGT_PSY_ALL_CORE
19-Feb-2024
27-Feb-2024
19-Feb-2024
19-Feb-2024
27-Feb-2024
05-Mar-2024
05-Mar-2024
27-Feb-2024
19-Feb-2024
05-Mar-2024
19-Feb-2024

## 2024-02-29 NOTE — BH INPATIENT PSYCHIATRY PROGRESS NOTE - NSTXDIABDATETRGT_PSY_ALL_CORE
14-Feb-2024
05-Mar-2024
14-Feb-2024
27-Feb-2024
14-Feb-2024
05-Mar-2024
14-Feb-2024
27-Feb-2024
27-Feb-2024
05-Mar-2024
14-Feb-2024
27-Feb-2024
27-Feb-2024

## 2024-02-29 NOTE — BH INPATIENT PSYCHIATRY PROGRESS NOTE - NSBHCHARTREVIEWLAB_PSY_A_CORE FT
14.5   4.88  )-----------( 309      ( 08 Feb 2024 11:47 )             42.4   

## 2024-02-29 NOTE — BH INPATIENT PSYCHIATRY PROGRESS NOTE - NSTXDIABGOAL_PSY_ALL_CORE
Will identify modifiable risk factors and strategies to counteract them
Will verbalize 2 signs and symptoms of hypo and hyperglycemia
Will verbalize 2 signs and symptoms of hypo and hyperglycemia
Will identify modifiable risk factors and strategies to counteract them
Will verbalize 2 signs and symptoms of hypo and hyperglycemia
Will identify modifiable risk factors and strategies to counteract them
Will verbalize 2 signs and symptoms of hypo and hyperglycemia
Will identify modifiable risk factors and strategies to counteract them
Will verbalize 2 signs and symptoms of hypo and hyperglycemia

## 2024-02-29 NOTE — BH INPATIENT PSYCHIATRY PROGRESS NOTE - NSBHMSETHTCONTENT_PSY_A_CORE
Unremarkable/Ruminations
Ruminations
Unremarkable/Ruminations
Ruminations
Unremarkable/Ruminations
Ruminations

## 2024-02-29 NOTE — BH INPATIENT PSYCHIATRY PROGRESS NOTE - NSTXDIABPROGRES_PSY_ALL_CORE
Sunscreen Recommendations: I recommended a broad spectrum sunscreen with a SPF of 30 or higher. I explained that SPF 30 sunscreens block approximately 97 percent of the sun's harmful rays. Sunscreens should be applied at least 15 minutes prior to expected sun exposure and then every 2 hours after that as long as sun exposure continues. If swimming or exercising sunscreen should be reapplied every 45 minutes to an hour after getting wet or sweating. Recommended protective clothing and sun avoidance at peak hours.
Detail Level: Simple
Detail Level: Generalized
Detail Level: Detailed
Improving
Met - goal discontinued
Improving
Met - goal discontinued
Improving
Improving
Met - goal discontinued
Improving
Met - goal discontinued
Improving

## 2024-02-29 NOTE — BH INPATIENT PSYCHIATRY PROGRESS NOTE - NSTXDIABINTERMD_PSY_ALL_CORE
Psychoeducation, RD consult, hospitalist consult

## 2024-02-29 NOTE — BH INPATIENT PSYCHIATRY PROGRESS NOTE - NSTXDEPRESDATEEST_PSY_ALL_CORE
12-Feb-2024
13-Feb-2024
12-Feb-2024
13-Feb-2024
12-Feb-2024
13-Feb-2024

## 2024-02-29 NOTE — BH INPATIENT PSYCHIATRY PROGRESS NOTE - NSTXSUICIDGOAL_PSY_ALL_CORE
Will verbalize a decrease in preoccupation with suicidal thoughts and / or intent to commit suicide to 2 on a 10-point scale
Will verbalize a decrease in preoccupation with suicidal thoughts and / or intent to commit suicide to 2 on a 10-point scale
Be able to state 3 reasons for living
Will verbalize a decrease in preoccupation with suicidal thoughts and / or intent to commit suicide to 2 on a 10-point scale
Be able to state 3 reasons for living
Be able to state 3 reasons for living
Will verbalize a decrease in preoccupation with suicidal thoughts and / or intent to commit suicide to 2 on a 10-point scale
Be able to state 3 reasons for living
Will verbalize a decrease in preoccupation with suicidal thoughts and / or intent to commit suicide to 2 on a 10-point scale
Be able to state 3 reasons for living
Be able to state 3 reasons for living

## 2024-02-29 NOTE — BH INPATIENT PSYCHIATRY PROGRESS NOTE - NSBHMSEINSIGHT_PSY_A_CORE
Fair
Cheiloplasty (Complex) Text: A decision was made to reconstruct the defect with a  cheiloplasty.  The defect was undermined extensively.  Additional obicularis oris muscle was excised with a 15c blade scalpel.  The defect was converted into a full thickness wedge to facilite a better cosmetic result.  Small vessels were then tied off with 5-0 monocyrl. The obicularis oris, superficial fascia, adipose and dermis were then reapproximated.  After the deeper layers were approximated the epidermis was reapproximated with particular care given to realign the vermilion border.

## 2024-02-29 NOTE — BH INPATIENT PSYCHIATRY PROGRESS NOTE - PRN MEDS
MEDICATIONS  (PRN):  dextrose Oral Gel 15 Gram(s) Oral once PRN Blood Glucose LESS THAN 70 milliGRAM(s)/deciliter  haloperidol     Tablet 5 milliGRAM(s) Oral every 6 hours PRN agitation  hydrOXYzine hydrochloride 50 milliGRAM(s) Oral every 6 hours PRN anxiety  
MEDICATIONS  (PRN):  dextrose Oral Gel 15 Gram(s) Oral once PRN Blood Glucose LESS THAN 70 milliGRAM(s)/deciliter  haloperidol     Tablet 5 milliGRAM(s) Oral every 6 hours PRN agitation  hydrOXYzine hydrochloride 50 milliGRAM(s) Oral every 6 hours PRN anxiety  
MEDICATIONS  (PRN):  dextrose Oral Gel 15 Gram(s) Oral once PRN Blood Glucose LESS THAN 70 milliGRAM(s)/deciliter  haloperidol     Tablet 5 milliGRAM(s) Oral every 6 hours PRN agitation  hydrOXYzine hydrochloride 50 milliGRAM(s) Oral every 6 hours PRN anxiety  LORazepam     Tablet 2 milliGRAM(s) Oral every 6 hours PRN combative bx  traZODone 50 milliGRAM(s) Oral at bedtime PRN insomnia  
MEDICATIONS  (PRN):  dextrose Oral Gel 15 Gram(s) Oral once PRN Blood Glucose LESS THAN 70 milliGRAM(s)/deciliter  haloperidol     Tablet 5 milliGRAM(s) Oral every 6 hours PRN agitation  hydrOXYzine hydrochloride 50 milliGRAM(s) Oral every 6 hours PRN anxiety  LORazepam     Tablet 2 milliGRAM(s) Oral every 6 hours PRN combative bx  traZODone 50 milliGRAM(s) Oral at bedtime PRN insomnia  
MEDICATIONS  (PRN):  dextrose Oral Gel 15 Gram(s) Oral once PRN Blood Glucose LESS THAN 70 milliGRAM(s)/deciliter  haloperidol     Tablet 5 milliGRAM(s) Oral every 6 hours PRN agitation  hydrOXYzine hydrochloride 50 milliGRAM(s) Oral every 6 hours PRN anxiety  
MEDICATIONS  (PRN):  dextrose Oral Gel 15 Gram(s) Oral once PRN Blood Glucose LESS THAN 70 milliGRAM(s)/deciliter  haloperidol     Tablet 5 milliGRAM(s) Oral every 6 hours PRN agitation  hydrOXYzine hydrochloride 50 milliGRAM(s) Oral every 6 hours PRN anxiety  
MEDICATIONS  (PRN):  dextrose Oral Gel 15 Gram(s) Oral once PRN Blood Glucose LESS THAN 70 milliGRAM(s)/deciliter  haloperidol     Tablet 5 milliGRAM(s) Oral every 6 hours PRN agitation  hydrOXYzine hydrochloride 50 milliGRAM(s) Oral every 6 hours PRN anxiety  LORazepam     Tablet 2 milliGRAM(s) Oral every 6 hours PRN combative bx  traZODone 50 milliGRAM(s) Oral at bedtime PRN insomnia  
MEDICATIONS  (PRN):  dextrose Oral Gel 15 Gram(s) Oral once PRN Blood Glucose LESS THAN 70 milliGRAM(s)/deciliter  haloperidol     Tablet 5 milliGRAM(s) Oral every 6 hours PRN agitation  hydrOXYzine hydrochloride 50 milliGRAM(s) Oral every 6 hours PRN anxiety  LORazepam     Tablet 2 milliGRAM(s) Oral every 6 hours PRN combative bx  traZODone 50 milliGRAM(s) Oral at bedtime PRN insomnia  
MEDICATIONS  (PRN):  dextrose Oral Gel 15 Gram(s) Oral once PRN Blood Glucose LESS THAN 70 milliGRAM(s)/deciliter  haloperidol     Tablet 5 milliGRAM(s) Oral every 6 hours PRN agitation  hydrOXYzine hydrochloride 50 milliGRAM(s) Oral every 6 hours PRN anxiety  
MEDICATIONS  (PRN):  dextrose Oral Gel 15 Gram(s) Oral once PRN Blood Glucose LESS THAN 70 milliGRAM(s)/deciliter  haloperidol     Tablet 5 milliGRAM(s) Oral every 6 hours PRN agitation  hydrOXYzine hydrochloride 50 milliGRAM(s) Oral every 6 hours PRN anxiety  LORazepam     Tablet 2 milliGRAM(s) Oral every 6 hours PRN combative bx  traZODone 50 milliGRAM(s) Oral at bedtime PRN insomnia  
MEDICATIONS  (PRN):  dextrose Oral Gel 15 Gram(s) Oral once PRN Blood Glucose LESS THAN 70 milliGRAM(s)/deciliter  haloperidol     Tablet 5 milliGRAM(s) Oral every 6 hours PRN agitation  hydrOXYzine hydrochloride 50 milliGRAM(s) Oral every 6 hours PRN anxiety  
MEDICATIONS  (PRN):  dextrose Oral Gel 15 Gram(s) Oral once PRN Blood Glucose LESS THAN 70 milliGRAM(s)/deciliter  haloperidol     Tablet 5 milliGRAM(s) Oral every 6 hours PRN agitation  hydrOXYzine hydrochloride 50 milliGRAM(s) Oral every 6 hours PRN anxiety  traZODone 50 milliGRAM(s) Oral at bedtime PRN insomnia  
MEDICATIONS  (PRN):  dextrose Oral Gel 15 Gram(s) Oral once PRN Blood Glucose LESS THAN 70 milliGRAM(s)/deciliter  haloperidol     Tablet 5 milliGRAM(s) Oral every 6 hours PRN agitation  hydrOXYzine hydrochloride 50 milliGRAM(s) Oral every 6 hours PRN anxiety  traZODone 100 milliGRAM(s) Oral at bedtime PRN insomnia

## 2024-02-29 NOTE — BH INPATIENT PSYCHIATRY PROGRESS NOTE - NSBHFUPINTERVALCCFT_PSY_A_CORE
"I'm feeling better"
"All right"
"Didn't sleep good"
"Same"
"Fine"
"I'm feeling somewhat better"
"I'm all right"
"Ok" "neutral" 
"The shaking is better"
"I'm working on accepting"
"The shaking is a little better"
"I am still beating myself up"   "it is hard for me to stop" (describing intrusive ruminations)
"I'm good"

## 2024-02-29 NOTE — BH INPATIENT PSYCHIATRY PROGRESS NOTE - NSBHATTESTATTENDNAMEFT_PSY_A_CORE
Dr. Allison
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
## 2024-02-29 NOTE — BH INPATIENT PSYCHIATRY PROGRESS NOTE - NSBHCONSULTIPREASON_PSY_A_CORE
This procedure was 40% more difficult and required 40% more work secondary to the patient's habitus. The patient has a BMI of 37.6 and has comorbidities of atrial fibrillation on chronic anticoagulation, obstructive sleep apnea, venous insufficiency, hypertension, and osteoarthritis. This required increased work for safe and proper positioning upon the fluoroscopy table, increased needle passes for safe and appropriate needle placement, and increased fluoroscopy time and radiation exposure for proper visualization.     
danger to self; mental illness expected to respond to inpatient care

## 2024-02-29 NOTE — BH INPATIENT PSYCHIATRY PROGRESS NOTE - NSICDXBHTERTIARYDX_PSY_ALL_CORE
R/O Bipolar disorder   F31.9  

## 2024-02-29 NOTE — BH INPATIENT PSYCHIATRY PROGRESS NOTE - NSCGISEVERILLNESS_PSY_ALL_CORE
4 = Moderately ill – overt symptoms causing noticeable, but modest, functional impairment or distress; symptom level may warrant medication
1 = Normal – not at all ill, symptoms of disorder not present past seven days

## 2024-02-29 NOTE — BH INPATIENT PSYCHIATRY PROGRESS NOTE - NSBHCHARTREVIEWVS_PSY_A_CORE FT
Vital Signs Last 24 Hrs  T(C): 35.8 (02-29-24 @ 10:49), Max: 36.2 (02-28-24 @ 16:10)  T(F): 96.5 (02-29-24 @ 10:49), Max: 97.2 (02-28-24 @ 16:10)  HR: 98 (02-29-24 @ 10:49) (92 - 98)  BP: 139/89 (02-29-24 @ 10:49) (139/89 - 142/90)  BP(mean): --  RR: 18 (02-29-24 @ 10:49) (16 - 18)  SpO2: --

## 2024-02-29 NOTE — BH INPATIENT PSYCHIATRY PROGRESS NOTE - NSBHATTESTTYPEVISIT_PSY_A_CORE
On-site Attending supervising JAZMIN (99XXX codes)
On-site Attending supervising JAZMIN (99XXX codes)
Attending Only
On-site Attending supervising JAZMIN (99XXX codes)
On-site Attending supervising JAZMIN (99XXX codes)
JAZMIN without on-site Attending supervision
On-site Attending supervising JAZMIN (99XXX codes)
JAZMIN without on-site Attending supervision
On-site Attending supervising JAZMIN (99XXX codes)

## 2024-02-29 NOTE — BH INPATIENT PSYCHIATRY PROGRESS NOTE - NSBHCONTPROVIDER_PSY_ALL_CORE
[FreeTextEntry1] : SURGERY SUMMARY\par ___________________________________________________________________________________\par \par \par Dear DR. ЕЛЕНА WOLFE,\par \par Today I performed surgery on MARTHA MAZARIEGOS.  A summary of today's surgery is attached. He tolerated the procedure well. \par \par Thank you for allowing me to take part in MARTHA's care. I will keep you abreast of his progress.\par \par Sincerely yours,\par \par Paulo\par \par Paulo Jane MD, FACS, FSPU\par Director, Genital Reconstruction\par [facility]\par Division of Pediatric Urology\par Tel: [phone]\par \par ___________________________________________________________________________________\par 
Not applicable

## 2024-02-29 NOTE — BH INPATIENT PSYCHIATRY PROGRESS NOTE - NSTXANXINTERMD_PSY_ALL_CORE
Medications management, encourage groups/DBT

## 2024-02-29 NOTE — BH INPATIENT PSYCHIATRY PROGRESS NOTE - NSBHMSEAFFRANGE_PSY_A_CORE
Constricted
Full
Constricted
Full
Constricted
Full
Constricted

## 2024-02-29 NOTE — BH INPATIENT PSYCHIATRY PROGRESS NOTE - NSTXDEPRESDATETRGT_PSY_ALL_CORE
05-Mar-2024

## 2024-03-04 ENCOUNTER — OUTPATIENT (OUTPATIENT)
Dept: OUTPATIENT SERVICES | Facility: HOSPITAL | Age: 29
LOS: 1 days | End: 2024-03-04
Payer: MEDICAID

## 2024-03-04 ENCOUNTER — APPOINTMENT (OUTPATIENT)
Dept: PSYCHIATRY | Facility: CLINIC | Age: 29
End: 2024-03-04

## 2024-03-04 DIAGNOSIS — F33.3 MAJOR DEPRESSIVE DISORDER, RECURRENT, SEVERE WITH PSYCHOTIC SYMPTOMS: ICD-10-CM

## 2024-03-04 DIAGNOSIS — F33.9 MAJOR DEPRESSIVE DISORDER, RECURRENT, UNSPECIFIED: ICD-10-CM

## 2024-03-04 PROCEDURE — 90791 PSYCH DIAGNOSTIC EVALUATION: CPT

## 2024-03-04 NOTE — SOCIAL HISTORY
[FreeTextEntry1] : Employment history: employed as a food runner. Developmental history: ADHD  Sexual hx/identity Sexual History/ Concern (include sexual orientation and other relevant information) " Heterosexual." Race - ethnicity - culture information: American Social supports (friends, Volunteers, club, AA meeting, other meetings ) ? : Friends, and martial arts" Meaningful Activities: "Martial Arts, Gym"   Spiritual Assessment Tool - DAVID SANTOS. What is your harvinder or belief? "i don't conform to any practice".  Do you consider yourself spiritual or Samaritan? "Spiritual not Samaritan" Is there something you believe in that gives meaning to your life? "i believe in higher power" I: Is it important in your life? "neutral" What influence does it have on how you take care of yourself? "neutral" How have your beliefs influenced your behavior during this illness? What role do your beliefs play in regaining your health? "none" C. Are you part of a spiritual or Samaritan community? "no" Is this of support to you and how? "n/a" Is there a person or group of people you really love or who are really important to you? "yes, friends and family" H. We have been discussing your belief and supports. What else gives you internal support? "Distract myself with videogames, meditation." What are your sources of hope, strength, comfort and peace? "myself" What do you hold on to during difficult times? "Nothing really" what sustains you and keeps you going? "myself" A. How would you like me, your healthcare provider, to address these issues in your healthcare? "to have a psych eval, med management"
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
## 2024-03-04 NOTE — HEALTH RISK ASSESSMENT
[With Patient/Caregiver] : , with patient/caregiver [With Patient/Collateral] : , with patient/collateral [AdvancecareDate] : 3/4/2024 [] : 3/4/2024

## 2024-03-04 NOTE — REASON FOR VISIT
[OK  to leave message] : OK  to leave message [Number can be texted] : number can be texted [Psychiatric Inpatient] : Psychiatric Inpatient [[facility] Provider/Facility] : [facility] Provider/Facility [Patient] : Patient [Prior Medical Records] : Prior Medical Records [FreeTextEntry4] : 1:30pm [FreeTextEntry5] : English [FreeTextEntry6] : Ruy [FreeTextEntry7] : He [FreeTextEntry2] : Suicide attempt. MDD [FreeTextEntry1] : MDD, med management
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
## 2024-03-04 NOTE — SOCIAL HISTORY
[FreeTextEntry1] : Employment history: employed as a food runner. Developmental history: ADHD  Sexual hx/identity Sexual History/ Concern (include sexual orientation and other relevant information) " Heterosexual." Race - ethnicity - culture information: American Social supports (friends, Volunteers, club, AA meeting, other meetings ) ? : Friends, and martial arts" Meaningful Activities: "Martial Arts, Gym"   Spiritual Assessment Tool - DAVID SANTOS. What is your harvinder or belief? "i don't conform to any practice".  Do you consider yourself spiritual or Methodist? "Spiritual not Methodist" Is there something you believe in that gives meaning to your life? "i believe in higher power" I: Is it important in your life? "neutral" What influence does it have on how you take care of yourself? "neutral" How have your beliefs influenced your behavior during this illness? What role do your beliefs play in regaining your health? "none" C. Are you part of a spiritual or Methodist community? "no" Is this of support to you and how? "n/a" Is there a person or group of people you really love or who are really important to you? "yes, friends and family" H. We have been discussing your belief and supports. What else gives you internal support? "Distract myself with videogames, meditation." What are your sources of hope, strength, comfort and peace? "myself" What do you hold on to during difficult times? "Nothing really" what sustains you and keeps you going? "myself" A. How would you like me, your healthcare provider, to address these issues in your healthcare? "to have a psych eval, med management"
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
## 2024-03-04 NOTE — FAMILY HISTORY
[FreeTextEntry1] : Family composition: The only child. Family history and background: Born and raised in [place].  Family relationship: Good Pertinent Family Medical, MH and Substance Use History including Adult Child of Alcoholic and child of substance abuse status; history of cancer and heart disease: unsure medical/mental dx of family members.
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
## 2024-03-04 NOTE — PHYSICAL EXAM
[Individual reports use of the following tobacco products during the last 30 days?] : Individual reports use of the following tobacco products during the last 30 days? No [Individual reports tobacco use during the last 30 days?] : Individual reports tobacco use during the last 30 days? No [Individual reports current use of tobacco cessation medication or nicotine replacement therapy?] : Individual reports current use of tobacco cessation medication or nicotine replacement therapy? No

## 2024-03-04 NOTE — REASON FOR VISIT
[OK  to leave message] : OK  to leave message [Number can be texted] : number can be texted [Psychiatric Inpatient] : Psychiatric Inpatient [[facility] Provider/Facility] : [facility] Provider/Facility [Patient] : Patient [Prior Medical Records] : Prior Medical Records [FreeTextEntry4] : 1:30pm [FreeTextEntry5] : English [FreeTextEntry6] : Ruy [FreeTextEntry7] : He [FreeTextEntry2] : Suicide attempt. MDD [FreeTextEntry1] : MDD, med management
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
## 2024-03-04 NOTE — PSYCHOSOCIAL ASSESSMENT
[None known] : None known [HS Diploma or GED] : HS Diploma or GED [Yes (select details below)] : Have you ever experienced this type of event? Yes [has been constantly on guard, watchful, or easily startled] : has been constantly on guard, watchful, or easily startled [felt guilty or unable to stop blaming yourself or others for the event(s) or any problems the event(s) may have caused] : has felt guilty or unable to stop blaming self or others for event(s), or any problems the event(s) may have caused [felt numb or detached from people, activities, or your surrounding] : has felt numb or detached from people, activities, or surroundings [Competitive and integrated employment] : Competitive and integrated employment [15 - 34 hours] : 15 - 34 hours [Dependent] : dependent [Earned income] : earned income [Financial difficulties] : financial difficulties [None] : none [Unable to pay bills] : unable to pay bills [Client's parents (biological, adoptive, stepparent)] : client's parents (biological, adoptive, stepparent) [Mother] : mother [Good] : good [Lives with Family Member] : lives with family member [No] : Patient has personal representation (legal guardian, representative payee, conservatorship)? No [FreeTextEntry1] : Patient does not smoke.  [had nightmares about the event(s) or thought about the event(s) when you did not want] : did not have nightmares and/or unwanted thoughts about the events [tried hard not to think about the event(s) or went out of your way to avoid situations that reminded you of the event] : did not need to avoid thinking about events, did not need to avoid situations that might remind patient of events [FreeTextEntry7] : Araceli Cobos Mother [phone]

## 2024-03-04 NOTE — PSYCHOSOCIAL ASSESSMENT
[None known] : None known [HS Diploma or GED] : HS Diploma or GED [Yes (select details below)] : Have you ever experienced this type of event? Yes [has been constantly on guard, watchful, or easily startled] : has been constantly on guard, watchful, or easily startled [felt numb or detached from people, activities, or your surrounding] : has felt numb or detached from people, activities, or surroundings [felt guilty or unable to stop blaming yourself or others for the event(s) or any problems the event(s) may have caused] : has felt guilty or unable to stop blaming self or others for event(s), or any problems the event(s) may have caused [Competitive and integrated employment] : Competitive and integrated employment [15 - 34 hours] : 15 - 34 hours [Dependent] : dependent [Earned income] : earned income [Financial difficulties] : financial difficulties [None] : none [Unable to pay bills] : unable to pay bills [Client's parents (biological, adoptive, stepparent)] : client's parents (biological, adoptive, stepparent) [Mother] : mother [Good] : good [Lives with Family Member] : lives with family member [No] : Patient has personal representation (legal guardian, representative payee, conservatorship)? No [FreeTextEntry1] : Patient does not smoke.  [had nightmares about the event(s) or thought about the event(s) when you did not want] : did not have nightmares and/or unwanted thoughts about the events [tried hard not to think about the event(s) or went out of your way to avoid situations that reminded you of the event] : did not need to avoid thinking about events, did not need to avoid situations that might remind patient of events [FreeTextEntry7] : Araceli Cobos Mother [phone]

## 2024-03-04 NOTE — FAMILY HISTORY
[FreeTextEntry1] : Family composition: The only child. Family history and background: Born and raised in [place].  Family relationship: Good Pertinent Family Medical, MH and Substance Use History including Adult Child of Alcoholic and child of substance abuse status; history of cancer and heart disease: unsure medical/mental dx of family members.

## 2024-03-04 NOTE — PHYSICAL EXAM
[Individual reports tobacco use during the last 30 days?] : Individual reports tobacco use during the last 30 days? No [Individual reports use of the following tobacco products during the last 30 days?] : Individual reports use of the following tobacco products during the last 30 days? No [Individual reports current use of tobacco cessation medication or nicotine replacement therapy?] : Individual reports current use of tobacco cessation medication or nicotine replacement therapy? No

## 2024-03-05 DIAGNOSIS — T38.3X2A POISONING BY INSULIN AND ORAL HYPOGLYCEMIC [ANTIDIABETIC] DRUGS, INTENTIONAL SELF-HARM, INITIAL ENCOUNTER: ICD-10-CM

## 2024-03-05 DIAGNOSIS — F90.9 ATTENTION-DEFICIT HYPERACTIVITY DISORDER, UNSPECIFIED TYPE: ICD-10-CM
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
## 2024-03-05 DIAGNOSIS — E10.9 TYPE 1 DIABETES MELLITUS WITHOUT COMPLICATIONS: ICD-10-CM

## 2024-03-05 DIAGNOSIS — F33.9 MAJOR DEPRESSIVE DISORDER, RECURRENT, UNSPECIFIED: ICD-10-CM

## 2024-03-05 DIAGNOSIS — F33.3 MAJOR DEPRESSIVE DISORDER, RECURRENT, SEVERE WITH PSYCHOTIC SYMPTOMS: ICD-10-CM
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
## 2024-03-11 ENCOUNTER — APPOINTMENT (OUTPATIENT)
Dept: PSYCHIATRY | Facility: CLINIC | Age: 29
End: 2024-03-11

## 2024-03-13 ENCOUNTER — OUTPATIENT (OUTPATIENT)
Dept: OUTPATIENT SERVICES | Facility: HOSPITAL | Age: 29
LOS: 1 days | End: 2024-03-13
Payer: MEDICAID

## 2024-03-13 ENCOUNTER — APPOINTMENT (OUTPATIENT)
Dept: ENDOCRINOLOGY | Facility: CLINIC | Age: 29
End: 2024-03-13
Payer: MEDICAID

## 2024-03-13 VITALS
BODY MASS INDEX: 20.88 KG/M2 | WEIGHT: 141 LBS | SYSTOLIC BLOOD PRESSURE: 132 MMHG | HEIGHT: 69 IN | DIASTOLIC BLOOD PRESSURE: 85 MMHG | HEART RATE: 101 BPM

## 2024-03-13 DIAGNOSIS — Z00.00 ENCOUNTER FOR GENERAL ADULT MEDICAL EXAMINATION WITHOUT ABNORMAL FINDINGS: ICD-10-CM

## 2024-03-13 DIAGNOSIS — E10.9 TYPE 1 DIABETES MELLITUS W/OUT COMPLICATIONS: ICD-10-CM

## 2024-03-13 PROCEDURE — 99214 OFFICE O/P EST MOD 30 MIN: CPT

## 2024-03-13 RX ORDER — INSULIN GLARGINE 100 [IU]/ML
100 INJECTION, SOLUTION SUBCUTANEOUS DAILY
Qty: 1 | Refills: 3 | Status: DISCONTINUED | COMMUNITY
Start: 2023-08-17 | End: 2024-03-13

## 2024-03-13 NOTE — ASSESSMENT
[Diabetes Foot Care] : diabetes foot care [Long Term Vascular Complications] : long term vascular complications of diabetes [Carbohydrate Consistent Diet] : carbohydrate consistent diet [Importance of Diet and Exercise] : importance of diet and exercise to improve glycemic control, achieve weight loss and improve cardiovascular health [Exercise/Effect on Glucose] : exercise/effect on glucose [Hypoglycemia Management] : hypoglycemia management [Self Monitoring of Blood Glucose] : self monitoring of blood glucose [Insulin Self-Administration] : insulin self-administration [Weight Loss] : weight loss [Retinopathy Screening] : Patient was referred to ophthalmology for retinopathy screening [FreeTextEntry1] : Mr. CARA HUITRON Is a 28 year old male who presents for follow up     # poorly controlled type 1 DM , hypoglycemia  / Suicidal attempt 2/2024 by overdosing insulin   Current Regimen: Tresiba 16 units BID , Admelog ISF : 1:20 ,, I: C 1: 10 g ( average 30 units daily ) Previous regimens: had Omnipod insulin pump and DEXCOM but stopped because of insurance ( for 2 years now ) SMBG/CGM : checking 4-5 times/ day no real pattern very erratic between hypo and hyperglycemia, will benefit from having CGM again - continue same with insulin until more data log or CGM available as no real pattern  - need ophthalmology/ podiatry  referral -BP ok - followed now by psychiatry for depression and feels better now , discussed seeking help if having any suicidal ideas  - need labs and follow up

## 2024-03-13 NOTE — REVIEW OF SYSTEMS
[As Noted in HPI] : as noted in HPI [Pain/Numbness of Digits] : pain/numbness of digits [Fatigue] : no fatigue [Recent Weight Gain (___ Lbs)] : no recent weight gain [Recent Weight Loss (___ Lbs)] : no recent weight loss [Visual Field Defect] : no visual field defect [Blurred Vision] : no blurred vision [Dysphagia] : no dysphagia [Neck Pain] : no neck pain [Dysphonia] : no dysphonia [Chest Pain] : no chest pain [Palpitations] : no palpitations [Shortness Of Breath] : no shortness of breath [Lower Ext Edema] : no lower extremity edema [Nausea] : no nausea [SOB on Exertion] : no shortness of breath on exertion [Constipation] : no constipation [Vomiting] : no vomiting [Diarrhea] : no diarrhea [Headaches] : no headaches [Dizziness] : no dizziness [Tremors] : no tremors [Cold Intolerance] : no cold intolerance [Heat Intolerance] : no heat intolerance [de-identified] : improved followed by psychiatry.

## 2024-03-13 NOTE — DATA REVIEWED
[FreeTextEntry1] : reviewed 5/2022 labs in [facility]  2/2023: A1c 8.6%   crea 0.7    labs in [facility]IE from recent admission to hospital 2/2024

## 2024-03-13 NOTE — HISTORY OF PRESENT ILLNESS
[Finger Stick] : Finger Stick: Yes [FreeTextEntry1] : Mr. CARA HUITRON  Is  a 28 year  old male who presents for follow up  type 1 Dm , was hospitalized for suicide attempt by taking more insulin      Diagnosis: at the age of 9  Current Regimen:  Tresiba 16 units BID  , Admelog     ISF : 1:20 ,, I: C 1: 10 g ( average 30 units daily )  since discharge  Previous regimens: had Omnipod insulin pump and DEXCOM but stopped because of insurance ( for 2 years now )  Compliance: good  SMBCGM :  checking 4-5 times/ day Hypoglycemia:  no major  Polyuria/polydipsia : no  Weight change/BMI: same  Diet: breakfast , lunch ( biggest meal ) , dinner  Exercise: active all day  HBa1c trend:  5/2022 Ac1c > 9 % in DKA ...8.6%( 2/2023) ... very high in hospital   .prevention   Statin: was on lipitor then taken off as LDL was ok  ACE/ARB : no  Eye examination: not recently   Neuropathy: yes   Since hospital discharge he reports feeling better, taking insulin no loggs reports no hypoglycemia and numbers can spike post meals

## 2024-03-13 NOTE — PHYSICAL EXAM
[Alert] : alert [No Acute Distress] : no acute distress [No Proptosis] : no proptosis [No Lid Lag] : no lid lag [Thyroid Not Enlarged] : the thyroid was not enlarged [No Thyroid Nodules] : no palpable thyroid nodules [No Accessory Muscle Use] : no accessory muscle use [No Respiratory Distress] : no respiratory distress [Clear to Auscultation] : lungs were clear to auscultation bilaterally [Regular Rhythm] : with a regular rhythm [No Murmurs] : no murmurs [No Edema] : no peripheral edema [Soft] : abdomen soft [Not Tender] : non-tender [No Stigmata of Cushings Syndrome] : no stigmata of Cushings Syndrome [No Tremors] : no tremors [Oriented x3] : oriented to person, place, and time [Abdominal Striae] : no abdominal striae [Acanthosis Nigricans] : no acanthosis nigricans

## 2024-03-14 DIAGNOSIS — E10.9 TYPE 1 DIABETES MELLITUS WITHOUT COMPLICATIONS: ICD-10-CM

## 2024-03-14 DIAGNOSIS — E10.65 TYPE 1 DIABETES MELLITUS WITH HYPERGLYCEMIA: ICD-10-CM

## 2024-03-14 DIAGNOSIS — E16.2 HYPOGLYCEMIA, UNSPECIFIED: ICD-10-CM

## 2024-04-09 NOTE — BH PATIENT PROFILE - HOME MEDICATIONS
Zoloft 50 mg oral tablet , 1 tab(s) orally once a day  melatonin 3 mg oral tablet , 1 tab(s) orally once a day (at bedtime) as needed for Insomnia  HumaLOG KwikPen 100 units/mL injectable solution , 8 unit(s) injectable 3 times a day (before meals)  insulin glargine 100 units/mL subcutaneous solution , 22 unit(s) subcutaneous once a day (in the morning)  
English

## 2024-04-15 NOTE — CDI QUERY NOTE - NSCDIOTHERTXTBX_GEN_ALL_CORE_HH
Clinical documentation and/or evidence in the medical record indicates that this patient has diabetes.  In order to ensure accurate coding and accuracy of the clinical record, the documentation in this patient’s medical record requires additional clarification.      Please include more specific documentation as to the type of diabetes in your progress note and/or discharge summary.    Query:   Can the diabetes be further clarified as:     •	Diabetes type 1 with hyperglycemia  •	Diabetes type 1 poorly controlled  •	Other (specify):    Supporting documentation and/or clinical evidence:   2/10/24 Progress note:  a/p  28 year old with hx of dm type 1, sp od with insulin on 2/7,   endo and psychiatrist consults appreciated  insulin adjusted  POCT Blood Glucose.: 123 mg/dL (02-10-24 @ 11:43)  POCT Blood Glucose.: 199 mg/dL (02-10-24 @ 08:10)  POCT Blood Glucose.: 229 mg/dL (02-10-24 @ 02:03)  POCT Blood Glucose.: 83 mg/dL (02-09-24 @ 21:11)  POCT Blood Glucose.: 199 mg/dL (02-09-24 @ 18:34)  POCT Blood Glucose.: 182 mg/dL (02-09-24 @ 16:30)    dw pshyciatry team, pt is medically stable but requires pshyciatric inpt care and treatment,   dc to [facility]     LABS:  2/8/24 A1C = 10.8  2/8/24 Estimated Average Glucose = 263    Treatment:   -	20 Units Lantus every morning  -	Sliding Scale Admelog 3x/day before meals  -	Lantus 22 units at bedtime

## 2024-06-26 ENCOUNTER — OUTPATIENT (OUTPATIENT)
Dept: OUTPATIENT SERVICES | Facility: HOSPITAL | Age: 29
LOS: 1 days | End: 2024-06-26
Payer: MEDICAID

## 2024-06-26 ENCOUNTER — APPOINTMENT (OUTPATIENT)
Dept: ENDOCRINOLOGY | Facility: CLINIC | Age: 29
End: 2024-06-26

## 2024-06-26 VITALS
SYSTOLIC BLOOD PRESSURE: 109 MMHG | BODY MASS INDEX: 22.15 KG/M2 | HEART RATE: 83 BPM | WEIGHT: 150 LBS | DIASTOLIC BLOOD PRESSURE: 75 MMHG

## 2024-06-26 DIAGNOSIS — E16.2 HYPOGLYCEMIA, UNSPECIFIED: ICD-10-CM

## 2024-06-26 DIAGNOSIS — E10.65 TYPE 1 DIABETES MELLITUS WITH HYPERGLYCEMIA: ICD-10-CM

## 2024-06-26 DIAGNOSIS — Z00.00 ENCOUNTER FOR GENERAL ADULT MEDICAL EXAMINATION WITHOUT ABNORMAL FINDINGS: ICD-10-CM

## 2024-06-26 PROCEDURE — 99213 OFFICE O/P EST LOW 20 MIN: CPT

## 2024-06-26 RX ORDER — PEN NEEDLE, DIABETIC 29 G X1/2"
31G X 5 MM NEEDLE, DISPOSABLE MISCELLANEOUS
Qty: 1 | Refills: 2 | Status: ACTIVE | COMMUNITY
Start: 2022-05-19 | End: 1900-01-01

## 2024-06-26 RX ORDER — BLOOD-GLUCOSE SENSOR
EACH MISCELLANEOUS
Qty: 3 | Refills: 3 | Status: DISCONTINUED | COMMUNITY
Start: 2022-05-19 | End: 2024-06-26

## 2024-06-26 RX ORDER — INSULIN DEGLUDEC INJECTION 200 U/ML
200 INJECTION, SOLUTION SUBCUTANEOUS
Qty: 1 | Refills: 3 | Status: ACTIVE | COMMUNITY
Start: 2022-05-19 | End: 1900-01-01

## 2024-06-26 RX ORDER — BLOOD-GLUCOSE SENSOR
EACH MISCELLANEOUS
Qty: 3 | Refills: 5 | Status: ACTIVE | COMMUNITY
Start: 2024-01-02 | End: 1900-01-01

## 2024-06-26 RX ORDER — BLOOD-GLUCOSE TRANSMITTER
EACH MISCELLANEOUS
Qty: 1 | Refills: 3 | Status: DISCONTINUED | COMMUNITY
Start: 2022-05-19 | End: 2024-06-26

## 2024-06-27 DIAGNOSIS — E05.00 THYROTOXICOSIS WITH DIFFUSE GOITER WITHOUT THYROTOXIC CRISIS OR STORM: ICD-10-CM

## 2024-06-27 DIAGNOSIS — E16.2 HYPOGLYCEMIA, UNSPECIFIED: ICD-10-CM

## 2024-06-27 DIAGNOSIS — E05.90 THYROTOXICOSIS, UNSPECIFIED WITHOUT THYROTOXIC CRISIS OR STORM: ICD-10-CM

## 2024-06-27 DIAGNOSIS — E10.65 TYPE 1 DIABETES MELLITUS WITH HYPERGLYCEMIA: ICD-10-CM

## 2024-07-16 ENCOUNTER — APPOINTMENT (OUTPATIENT)
Dept: PODIATRY | Facility: CLINIC | Age: 29
End: 2024-07-16

## 2024-10-16 ENCOUNTER — OUTPATIENT (OUTPATIENT)
Dept: OUTPATIENT SERVICES | Facility: HOSPITAL | Age: 29
LOS: 1 days | End: 2024-10-16
Payer: MEDICAID

## 2024-10-16 ENCOUNTER — OUTPATIENT (OUTPATIENT)
Dept: OUTPATIENT SERVICES | Facility: HOSPITAL | Age: 29
LOS: 1 days | End: 2024-10-16

## 2024-10-16 ENCOUNTER — APPOINTMENT (OUTPATIENT)
Dept: ENDOCRINOLOGY | Facility: CLINIC | Age: 29
End: 2024-10-16
Payer: MEDICAID

## 2024-10-16 VITALS
HEART RATE: 81 BPM | DIASTOLIC BLOOD PRESSURE: 75 MMHG | WEIGHT: 145 LBS | BODY MASS INDEX: 21.41 KG/M2 | SYSTOLIC BLOOD PRESSURE: 117 MMHG

## 2024-10-16 DIAGNOSIS — E10.65 TYPE 1 DIABETES MELLITUS WITH HYPERGLYCEMIA: ICD-10-CM

## 2024-10-16 DIAGNOSIS — E16.2 HYPOGLYCEMIA, UNSPECIFIED: ICD-10-CM

## 2024-10-16 DIAGNOSIS — Z00.00 ENCOUNTER FOR GENERAL ADULT MEDICAL EXAMINATION WITHOUT ABNORMAL FINDINGS: ICD-10-CM

## 2024-10-16 LAB
BASOPHILS # BLD AUTO: 0.06 K/UL
BASOPHILS NFR BLD AUTO: 1.3 %
EOSINOPHIL # BLD AUTO: 0.34 K/UL
EOSINOPHIL NFR BLD AUTO: 7.2 %
ESTIMATED AVERAGE GLUCOSE: 223 MG/DL
HBA1C MFR BLD HPLC: 9.4 %
HCT VFR BLD CALC: 46.6 %
HGB BLD-MCNC: 15.6 G/DL
IMM GRANULOCYTES NFR BLD AUTO: 0.2 %
LYMPHOCYTES # BLD AUTO: 1.36 K/UL
LYMPHOCYTES NFR BLD AUTO: 28.7 %
MAN DIFF?: NORMAL
MCHC RBC-ENTMCNC: 29.3 PG
MCHC RBC-ENTMCNC: 33.5 G/DL
MCV RBC AUTO: 87.6 FL
MONOCYTES # BLD AUTO: 0.23 K/UL
MONOCYTES NFR BLD AUTO: 4.9 %
NEUTROPHILS # BLD AUTO: 2.74 K/UL
NEUTROPHILS NFR BLD AUTO: 57.7 %
PLATELET # BLD AUTO: 307 K/UL
PMV BLD AUTO: 0 /100 WBCS
RBC # BLD: 5.32 M/UL
RBC # FLD: 12.1 %
T4 FREE SERPL-MCNC: 1.2 NG/DL
TSH SERPL-ACNC: 1.96 UIU/ML
WBC # FLD AUTO: 4.74 K/UL

## 2024-10-16 PROCEDURE — 99213 OFFICE O/P EST LOW 20 MIN: CPT

## 2024-10-16 NOTE — ASSESSMENT
[Diabetes Foot Care] : diabetes foot care [Long Term Vascular Complications] : long term vascular complications of diabetes [Carbohydrate Consistent Diet] : carbohydrate consistent diet [Importance of Diet and Exercise] : importance of diet and exercise to improve glycemic control, achieve weight loss and improve cardiovascular health [Exercise/Effect on Glucose] : exercise/effect on glucose [Hypoglycemia Management] : hypoglycemia management [Self Monitoring of Blood Glucose] : self monitoring of blood glucose [Insulin Self-Administration] : insulin self-administration [Retinopathy Screening] : Patient was referred to ophthalmology for retinopathy screening [Weight Loss] : weight loss [FreeTextEntry1] : Mr. ISIS JIMÉNEZ Is a 28 year old male who presents for follow up   # poorly controlled type 1 DM , hypoglycemia  / Suicidal attempt 2/2024 by overdosing insulin  - do labs today not don eyet  Current Regimen: Tresiba 26 units od , Admelog ISF : 1:20 ,, I: C 1: 10 g ( average 10-20u/meal ) Previous regimens: had Omnipod insulin pump and DEXCOM but stopped because of insurance ( for 2 years now ) SMBG/CGM : checking 4-5 times/ day no real pattern very erratic between hypo and hyperglycemia using Dexcom  - Make Tresiba 30 units od  in am - change I: C ratio to 1: 9 g and continue correction at 1: 20 target 120  - need ophthalmology/ podiatry referral - BP ok - did not follow up with psychiatry denies any depression now or suicidal ideation, advised to follow  discussed calling if any help needed to schedule psychiatry follow up  - Labs sent again and advised to do them

## 2024-10-16 NOTE — END OF VISIT
Dr. Nix
[Time Spent: ___ minutes] : I have spent [unfilled] minutes of time on the encounter which excludes teaching and separately reported services.
[Time Spent: ___ minutes] : I have spent [unfilled] minutes of time on the encounter which excludes teaching and separately reported services.

## 2024-10-16 NOTE — REVIEW OF SYSTEMS
[Pain/Numbness of Digits] : pain/numbness of digits [de-identified] : denies he stopped following with psychiatry  [As Noted in HPI] : as noted in HPI [Fatigue] : no fatigue [Recent Weight Gain (___ Lbs)] : no recent weight gain [Recent Weight Loss (___ Lbs)] : no recent weight loss [Visual Field Defect] : no visual field defect [Blurred Vision] : no blurred vision [Dysphagia] : no dysphagia [Neck Pain] : no neck pain [Dysphonia] : no dysphonia [Chest Pain] : no chest pain [Palpitations] : no palpitations [Lower Ext Edema] : no lower extremity edema [Shortness Of Breath] : no shortness of breath [SOB on Exertion] : no shortness of breath on exertion [Nausea] : no nausea [Constipation] : no constipation [Vomiting] : no vomiting [Diarrhea] : no diarrhea [Headaches] : no headaches [Dizziness] : no dizziness [Tremors] : no tremors [Depression] : no depression [Suicidal Ideation] : no suicidal ideation [Cold Intolerance] : no cold intolerance [Heat Intolerance] : no heat intolerance

## 2024-10-16 NOTE — DATA REVIEWED
[FreeTextEntry1] : reviewed 5/2022 labs in [facility]  2/2023: A1c 8.6%   crea 0.7    labs in [facility]  2/2024

## 2024-10-16 NOTE — HISTORY OF PRESENT ILLNESS
[Finger Stick] : Finger Stick: Yes [FreeTextEntry1] : Mr. ISIS JIMÉNEZ  Is  a 28 year old male kth DM1, hx of suicide attempt by insulin overdose, who presents for follow up  type 1 DM.    Diagnosis: at the age of 9  Current Regimen:  Tresiba 26 units in pm  , Admelog  ISF : 1:20 ,, I: C 1: 10 g (average  10-20 / meal ) Previous regimens: had Omnipod insulin pump and DEXCOM but stopped because of insurance (for 2 years now )  Compliance: good  SMBCGM :  checking 4-5 times/ day, Dexcom G7 , on [facility] data is from August , on clarity GMI 9.7%  Hypoglycemia:  yes  Polyuria/polydipsia : no  Weight change/BMI: same  Diet: breakfast , lunch (biggest meal) , dinner  Exercise: active all day  HBa1c trend:  5/2022 Ac1c > 9 % in DKA ...8.6%( 2/2023) ... GMI 9.7%   .prevention   Statin: was on lipitor then taken off as LDL was ok  ACE/ARB : no  Eye examination: not recently   Neuropathy: yes    10/2024: Mentions huge fluctuation in sugar between lows (40s) and high (400s). Connected to [facility] but reading only till august. Denies suicidal ideation or depression, not following with psych. Still didn't see podiatry nor ophthalmo. Didn't do blood work.

## 2024-10-16 NOTE — REVIEW OF SYSTEMS
[Pain/Numbness of Digits] : pain/numbness of digits [de-identified] : denies he stopped following with psychiatry  [As Noted in HPI] : as noted in HPI [Fatigue] : no fatigue [Recent Weight Gain (___ Lbs)] : no recent weight gain [Recent Weight Loss (___ Lbs)] : no recent weight loss [Visual Field Defect] : no visual field defect [Blurred Vision] : no blurred vision [Dysphagia] : no dysphagia [Neck Pain] : no neck pain [Dysphonia] : no dysphonia [Chest Pain] : no chest pain [Palpitations] : no palpitations [Lower Ext Edema] : no lower extremity edema [Shortness Of Breath] : no shortness of breath [SOB on Exertion] : no shortness of breath on exertion [Nausea] : no nausea [Constipation] : no constipation [Vomiting] : no vomiting [Diarrhea] : no diarrhea [Headaches] : no headaches [Dizziness] : no dizziness [Tremors] : no tremors [Depression] : no depression [Suicidal Ideation] : no suicidal ideation [Cold Intolerance] : no cold intolerance [Heat Intolerance] : no heat intolerance

## 2024-10-16 NOTE — DATA REVIEWED
[FreeTextEntry1] : reviewed 5/2022 labs in [facility]  2/2023: A1c 8.6%   crea 0.7    labs in [facility]  2/2024

## 2024-10-16 NOTE — PHYSICAL EXAM
[Alert] : alert [No Acute Distress] : no acute distress [No Proptosis] : no proptosis [No Lid Lag] : no lid lag [Thyroid Not Enlarged] : the thyroid was not enlarged [No Thyroid Nodules] : no palpable thyroid nodules [No Respiratory Distress] : no respiratory distress [No Accessory Muscle Use] : no accessory muscle use [Clear to Auscultation] : lungs were clear to auscultation bilaterally [No Murmurs] : no murmurs [Regular Rhythm] : with a regular rhythm [No Edema] : no peripheral edema [No Stigmata of Cushings Syndrome] : no stigmata of Cushings Syndrome [No Tremors] : no tremors [Oriented x3] : oriented to person, place, and time [Abdominal Striae] : no abdominal striae [Acanthosis Nigricans] : no acanthosis nigricans

## 2024-10-17 DIAGNOSIS — E10.65 TYPE 1 DIABETES MELLITUS WITH HYPERGLYCEMIA: ICD-10-CM

## 2024-10-18 DIAGNOSIS — E10.65 TYPE 1 DIABETES MELLITUS WITH HYPERGLYCEMIA: ICD-10-CM

## 2024-10-18 DIAGNOSIS — E16.2 HYPOGLYCEMIA, UNSPECIFIED: ICD-10-CM

## 2024-10-23 LAB
ALBUMIN SERPL ELPH-MCNC: 4.6 G/DL
ALP BLD-CCNC: 111 U/L
ALT SERPL-CCNC: 44 U/L
ANION GAP SERPL CALC-SCNC: 16 MMOL/L
AST SERPL-CCNC: 32 U/L
BILIRUB SERPL-MCNC: 0.4 MG/DL
BUN SERPL-MCNC: 19 MG/DL
CALCIUM SERPL-MCNC: 9.7 MG/DL
CHLORIDE SERPL-SCNC: 97 MMOL/L
CHOLEST SERPL-MCNC: 182 MG/DL
CO2 SERPL-SCNC: 23 MMOL/L
CREAT SERPL-MCNC: 0.8 MG/DL
CREAT SPEC-SCNC: 74 MG/DL
EGFR: 124 ML/MIN/1.73M2
GLUCOSE SERPL-MCNC: 363 MG/DL
HDLC SERPL-MCNC: 56 MG/DL
LDLC SERPL CALC-MCNC: 102 MG/DL
MICROALBUMIN 24H UR DL<=1MG/L-MCNC: <1.2 MG/DL
MICROALBUMIN/CREAT 24H UR-RTO: NORMAL MG/G
NONHDLC SERPL-MCNC: 126 MG/DL
POTASSIUM SERPL-SCNC: 4.9 MMOL/L
PROT SERPL-MCNC: 7 G/DL
SODIUM SERPL-SCNC: 136 MMOL/L
TRIGL SERPL-MCNC: 122 MG/DL
VIT B12 SERPL-MCNC: 687 PG/ML

## 2024-12-20 ENCOUNTER — NON-APPOINTMENT (OUTPATIENT)
Age: 29
End: 2024-12-20

## 2024-12-24 PROBLEM — F10.90 ALCOHOL USE: Status: ACTIVE | Noted: 2022-11-09

## 2025-01-22 ENCOUNTER — APPOINTMENT (OUTPATIENT)
Dept: ENDOCRINOLOGY | Facility: CLINIC | Age: 30
End: 2025-01-22

## 2025-03-05 ENCOUNTER — OUTPATIENT (OUTPATIENT)
Dept: OUTPATIENT SERVICES | Facility: HOSPITAL | Age: 30
LOS: 1 days | End: 2025-03-05
Payer: MEDICAID

## 2025-03-05 ENCOUNTER — APPOINTMENT (OUTPATIENT)
Dept: ENDOCRINOLOGY | Facility: CLINIC | Age: 30
End: 2025-03-05
Payer: MEDICAID

## 2025-03-05 VITALS
SYSTOLIC BLOOD PRESSURE: 111 MMHG | WEIGHT: 145 LBS | DIASTOLIC BLOOD PRESSURE: 75 MMHG | HEIGHT: 69 IN | HEART RATE: 114 BPM | BODY MASS INDEX: 21.48 KG/M2

## 2025-03-05 DIAGNOSIS — M25.561 PAIN IN RIGHT KNEE: ICD-10-CM

## 2025-03-05 DIAGNOSIS — E10.65 TYPE 1 DIABETES MELLITUS WITH HYPERGLYCEMIA: ICD-10-CM

## 2025-03-05 DIAGNOSIS — Z00.00 ENCOUNTER FOR GENERAL ADULT MEDICAL EXAMINATION WITHOUT ABNORMAL FINDINGS: ICD-10-CM

## 2025-03-05 PROCEDURE — 99214 OFFICE O/P EST MOD 30 MIN: CPT

## 2025-03-05 RX ORDER — CALCIUM CARB/VITAMIN D3/VIT K1 500-100-40
31G X 5/16" TABLET,CHEWABLE ORAL
Qty: 1 | Refills: 3 | Status: ACTIVE | COMMUNITY
Start: 2025-03-05 | End: 1900-01-01

## 2025-03-06 NOTE — HISTORY OF PRESENT ILLNESS
[Finger Stick] : Finger Stick: Yes [FreeTextEntry1] : Mr. ISIS JIMÉNEZ  Is  a 29 year old male kth DM1, hx of suicide attempt by insulin overdose, who presents for follow up  type 1 DM.    Diagnosis: at the age of 9  Current Regimen:  Tresiba 15 units BID  , Admelog  ISF : 1:20 ,, I: C 1: 9g (average  10-20 / meal ) Previous regimens: had Omnipod insulin pump and DEXCOM but stopped because of insurance (for 2 years now )  Compliance: good  SMBCGM :  checking 4-5 times/ day, Dexcom G7 , on [facility]arity data reviewed GMI 8.8% post meals spikes and hypoglycemia noted  Hypoglycemia:  yes  Polyuria/polydipsia : no  Weight change/BMI: same  Diet: breakfast , lunch (biggest meal) , dinner  Exercise: active all day  HBa1c trend:  5/2022 Ac1c > 9 % in DKA ...8.6%( 2/2023) ... GMI 9.7% ...GMI 3/2025 8.8%   .prevention   Statin: was on lipitor then taken off as LDL was ok  ACE/ARB : no  Eye examination: not recently   Neuropathy: yes    10/2024: Mentions huge fluctuation in sugar between lows (40s) and high (400s). Connected to [facility] but reading only till august. Denies suicidal ideation or depression, not following with psych. Still didn't see podiatry nor ophthalmo. Didn't do blood work.

## 2025-03-06 NOTE — DATA REVIEWED
[FreeTextEntry1] : reviewed 5/2022 labs in [facility]  2/2023: A1c 8.6%   crea 0.7    labs in [facility]  2/2024  10/2024: A1c 9.4%TSH 1.96 FT4 1.2     crea 0.8 [id number]

## 2025-03-06 NOTE — REVIEW OF SYSTEMS
[Pain/Numbness of Digits] : pain/numbness of digits [Suicidal Ideation] : no suicidal ideation [Fatigue] : fatigue [Recent Weight Gain (___ Lbs)] : no recent weight gain [Recent Weight Loss (___ Lbs)] : no recent weight loss [Visual Field Defect] : no visual field defect [Blurred Vision] : no blurred vision [Dysphagia] : no dysphagia [Neck Pain] : no neck pain [Dysphonia] : no dysphonia [Chest Pain] : no chest pain [Palpitations] : no palpitations [Lower Ext Edema] : no lower extremity edema [Shortness Of Breath] : no shortness of breath [SOB on Exertion] : no shortness of breath on exertion [Nausea] : no nausea [Constipation] : no constipation [Vomiting] : no vomiting [Diarrhea] : no diarrhea [As Noted in HPI] : as noted in HPI [Headaches] : no headaches [Dizziness] : dizziness [Tremors] : no tremors [Depression] : no depression [Cold Intolerance] : no cold intolerance [Heat Intolerance] : no heat intolerance

## 2025-03-06 NOTE — ASSESSMENT
[Diabetes Foot Care] : diabetes foot care [Long Term Vascular Complications] : long term vascular complications of diabetes [Carbohydrate Consistent Diet] : carbohydrate consistent diet [Importance of Diet and Exercise] : importance of diet and exercise to improve glycemic control, achieve weight loss and improve cardiovascular health [Exercise/Effect on Glucose] : exercise/effect on glucose [Hypoglycemia Management] : hypoglycemia management [Self Monitoring of Blood Glucose] : self monitoring of blood glucose [Insulin Self-Administration] : insulin self-administration [Retinopathy Screening] : Patient was referred to ophthalmology for retinopathy screening [Weight Loss] : weight loss [FreeTextEntry1] : Mr. ISIS JIMÉNEZ Is a 29 year old male who presents for follow up   # poorly controlled type 1 DM , hypoglycemia  / Suicidal attempt 2/2024 by overdosing insulin  - Current Regimen: Tresiba 15 units BID  , Admelog ISF : 1:20 ,, I: C 1: 10 g ( average 10-20u/meal ) Previous regimens: had Omnipod insulin pump and DEXCOM but stopped because of insurance ( for 2 years now ) SMBG/CGM : checking 4-5 times/ day no real pattern very erratic between hypo and hyperglycemia using Dexcom  - continue tresiba 15 units in am and up to 16 units at bedtime  - change I: C ratio to 1: 8 g and continue correction at 1: 20 target 120  - need ophthalmology/ podiatry referral - BP ok, reports dizziness when getting up , check am cortisol  - did not follow up with psychiatry denies any depression now or suicidal ideation, advised to follow discussed calling if any help needed to schedule psychiatry follow up

## 2025-03-06 NOTE — DATA REVIEWED
[FreeTextEntry1] : reviewed 5/2022 labs in [facility]  2/2023: A1c 8.6%   crea 0.7    labs in [facility]  2/2024  10/2024: A1c 9.4%TSH 1.96 FT4 1.2     crea 0.8 b12 [id number]

## 2025-03-17 DIAGNOSIS — E16.2 HYPOGLYCEMIA, UNSPECIFIED: ICD-10-CM

## 2025-03-17 DIAGNOSIS — R42 DIZZINESS AND GIDDINESS: ICD-10-CM

## 2025-03-17 DIAGNOSIS — E10.65 TYPE 1 DIABETES MELLITUS WITH HYPERGLYCEMIA: ICD-10-CM

## 2025-04-19 NOTE — PHYSICAL EXAM
Male [Alert] : alert [No Acute Distress] : no acute distress [No Proptosis] : no proptosis [No Lid Lag] : no lid lag [Thyroid Not Enlarged] : the thyroid was not enlarged [No Thyroid Nodules] : no palpable thyroid nodules [No Respiratory Distress] : no respiratory distress [No Accessory Muscle Use] : no accessory muscle use [Clear to Auscultation] : lungs were clear to auscultation bilaterally [No Murmurs] : no murmurs [Regular Rhythm] : with a regular rhythm [No Edema] : no peripheral edema [No Stigmata of Cushings Syndrome] : no stigmata of Cushings Syndrome [No Tremors] : no tremors [Oriented x3] : oriented to person, place, and time [Abdominal Striae] : no abdominal striae [Acanthosis Nigricans] : no acanthosis nigricans

## 2025-07-09 ENCOUNTER — OUTPATIENT (OUTPATIENT)
Dept: OUTPATIENT SERVICES | Facility: HOSPITAL | Age: 30
LOS: 1 days | End: 2025-07-09
Payer: MEDICAID

## 2025-07-09 ENCOUNTER — APPOINTMENT (OUTPATIENT)
Dept: ENDOCRINOLOGY | Facility: CLINIC | Age: 30
End: 2025-07-09

## 2025-07-09 VITALS
SYSTOLIC BLOOD PRESSURE: 104 MMHG | HEART RATE: 112 BPM | BODY MASS INDEX: 21.41 KG/M2 | DIASTOLIC BLOOD PRESSURE: 69 MMHG | WEIGHT: 145 LBS

## 2025-07-09 DIAGNOSIS — Z00.00 ENCOUNTER FOR GENERAL ADULT MEDICAL EXAMINATION WITHOUT ABNORMAL FINDINGS: ICD-10-CM

## 2025-07-09 PROCEDURE — 99213 OFFICE O/P EST LOW 20 MIN: CPT

## 2025-07-11 NOTE — DATA REVIEWED
[FreeTextEntry1] : reviewed 5/2022 labs in [facility]  2/2023: A1c 8.6%   crea 0.7    labs in [facility]  2/2024  3/2025 reviewed 10/2024: A1c 9.4%TSH 1.96 FT4 1.2     crea 0.8 b12 [id number]

## 2025-07-11 NOTE — HISTORY OF PRESENT ILLNESS
[Finger Stick] : Finger Stick: Yes [FreeTextEntry1] : Mr. ISIS JIMÉNEZ  Is  a 29 year old male kth DM1, hx of suicide attempt by insulin overdose, who presents for follow up  type 1 DM.    Diagnosis: at the age of 9  Current Regimen:  Tresiba 12-16 AM units AM and 14-18 PM , Admelog  ISF : 1:20 ,, I: C 1: 9g or 1:8g (average  10-20 / meal ) Previous regimens: had Omnipod insulin pump and DEXCOM but stopped because of insurance (for 2 years now )  Compliance: good  SMBCGM :  checking 4-5 times/ day, Dexcom G7 , on [facility] data reviewed GMI 8.8% post meals spikes and hypoglycemia noted  Hypoglycemia:  yes sparsely lately Polyuria/polydipsia : no  Weight change/BMI: same  Diet: breakfast , lunch (biggest meal) , dinner  Exercise: active all day  HBa1c trend:  5/2022 Ac1c > 9 % in DKA ...8.6%( 2/2023) ... GMI 9.7% ...A1c 9.3% 3/2025  .prevention   Statin: was on lipitor then taken off as LDL was ok  ACE/ARB : no  Eye examination: not recently   Neuropathy: yes    Denies suicidal ideation or depression, not following with psych. Still didn't see podiatry nor ophthalmo.

## 2025-07-11 NOTE — REVIEW OF SYSTEMS
[Fatigue] : fatigue [As Noted in HPI] : as noted in HPI [Dizziness] : dizziness [Pain/Numbness of Digits] : pain/numbness of digits [Recent Weight Gain (___ Lbs)] : no recent weight gain [Recent Weight Loss (___ Lbs)] : no recent weight loss [Visual Field Defect] : no visual field defect [Blurred Vision] : no blurred vision [Dysphagia] : no dysphagia [Neck Pain] : no neck pain [Dysphonia] : no dysphonia [Chest Pain] : no chest pain [Palpitations] : no palpitations [Lower Ext Edema] : no lower extremity edema [Shortness Of Breath] : no shortness of breath [SOB on Exertion] : no shortness of breath on exertion [Nausea] : no nausea [Constipation] : no constipation [Vomiting] : no vomiting [Diarrhea] : no diarrhea [Headaches] : no headaches [Tremors] : no tremors [Depression] : no depression [Cold Intolerance] : no cold intolerance [Heat Intolerance] : no heat intolerance

## 2025-07-11 NOTE — DATA REVIEWED
[FreeTextEntry1] : reviewed 5/2022 labs in [facility]  2/2023: A1c 8.6%   crea 0.7    labs in [facility]  2/2024  3/2025 reviewed 10/2024: A1c 9.4%TSH 1.96 FT4 1.2     crea 0.8 b12 [id number]

## 2025-07-11 NOTE — ASSESSMENT
[Diabetes Foot Care] : diabetes foot care [Long Term Vascular Complications] : long term vascular complications of diabetes [Carbohydrate Consistent Diet] : carbohydrate consistent diet [Importance of Diet and Exercise] : importance of diet and exercise to improve glycemic control, achieve weight loss and improve cardiovascular health [Exercise/Effect on Glucose] : exercise/effect on glucose [Hypoglycemia Management] : hypoglycemia management [Self Monitoring of Blood Glucose] : self monitoring of blood glucose [Insulin Self-Administration] : insulin self-administration [Retinopathy Screening] : Patient was referred to ophthalmology for retinopathy screening [Weight Loss] : weight loss [FreeTextEntry1] : Mr. ISIS JIMÉNEZ Is a 29 year old male who presents for follow up   # poorly controlled type 1 DM , hypoglycemia  / Suicidal attempt 2/2024 by overdosing insulin  Current Regimen:  Tresiba 12-16 AM units AM and 14-18 PM , Admelog  ISF : 1:20 ,, I: C 1: 9g or 1:8g (average 20-30/ meal ) Previous regimens: had Omnipod insulin pump and DEXCOM but stopped because of insurance ( for 2 years now ) SMBG/CGM : checking 4-5 times/ day, sugars high mainly in AM and after meals - change tresiba to total 35 units daily split between AM and PM - change I: C ratio to 1: 7 g or 1:8 g and continue correction at 1: 50 target 120  - need ophthalmology/ podiatry appointments, encouraged to go - AM cortisol wnl - did not follow up with psychiatry denies any depression now or suicidal ideation, advised to follow discussed calling if any help needed to schedule psychiatry follow up

## 2025-07-18 DIAGNOSIS — E10.9 TYPE 1 DIABETES MELLITUS WITHOUT COMPLICATIONS: ICD-10-CM

## 2025-07-18 DIAGNOSIS — E16.2 HYPOGLYCEMIA, UNSPECIFIED: ICD-10-CM

## 2025-07-21 ENCOUNTER — NON-APPOINTMENT (OUTPATIENT)
Age: 30
End: 2025-07-21